# Patient Record
Sex: FEMALE | Race: WHITE | Employment: OTHER | URBAN - METROPOLITAN AREA
[De-identification: names, ages, dates, MRNs, and addresses within clinical notes are randomized per-mention and may not be internally consistent; named-entity substitution may affect disease eponyms.]

---

## 2017-02-20 RX ORDER — ALENDRONATE SODIUM 70 MG/1
TABLET ORAL
Qty: 12 TABLET | Refills: 0 | Status: SHIPPED | OUTPATIENT
Start: 2017-02-20 | End: 2017-06-26 | Stop reason: SDUPTHER

## 2017-06-01 ENCOUNTER — APPOINTMENT (OUTPATIENT)
Dept: GENERAL RADIOLOGY | Age: 67
End: 2017-06-01
Payer: MEDICARE

## 2017-06-01 ENCOUNTER — HOSPITAL ENCOUNTER (EMERGENCY)
Age: 67
Discharge: HOME | End: 2017-06-01
Attending: EMERGENCY MEDICINE
Payer: MEDICARE

## 2017-06-01 VITALS
HEART RATE: 80 BPM | RESPIRATION RATE: 16 BRPM | DIASTOLIC BLOOD PRESSURE: 87 MMHG | OXYGEN SATURATION: 99 % | SYSTOLIC BLOOD PRESSURE: 150 MMHG | WEIGHT: 192 LBS | HEIGHT: 65 IN | BODY MASS INDEX: 31.99 KG/M2 | TEMPERATURE: 98.2 F

## 2017-06-01 DIAGNOSIS — S70.02XA CONTUSION OF LEFT HIP, INITIAL ENCOUNTER: ICD-10-CM
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
## 2017-06-01 DIAGNOSIS — S40.012A CONTUSION OF LEFT SHOULDER, INITIAL ENCOUNTER: Primary | ICD-10-CM

## 2017-06-01 PROCEDURE — 73552 X-RAY EXAM OF FEMUR 2/>: CPT

## 2017-06-01 PROCEDURE — 99284 EMERGENCY DEPT VISIT MOD MDM: CPT

## 2017-06-01 PROCEDURE — 73030 X-RAY EXAM OF SHOULDER: CPT

## 2017-06-01 ASSESSMENT — ENCOUNTER SYMPTOMS
BACK PAIN: 0
WHEEZING: 0
VOMITING: 0
COLOR CHANGE: 0
SHORTNESS OF BREATH: 0
BLOOD IN STOOL: 0
EYE DISCHARGE: 0
FACIAL SWELLING: 0
COUGH: 0
SINUS PRESSURE: 0
DIARRHEA: 0
CONSTIPATION: 0
CHEST TIGHTNESS: 0
TROUBLE SWALLOWING: 0
NAUSEA: 0
EYE PAIN: 0
ABDOMINAL PAIN: 0
SORE THROAT: 0
RHINORRHEA: 0
EYE REDNESS: 0

## 2017-06-01 ASSESSMENT — PAIN SCALES - GENERAL: PAINLEVEL_OUTOF10: 5

## 2017-06-26 RX ORDER — ALENDRONATE SODIUM 70 MG/1
TABLET ORAL
Qty: 12 TABLET | Refills: 0 | Status: SHIPPED | OUTPATIENT
Start: 2017-06-26 | End: 2017-12-18 | Stop reason: SDUPTHER

## 2017-12-18 RX ORDER — ALENDRONATE SODIUM 70 MG/1
TABLET ORAL
Qty: 12 TABLET | Refills: 0 | Status: SHIPPED | OUTPATIENT
Start: 2017-12-18 | End: 2018-03-26 | Stop reason: SDUPTHER

## 2018-04-10 ENCOUNTER — HOSPITAL ENCOUNTER (OUTPATIENT)
Dept: WOMENS IMAGING | Age: 68
Discharge: HOME | End: 2018-04-12
Payer: MEDICARE

## 2018-04-10 DIAGNOSIS — Z78.0 POST-MENOPAUSAL: ICD-10-CM

## 2018-04-10 DIAGNOSIS — Z12.39 BREAST CANCER SCREENING: ICD-10-CM

## 2018-04-10 DIAGNOSIS — M81.0 AGE-RELATED OSTEOPOROSIS WITHOUT CURRENT PATHOLOGICAL FRACTURE: ICD-10-CM

## 2018-04-10 PROCEDURE — 77067 SCR MAMMO BI INCL CAD: CPT

## 2018-04-10 PROCEDURE — 77080 DXA BONE DENSITY AXIAL: CPT

## 2018-04-13 PROBLEM — E78.00 PURE HYPERCHOLESTEROLEMIA: Status: ACTIVE | Noted: 2018-04-13

## 2018-04-25 ENCOUNTER — CARE COORDINATION (OUTPATIENT)
Dept: CARE COORDINATION | Age: 68
End: 2018-04-25

## 2018-05-10 ENCOUNTER — CARE COORDINATION (OUTPATIENT)
Dept: CARE COORDINATION | Age: 68
End: 2018-05-10

## 2018-05-14 ENCOUNTER — CARE COORDINATION (OUTPATIENT)
Dept: CARE COORDINATION | Age: 68
End: 2018-05-14

## 2018-05-15 ENCOUNTER — CARE COORDINATION (OUTPATIENT)
Dept: CARE COORDINATION | Age: 68
End: 2018-05-15

## 2018-05-23 ENCOUNTER — CARE COORDINATION (OUTPATIENT)
Dept: CARE COORDINATION | Age: 68
End: 2018-05-23

## 2018-05-24 ENCOUNTER — CARE COORDINATION (OUTPATIENT)
Dept: CARE COORDINATION | Age: 68
End: 2018-05-24

## 2018-06-06 ENCOUNTER — CARE COORDINATION (OUTPATIENT)
Dept: CARE COORDINATION | Age: 68
End: 2018-06-06

## 2018-06-27 ENCOUNTER — CARE COORDINATION (OUTPATIENT)
Dept: CARE COORDINATION | Age: 68
End: 2018-06-27

## 2018-07-06 ENCOUNTER — CARE COORDINATION (OUTPATIENT)
Dept: CARE COORDINATION | Age: 68
End: 2018-07-06

## 2018-07-10 ENCOUNTER — CARE COORDINATION (OUTPATIENT)
Dept: CARE COORDINATION | Age: 68
End: 2018-07-10

## 2018-07-25 ENCOUNTER — CARE COORDINATION (OUTPATIENT)
Dept: CARE COORDINATION | Age: 68
End: 2018-07-25

## 2018-07-25 NOTE — CARE COORDINATION
Nutrition Care Coordinator Follow-Up visit:    Food Recall: eating 3 meals/day    Activity Level:  Sedentary:  Lightly Active: X  Moderately Active:  Very Active:    Adult BMI:  Underweight (below 18.5)  Normal Weight (18.5-24.9)  Overweight (25-29. 9)  Obese (30-39. 9) X  Morbidly Obese (>40)    Weight Change: down 5 pounds over the past 1-2 months      Plan:  Plan was established with patient:  Increase dietary fiber by consuming whole grains, fruits and vegetables:  Limit dietary cholesterol to >200mg/day: Increase water intake:  Avoid added sugar:  Avoid sweetened beverages:  Choose lean meats:  Limit sodium intake to <2gm/day: X    Monitoring: Will monitor weight: X  Will monitor adherence to meal plan: Will monitor adherence to exercise plan: Will monitor HGA1c:    Handouts Provided :  Low Carb snacking:  Carb counting /individual meal plan:  Portion Control:  Food Labels:  Physical Activity:  Low Fat/Cholesterol:  Hypo/Hyperglycemia:  Calorie Controlled Meal Plan:  Low Sodium: X  Low phosphorus foods: X    Goals: Increase water consumption to 8oz. 6-8 times daily:  Manage blood sugars by consuming 3 meals spaced every 4-5 hours with 2-3 snacks daily:  Increase fiber and decrease fat intake by consuming 1-2 fruit servings and 2-3 vegetable servings per day. Increase physical activity by: Encouraged to increase walking as tolerated  Consume less than 2,000mg of sodium/day-Discussed/reviewed  Avoid consumption of sweetened beverages and added sugar by reading food labels:  Monitor blood sugars by using meter to check blood glucose before morning meal and 2 hours after a meal daily:  Decrease risk of coronary heart disease by consuming fish that contains omega-3 fatty acids at least twice a week, avoiding partially hydrogenated oil/trans fats and limiting saturated fat intake by reading food labels:    Patient goals set:  1. Patient continues to work on reducing her sodium intake, goal is <2,000mg/day. She relays not adding salt and avoiding processed foods. We also reviewed low phosphorus guidelines. 2. Encouraged to continue to work on limiting sugary drinks, down to 1 can of Sprite/week. Encouraged her to increase water intake. Reviewed plate method- encouraged 1/2 of her late to be fruit and non-starchy vegetables at all meals, she relays she continues to improve on this. Patient encouraged by 5 pound weight loss, encouraged to continue to work on. Will follow up in 2-3 weeks. Patient had questions regarding test results referred her back to provider to discuss.         Francisco George

## 2018-08-15 ENCOUNTER — CARE COORDINATION (OUTPATIENT)
Dept: CARE COORDINATION | Age: 68
End: 2018-08-15

## 2018-08-17 ENCOUNTER — CARE COORDINATION (OUTPATIENT)
Dept: CARE COORDINATION | Age: 68
End: 2018-08-17

## 2018-08-17 NOTE — CARE COORDINATION
reduced her sodium intake, goal is <2,000mg/day. She relays not adding salt and avoiding processed foods. We also reviewed low phosphorus guidelines. 2. Patient has cut out sugary drinks, down to <1 can of Sprite/week.  Encouraged her to increase water intake. Reviewed plate method- encouraged 1/2 of her plate to be fruit and non-starchy vegetables at all meals, she relays she continues to improve on this. Patient had no further questions at this time, awaiting thyroid surgery next month. Will f/u after surgery.           Donald Donohue

## 2018-08-17 NOTE — CARE COORDINATION
Ambulatory Care Coordination Note  8/17/2018  CM Risk Score: 1  Whit Mortality Risk Score: 2.78    ACC: Racheal Valderrama    Summary Note: Tita reports she is doing \"good\" today. She is scheduled for a LL parathyroidectomy on 9.4. 18. She states she has not received \"the packet\" in the mail yet from the office with the information she will need to prepare for the surgery. She was told this \"surgery should get her calcium level back to normal\". She was grateful for the assistance provided to her from [address] [place] [address]. Plan:  Follow Tita in the back ground until her surgery is complete. CC will make herself available to assist if necessary. Care Coordination Interventions    Program Enrollment:  Rising Risk  Referral from Primary Care Provider:  No  Suggested Interventions and Community Resources  Registered Dietician:  Completed         Goals Addressed     None          Prior to Admission medications    Medication Sig Start Date End Date Taking? Authorizing Provider   omeprazole (PRILOSEC) 20 MG delayed release capsule Take 20 mg by mouth 2 times daily    Historical Provider, MD   acetaminophen (TYLENOL) 325 MG tablet Take 650 mg by mouth 2 times daily    Historical Provider, MD   DiphenhydrAMINE HCl (BENADRYL ALLERGY PO) Take 1 capsule by mouth as needed    Historical Provider, MD   atorvastatin (LIPITOR) 10 MG tablet Take 1 tablet by mouth daily 4/13/18   TOI Richard CNP   Handicap Placard [facility] It is my medical opinin that Juli Johnson requires a disability parking placard for the following reasons:  She has limited walking ability due to an arthritic condition.   Duration of need: permanent 3/26/18   TOI Richard CNP   Probiotic Product (PROBIOTIC DAILY PO) Take 1 capsule by mouth daily     Historical Provider, MD       Future Appointments  Date Time Provider Monisha Martinez   8/28/2018 3:00 PM [facility] RM 2 [facility]   10/10/2018 11:15 AM [facility] Gomez [facility]   10/15/2018 1:00 PM Kristen Hernandez, 1 [facility]

## 2018-08-28 ENCOUNTER — HOSPITAL ENCOUNTER (OUTPATIENT)
Dept: PREADMISSION TESTING | Age: 68
Discharge: HOME | End: 2018-09-01
Payer: MEDICARE

## 2018-08-28 VITALS
BODY MASS INDEX: 35.16 KG/M2 | SYSTOLIC BLOOD PRESSURE: 149 MMHG | DIASTOLIC BLOOD PRESSURE: 82 MMHG | TEMPERATURE: 98.1 F | HEART RATE: 56 BPM | OXYGEN SATURATION: 97 % | WEIGHT: 211 LBS | HEIGHT: 65 IN | RESPIRATION RATE: 16 BRPM

## 2018-08-28 LAB
ANION GAP SERPL CALCULATED.3IONS-SCNC: 10 MMOL/L (ref 9–17)
BUN BLDV-MCNC: 15 MG/DL (ref 8–23)
CHLORIDE BLD-SCNC: 106 MMOL/L (ref 98–107)
CO2: 24 MMOL/L (ref 20–31)
CREAT SERPL-MCNC: 1.05 MG/DL (ref 0.5–0.9)
EKG ATRIAL RATE: 51 BPM
EKG P AXIS: 6 DEGREES
EKG P-R INTERVAL: 188 MS
EKG Q-T INTERVAL: 442 MS
EKG QRS DURATION: 110 MS
EKG QTC CALCULATION (BAZETT): 407 MS
EKG R AXIS: -32 DEGREES
EKG T AXIS: 10 DEGREES
EKG VENTRICULAR RATE: 51 BPM
GFR AFRICAN AMERICAN: >60 ML/MIN
GFR NON-AFRICAN AMERICAN: 52 ML/MIN
GFR SERPL CREATININE-BSD FRML MDRD: ABNORMAL ML/MIN/{1.73_M2}
GFR SERPL CREATININE-BSD FRML MDRD: ABNORMAL ML/MIN/{1.73_M2}
HCT VFR BLD CALC: 41.4 % (ref 36.3–47.1)
HEMOGLOBIN: 13 G/DL (ref 11.9–15.1)
POTASSIUM SERPL-SCNC: 4.6 MMOL/L (ref 3.7–5.3)
SODIUM BLD-SCNC: 140 MMOL/L (ref 135–144)

## 2018-08-28 PROCEDURE — 84520 ASSAY OF UREA NITROGEN: CPT

## 2018-08-28 PROCEDURE — 36415 COLL VENOUS BLD VENIPUNCTURE: CPT

## 2018-08-28 PROCEDURE — 93005 ELECTROCARDIOGRAM TRACING: CPT

## 2018-08-28 PROCEDURE — 80051 ELECTROLYTE PANEL: CPT

## 2018-08-28 PROCEDURE — 82565 ASSAY OF CREATININE: CPT

## 2018-08-28 PROCEDURE — 85018 HEMOGLOBIN: CPT

## 2018-08-28 PROCEDURE — 85014 HEMATOCRIT: CPT

## 2018-08-28 RX ORDER — ACETAMINOPHEN 500 MG
1000 TABLET ORAL 2 TIMES DAILY PRN
COMMUNITY

## 2018-08-28 RX ORDER — SODIUM CHLORIDE, SODIUM LACTATE, POTASSIUM CHLORIDE, CALCIUM CHLORIDE 600; 310; 30; 20 MG/100ML; MG/100ML; MG/100ML; MG/100ML
1000 INJECTION, SOLUTION INTRAVENOUS CONTINUOUS
Status: CANCELLED | OUTPATIENT
Start: 2018-08-28

## 2018-08-28 ASSESSMENT — PAIN DESCRIPTION - FREQUENCY: FREQUENCY: CONTINUOUS

## 2018-08-28 ASSESSMENT — PAIN DESCRIPTION - ONSET: ONSET: ON-GOING

## 2018-08-28 ASSESSMENT — PAIN DESCRIPTION - PROGRESSION: CLINICAL_PROGRESSION: GRADUALLY WORSENING

## 2018-08-28 ASSESSMENT — PAIN DESCRIPTION - DESCRIPTORS: DESCRIPTORS: ACHING

## 2018-08-28 ASSESSMENT — PAIN SCALES - GENERAL: PAINLEVEL_OUTOF10: 5

## 2018-08-28 ASSESSMENT — PAIN DESCRIPTION - PAIN TYPE: TYPE: CHRONIC PAIN

## 2018-08-28 NOTE — ANESTHESIA PRE-OP
Anesthesia Focused Assessment    STOP-BANG Sleep Apnea Questionnaire    Obstructive Sleep Apnea:                                                                 No     Machine used:                                                                                  No            SNORE loudly (heard through closed doors)? No      TIRED, fatigued, sleepy during daytime? No      OBSERVED stopping breathing during sleep? No      High blood PRESSURE being treated? No      BMI over 35? Yes  AGE over 48? Yes  NECK circumference over 16\"? No  GENDER (male)? No                High risk 5-8  Intermediate risk 3-4  Low risk 0-2    Total 3  High risk 5-8  Intermediate risk 3-4  Low risk 0-2      Type 1 DM:                                                                                        No    TYPE 2:                                                                                             No    If yes, insulin dependent? No    Coronary Artery Disease :                                                                No        Active smoker                                                                                   No    Drinks Alcohol                                                                                   No    Dentition: Dentures                                                                            No              Partial                                                                                                 No     Any loose or missing teeth                                                                 Yes, missing     Defib / AICD / Pacemaker:                                                               No    Renal Failure: No    If Yes, On dialysis?       Hx of anesthesia complications with Patient                            Crying post op     Hx of anesthesia complications with family No    Medical or cardiac clearance ordered:                                         No    Anesthesiologist called:                                                                No    DANIEL P Christiana Dandy PA-C  Electronically signed 8/28/2018 at 10:31 AM

## 2018-08-28 NOTE — H&P
History and Physical    Pt Name: Giovanni Lopez  MRN: [id number]  YOB: 1950  Date of evaluation: 8/28/2018  Primary Care Physician: Terence Gannon MD  Patient evaluated at the request of  Dr. Alexis Hays    Reason for evaluation:   parathyroid adenoma   SUBJECTIVE:   History of Chief Complaint:      Luis Felipe García is a 76 y.o. female  Who was dx with   parathyroid adenoma  On a incidental finding of routine blood work in 2018 , denies kidney stones . Past Medical History      has a past medical history of CKD (chronic kidney disease) stage 3, GFR 30-59 ml/min; CKD (chronic kidney disease) stage 3, GFR 30-59 ml/min; Fibromyalgia; GERD (gastroesophageal reflux disease); Hiatal hernia; Hypercholesterolemia; OA (osteoarthritis); and Osteoarthritis. Past Surgical History   has a past surgical history that includes Tonsillectomy; Hysterectomy; Cholecystectomy; Appendectomy; Carpal tunnel release; cyst removal; Colonoscopy; Upper gastrointestinal endoscopy; and Upper gastrointestinal endoscopy. Medications   Scheduled Meds:  Current Outpatient Rx   Medication Sig Dispense Refill    acetaminophen (TYLENOL) 500 MG tablet Take 1,000 mg by mouth 2 times daily      omeprazole (PRILOSEC) 20 MG delayed release capsule Take 20 mg by mouth 2 times daily      DiphenhydrAMINE HCl (BENADRYL ALLERGY PO) Take 1 capsule by mouth as needed      atorvastatin (LIPITOR) 10 MG tablet Take 1 tablet by mouth daily (Patient taking differently: Take 10 mg by mouth nightly ) 90 tablet 1    Probiotic Product (PROBIOTIC DAILY PO) Take 1 capsule by mouth daily       Handicap Placard [facility]ISC It is my medical opinin that Mundo Puentes requires a disability parking placard for the following reasons:  She has limited walking ability due to an arthritic condition. Duration of need: permanent 1 each 0     Continuous Infusions:  PRN Meds:.   Allergies  is allergic to seasonal.    Family History    family history

## 2018-09-03 ENCOUNTER — ANESTHESIA EVENT (OUTPATIENT)
Dept: OPERATING ROOM | Age: 68
End: 2018-09-03
Payer: MEDICARE

## 2018-09-04 ENCOUNTER — ANESTHESIA (OUTPATIENT)
Dept: OPERATING ROOM | Age: 68
End: 2018-09-04
Payer: MEDICARE

## 2018-09-04 ENCOUNTER — HOSPITAL ENCOUNTER (OUTPATIENT)
Age: 68
Setting detail: OUTPATIENT SURGERY
Discharge: HOME | End: 2018-09-04
Attending: OTOLARYNGOLOGY | Admitting: OTOLARYNGOLOGY
Payer: MEDICARE

## 2018-09-04 VITALS
WEIGHT: 207 LBS | RESPIRATION RATE: 16 BRPM | SYSTOLIC BLOOD PRESSURE: 126 MMHG | HEART RATE: 78 BPM | DIASTOLIC BLOOD PRESSURE: 67 MMHG | BODY MASS INDEX: 34.49 KG/M2 | OXYGEN SATURATION: 96 % | TEMPERATURE: 96.8 F | HEIGHT: 65 IN

## 2018-09-04 VITALS — TEMPERATURE: 94.5 F | OXYGEN SATURATION: 98 % | DIASTOLIC BLOOD PRESSURE: 85 MMHG | SYSTOLIC BLOOD PRESSURE: 104 MMHG

## 2018-09-04 DIAGNOSIS — D35.1 PARATHYROID ADENOMA: Primary | ICD-10-CM

## 2018-09-04 LAB — PTH INTACT: 29.76 PG/ML (ref 15–65)

## 2018-09-04 PROCEDURE — 6370000000 HC RX 637 (ALT 250 FOR IP): Performed by: ANESTHESIOLOGY

## 2018-09-04 PROCEDURE — 6360000002 HC RX W HCPCS: Performed by: ANESTHESIOLOGY

## 2018-09-04 PROCEDURE — 2720000010 HC SURG SUPPLY STERILE: Performed by: OTOLARYNGOLOGY

## 2018-09-04 PROCEDURE — 3700000001 HC ADD 15 MINUTES (ANESTHESIA): Performed by: OTOLARYNGOLOGY

## 2018-09-04 PROCEDURE — 7100000041 HC SPAR PHASE II RECOVERY - ADDTL 15 MIN: Performed by: OTOLARYNGOLOGY

## 2018-09-04 PROCEDURE — 2500000003 HC RX 250 WO HCPCS: Performed by: OTOLARYNGOLOGY

## 2018-09-04 PROCEDURE — 88331 PATH CONSLTJ SURG 1 BLK 1SPC: CPT

## 2018-09-04 PROCEDURE — 2580000003 HC RX 258: Performed by: ANESTHESIOLOGY

## 2018-09-04 PROCEDURE — 2580000003 HC RX 258: Performed by: OTOLARYNGOLOGY

## 2018-09-04 PROCEDURE — 88305 TISSUE EXAM BY PATHOLOGIST: CPT

## 2018-09-04 PROCEDURE — 7100000001 HC PACU RECOVERY - ADDTL 15 MIN: Performed by: OTOLARYNGOLOGY

## 2018-09-04 PROCEDURE — 2500000003 HC RX 250 WO HCPCS: Performed by: NURSE ANESTHETIST, CERTIFIED REGISTERED

## 2018-09-04 PROCEDURE — 2709999900 HC NON-CHARGEABLE SUPPLY: Performed by: OTOLARYNGOLOGY

## 2018-09-04 PROCEDURE — 2780000010 HC IMPLANT OTHER: Performed by: OTOLARYNGOLOGY

## 2018-09-04 PROCEDURE — 3600000004 HC SURGERY LEVEL 4 BASE: Performed by: OTOLARYNGOLOGY

## 2018-09-04 PROCEDURE — 83970 ASSAY OF PARATHORMONE: CPT

## 2018-09-04 PROCEDURE — 3600000014 HC SURGERY LEVEL 4 ADDTL 15MIN: Performed by: OTOLARYNGOLOGY

## 2018-09-04 PROCEDURE — 6360000002 HC RX W HCPCS: Performed by: NURSE ANESTHETIST, CERTIFIED REGISTERED

## 2018-09-04 PROCEDURE — 7100000000 HC PACU RECOVERY - FIRST 15 MIN: Performed by: OTOLARYNGOLOGY

## 2018-09-04 PROCEDURE — 3700000000 HC ANESTHESIA ATTENDED CARE: Performed by: OTOLARYNGOLOGY

## 2018-09-04 PROCEDURE — 7100000040 HC SPAR PHASE II RECOVERY - FIRST 15 MIN: Performed by: OTOLARYNGOLOGY

## 2018-09-04 DEVICE — AGENT HEMOSTATIC SURGIFLOW MATRIX KIT W/THROMBIN: Type: IMPLANTABLE DEVICE | Status: FUNCTIONAL

## 2018-09-04 RX ORDER — DEXAMETHASONE SODIUM PHOSPHATE 4 MG/ML
INJECTION, SOLUTION INTRA-ARTICULAR; INTRALESIONAL; INTRAMUSCULAR; INTRAVENOUS; SOFT TISSUE PRN
Status: DISCONTINUED | OUTPATIENT
Start: 2018-09-04 | End: 2018-09-04 | Stop reason: SDUPTHER

## 2018-09-04 RX ORDER — SUCCINYLCHOLINE CHLORIDE 20 MG/ML
INJECTION INTRAMUSCULAR; INTRAVENOUS PRN
Status: DISCONTINUED | OUTPATIENT
Start: 2018-09-04 | End: 2018-09-04 | Stop reason: SDUPTHER

## 2018-09-04 RX ORDER — PROPOFOL 10 MG/ML
INJECTION, EMULSION INTRAVENOUS PRN
Status: DISCONTINUED | OUTPATIENT
Start: 2018-09-04 | End: 2018-09-04 | Stop reason: SDUPTHER

## 2018-09-04 RX ORDER — SCOLOPAMINE TRANSDERMAL SYSTEM 1 MG/1
1 PATCH, EXTENDED RELEASE TRANSDERMAL ONCE
Status: DISCONTINUED | OUTPATIENT
Start: 2018-09-04 | End: 2018-09-04 | Stop reason: HOSPADM

## 2018-09-04 RX ORDER — METHYLPREDNISOLONE 4 MG/1
TABLET ORAL
Qty: 1 KIT | Refills: 0 | Status: SHIPPED | OUTPATIENT
Start: 2018-09-05 | End: 2018-09-19 | Stop reason: ALTCHOICE

## 2018-09-04 RX ORDER — MAGNESIUM HYDROXIDE 1200 MG/15ML
LIQUID ORAL CONTINUOUS PRN
Status: DISCONTINUED | OUTPATIENT
Start: 2018-09-04 | End: 2018-09-04 | Stop reason: HOSPADM

## 2018-09-04 RX ORDER — LIDOCAINE HYDROCHLORIDE 10 MG/ML
INJECTION, SOLUTION EPIDURAL; INFILTRATION; INTRACAUDAL; PERINEURAL PRN
Status: DISCONTINUED | OUTPATIENT
Start: 2018-09-04 | End: 2018-09-04 | Stop reason: SDUPTHER

## 2018-09-04 RX ORDER — MIDAZOLAM HYDROCHLORIDE 1 MG/ML
INJECTION INTRAMUSCULAR; INTRAVENOUS PRN
Status: DISCONTINUED | OUTPATIENT
Start: 2018-09-04 | End: 2018-09-04

## 2018-09-04 RX ORDER — FENTANYL CITRATE 50 UG/ML
25 INJECTION, SOLUTION INTRAMUSCULAR; INTRAVENOUS EVERY 5 MIN PRN
Status: DISCONTINUED | OUTPATIENT
Start: 2018-09-04 | End: 2018-09-04 | Stop reason: HOSPADM

## 2018-09-04 RX ORDER — LIDOCAINE HYDROCHLORIDE AND EPINEPHRINE 10; 10 MG/ML; UG/ML
INJECTION, SOLUTION INFILTRATION; PERINEURAL PRN
Status: DISCONTINUED | OUTPATIENT
Start: 2018-09-04 | End: 2018-09-04 | Stop reason: HOSPADM

## 2018-09-04 RX ORDER — CEFAZOLIN SODIUM 1 G/50ML
INJECTION, SOLUTION INTRAVENOUS PRN
Status: DISCONTINUED | OUTPATIENT
Start: 2018-09-04 | End: 2018-09-04 | Stop reason: SDUPTHER

## 2018-09-04 RX ORDER — PROMETHAZINE HYDROCHLORIDE 25 MG/ML
6.25 INJECTION, SOLUTION INTRAMUSCULAR; INTRAVENOUS
Status: COMPLETED | OUTPATIENT
Start: 2018-09-04 | End: 2018-09-04

## 2018-09-04 RX ORDER — EPHEDRINE SULFATE 50 MG/ML
INJECTION, SOLUTION INTRAVENOUS PRN
Status: DISCONTINUED | OUTPATIENT
Start: 2018-09-04 | End: 2018-09-04 | Stop reason: SDUPTHER

## 2018-09-04 RX ORDER — GLYCOPYRROLATE 1 MG/5 ML
SYRINGE (ML) INTRAVENOUS PRN
Status: DISCONTINUED | OUTPATIENT
Start: 2018-09-04 | End: 2018-09-04 | Stop reason: SDUPTHER

## 2018-09-04 RX ORDER — FENTANYL CITRATE 50 UG/ML
INJECTION, SOLUTION INTRAMUSCULAR; INTRAVENOUS PRN
Status: DISCONTINUED | OUTPATIENT
Start: 2018-09-04 | End: 2018-09-04 | Stop reason: SDUPTHER

## 2018-09-04 RX ORDER — HYDROCODONE BITARTRATE AND ACETAMINOPHEN 5; 325 MG/1; MG/1
1 TABLET ORAL EVERY 4 HOURS PRN
Qty: 20 TABLET | Refills: 0 | Status: SHIPPED | OUTPATIENT
Start: 2018-09-04 | End: 2018-09-11

## 2018-09-04 RX ORDER — ONDANSETRON 2 MG/ML
INJECTION INTRAMUSCULAR; INTRAVENOUS PRN
Status: DISCONTINUED | OUTPATIENT
Start: 2018-09-04 | End: 2018-09-04 | Stop reason: SDUPTHER

## 2018-09-04 RX ORDER — MORPHINE SULFATE 4 MG/ML
INJECTION, SOLUTION INTRAMUSCULAR; INTRAVENOUS PRN
Status: DISCONTINUED | OUTPATIENT
Start: 2018-09-04 | End: 2018-09-04 | Stop reason: SDUPTHER

## 2018-09-04 RX ORDER — FENTANYL CITRATE 50 UG/ML
50 INJECTION, SOLUTION INTRAMUSCULAR; INTRAVENOUS EVERY 5 MIN PRN
Status: DISCONTINUED | OUTPATIENT
Start: 2018-09-04 | End: 2018-09-04 | Stop reason: HOSPADM

## 2018-09-04 RX ORDER — SODIUM CHLORIDE, SODIUM LACTATE, POTASSIUM CHLORIDE, CALCIUM CHLORIDE 600; 310; 30; 20 MG/100ML; MG/100ML; MG/100ML; MG/100ML
1000 INJECTION, SOLUTION INTRAVENOUS CONTINUOUS
Status: DISCONTINUED | OUTPATIENT
Start: 2018-09-04 | End: 2018-09-04 | Stop reason: HOSPADM

## 2018-09-04 RX ADMIN — PROPOFOL 200 MG: 10 INJECTION, EMULSION INTRAVENOUS at 09:32

## 2018-09-04 RX ADMIN — Medication 0.2 MG: at 10:22

## 2018-09-04 RX ADMIN — MORPHINE SULFATE 1 MG: 4 INJECTION INTRAVENOUS at 10:53

## 2018-09-04 RX ADMIN — PHENYLEPHRINE HYDROCHLORIDE 100 MCG: 10 INJECTION INTRAVENOUS at 09:52

## 2018-09-04 RX ADMIN — MORPHINE SULFATE 4 MG: 4 INJECTION INTRAVENOUS at 09:44

## 2018-09-04 RX ADMIN — Medication 0.2 MG: at 09:50

## 2018-09-04 RX ADMIN — ONDANSETRON 4 MG: 2 INJECTION, SOLUTION INTRAMUSCULAR; INTRAVENOUS at 10:17

## 2018-09-04 RX ADMIN — EPHEDRINE SULFATE 10 MG: 50 INJECTION, SOLUTION INTRAMUSCULAR; INTRAVENOUS; SUBCUTANEOUS at 09:52

## 2018-09-04 RX ADMIN — LIDOCAINE HYDROCHLORIDE 50 MG: 10 INJECTION, SOLUTION EPIDURAL; INFILTRATION; INTRACAUDAL; PERINEURAL at 09:32

## 2018-09-04 RX ADMIN — PHENYLEPHRINE HYDROCHLORIDE 200 MCG: 10 INJECTION INTRAVENOUS at 10:29

## 2018-09-04 RX ADMIN — PROMETHAZINE HYDROCHLORIDE 6.25 MG: 25 INJECTION, SOLUTION INTRAMUSCULAR; INTRAVENOUS at 11:37

## 2018-09-04 RX ADMIN — Medication 140 MG: at 09:32

## 2018-09-04 RX ADMIN — EPHEDRINE SULFATE 5 MG: 50 INJECTION, SOLUTION INTRAMUSCULAR; INTRAVENOUS; SUBCUTANEOUS at 10:22

## 2018-09-04 RX ADMIN — MORPHINE SULFATE 1 MG: 4 INJECTION INTRAVENOUS at 10:54

## 2018-09-04 RX ADMIN — DEXAMETHASONE SODIUM PHOSPHATE 10 MG: 4 INJECTION, SOLUTION INTRAMUSCULAR; INTRAVENOUS at 09:37

## 2018-09-04 RX ADMIN — PHENYLEPHRINE HYDROCHLORIDE 100 MCG: 10 INJECTION INTRAVENOUS at 10:06

## 2018-09-04 RX ADMIN — SODIUM CHLORIDE, POTASSIUM CHLORIDE, SODIUM LACTATE AND CALCIUM CHLORIDE 1000 ML: 600; 310; 30; 20 INJECTION, SOLUTION INTRAVENOUS at 07:39

## 2018-09-04 RX ADMIN — PHENYLEPHRINE HYDROCHLORIDE 100 MCG: 10 INJECTION INTRAVENOUS at 10:08

## 2018-09-04 RX ADMIN — CEFAZOLIN SODIUM 2 G: 1 INJECTION, SOLUTION INTRAVENOUS at 09:35

## 2018-09-04 RX ADMIN — MORPHINE SULFATE 1 MG: 4 INJECTION INTRAVENOUS at 10:48

## 2018-09-04 RX ADMIN — FENTANYL CITRATE 100 MCG: 50 INJECTION INTRAMUSCULAR; INTRAVENOUS at 09:32

## 2018-09-04 RX ADMIN — EPHEDRINE SULFATE 10 MG: 50 INJECTION, SOLUTION INTRAMUSCULAR; INTRAVENOUS; SUBCUTANEOUS at 09:51

## 2018-09-04 RX ADMIN — SODIUM CHLORIDE, POTASSIUM CHLORIDE, SODIUM LACTATE AND CALCIUM CHLORIDE: 600; 310; 30; 20 INJECTION, SOLUTION INTRAVENOUS at 10:15

## 2018-09-04 ASSESSMENT — PULMONARY FUNCTION TESTS
PIF_VALUE: 24
PIF_VALUE: 22
PIF_VALUE: 22
PIF_VALUE: 19
PIF_VALUE: 0
PIF_VALUE: 19
PIF_VALUE: 24
PIF_VALUE: 23
PIF_VALUE: 20
PIF_VALUE: 20
PIF_VALUE: 4
PIF_VALUE: 22
PIF_VALUE: 23
PIF_VALUE: 3
PIF_VALUE: 25
PIF_VALUE: 22
PIF_VALUE: 21
PIF_VALUE: 3
PIF_VALUE: 21
PIF_VALUE: 15
PIF_VALUE: 1
PIF_VALUE: 21
PIF_VALUE: 21
PIF_VALUE: 20
PIF_VALUE: 21
PIF_VALUE: 22
PIF_VALUE: 24
PIF_VALUE: 0
PIF_VALUE: 27
PIF_VALUE: 20
PIF_VALUE: 21
PIF_VALUE: 1
PIF_VALUE: 0
PIF_VALUE: 22
PIF_VALUE: 23
PIF_VALUE: 2
PIF_VALUE: 2
PIF_VALUE: 1
PIF_VALUE: 23
PIF_VALUE: 22
PIF_VALUE: 21
PIF_VALUE: 22
PIF_VALUE: 0
PIF_VALUE: 18
PIF_VALUE: 0
PIF_VALUE: 21
PIF_VALUE: 1
PIF_VALUE: 19
PIF_VALUE: 23
PIF_VALUE: 23
PIF_VALUE: 24
PIF_VALUE: 23
PIF_VALUE: 20
PIF_VALUE: 18
PIF_VALUE: 22
PIF_VALUE: 25
PIF_VALUE: 11
PIF_VALUE: 21
PIF_VALUE: 19
PIF_VALUE: 23
PIF_VALUE: 7
PIF_VALUE: 24
PIF_VALUE: 20
PIF_VALUE: 1
PIF_VALUE: 21
PIF_VALUE: 20
PIF_VALUE: 26
PIF_VALUE: 25
PIF_VALUE: 1
PIF_VALUE: 24
PIF_VALUE: 22
PIF_VALUE: 22
PIF_VALUE: 7
PIF_VALUE: 22
PIF_VALUE: 19
PIF_VALUE: 24
PIF_VALUE: 23
PIF_VALUE: 22
PIF_VALUE: 21
PIF_VALUE: 22
PIF_VALUE: 28
PIF_VALUE: 22
PIF_VALUE: 21
PIF_VALUE: 23
PIF_VALUE: 19
PIF_VALUE: 2
PIF_VALUE: 20
PIF_VALUE: 19
PIF_VALUE: 21
PIF_VALUE: 23
PIF_VALUE: 19
PIF_VALUE: 24
PIF_VALUE: 23

## 2018-09-04 ASSESSMENT — PAIN SCALES - GENERAL
PAINLEVEL_OUTOF10: 0

## 2018-09-04 ASSESSMENT — PAIN - FUNCTIONAL ASSESSMENT: PAIN_FUNCTIONAL_ASSESSMENT: 0-10

## 2018-09-04 ASSESSMENT — PAIN DESCRIPTION - PAIN TYPE: TYPE: SURGICAL PAIN

## 2018-09-04 NOTE — H&P (VIEW-ONLY)
(gastroesophageal reflux disease)    Health care maintenance    OA (osteoarthritis)    Fibromyalgia    Pure hypercholesterolemia    Osteoarthritis    Hypercholesterolemia    Hiatal hernia    CKD (chronic kidney disease) stage 3, GFR 30-59 ml/min               IMPRESSIONS:   1.    parathyroid adenoma   2.  does not have any pertinent problems on file.     Aashish [facility]  Electronically signed 8/28/2018 at 10:31 AM       Scheduled for:  Left lower parathyroidectomy , NIM monitor

## 2018-09-04 NOTE — ANESTHESIA PRE PROCEDURE
Encounters:   09/04/18 (!) 155/72   08/28/18 (!) 149/82   07/05/18 122/64       NPO Status: Time of last liquid consumption: 2200                        Time of last solid consumption: 1700                        Date of last liquid consumption: 09/03/18                        Date of last solid food consumption: 09/03/18    BMI:   Wt Readings from Last 3 Encounters:   09/04/18 207 lb (93.9 kg)   08/28/18 211 lb (95.7 kg)   07/05/18 214 lb (97.1 kg)     Body mass index is 34.45 kg/m². CBC:   Lab Results   Component Value Date    WBC 5.1 06/27/2018    RBC 4.56 06/27/2018    HGB 13.0 08/28/2018    HCT 41.4 08/28/2018    MCV 90 05/16/2018    RDW 14.0 05/16/2018     06/27/2018       CMP:   Lab Results   Component Value Date     08/28/2018    K 4.6 08/28/2018     08/28/2018    CO2 24 08/28/2018    BUN 15 08/28/2018    CREATININE 1.05 08/28/2018    GFRAA >60 08/28/2018    LABGLOM 52 08/28/2018    GLUCOSE 106 04/06/2018    PROT 7.1 05/04/2015    CALCIUM 11.8 06/27/2018    BILITOT 0.6 04/06/2018    ALKPHOS 62 04/06/2018    AST 13 04/06/2018    ALT 12 04/06/2018       POC Tests: No results for input(s): POCGLU, POCNA, POCK, POCCL, POCBUN, POCHEMO, POCHCT in the last 72 hours. Coags: No results found for: PROTIME, INR, APTT    HCG (If Applicable): No results found for: PREGTESTUR, PREGSERUM, HCG, HCGQUANT     ABGs: No results found for: PHART, PO2ART, UWC7DQX, DND9GIT, BEART, I8CCWOJE     Type & Screen (If Applicable):  No results found for: LABABO, [address]    Anesthesia Evaluation  Patient summary reviewed no history of anesthetic complications:   Airway: Mallampati: II  TM distance: >3 FB   Neck ROM: full  Mouth opening: > = 3 FB Dental: normal exam         Pulmonary:             Patient did not smoke on day of surgery.                  Cardiovascular:Negative CV ROS    (+) hyperlipidemia      ECG reviewed               Beta Blocker:  Not on Beta Blocker         Neuro/Psych:   (+) neuromuscular disease:,             GI/Hepatic/Renal:   (+) hiatal hernia, GERD: no interval change,           Endo/Other: Negative Endo/Other ROS             Pt had PAT visit. Abdominal:           Vascular: negative vascular ROS. Anesthesia Plan      general     ASA 2       Induction: intravenous. MIPS: Postoperative opioids intended and Prophylactic antiemetics administered. Anesthetic plan and risks discussed with patient and spouse. Plan discussed with CRNA.                   Nevaeh Neves MD   9/4/2018

## 2018-09-05 ENCOUNTER — CARE COORDINATION (OUTPATIENT)
Dept: CARE COORDINATION | Age: 68
End: 2018-09-05

## 2018-09-05 LAB — SURGICAL PATHOLOGY REPORT: NORMAL

## 2018-09-05 NOTE — OP NOTE
[address]                   [address]                                 OPERATIVE REPORT    PATIENT NAME: Destiny Castrejon                    :        1950  MED REC NO:   [id number]                             ROOM:  ACCOUNT NO:   [de-identified]                           ADMIT DATE: 2018  PROVIDER:     Bryan Fierro    DATE OF PROCEDURE:  2018    PREOPERATIVE DIAGNOSES:  1. Parathyroid adenoma, left inferior. 2.  Primary hyperparathyroidism. POSTOPERATIVE DIAGNOSES:  1. Parathyroid adenoma, left inferior. 2.  Primary hyperparathyroidism. PROCEDURES:  1. Minimally invasive left inferior parathyroidectomy with frozen section. 2.  Continuous monitoring of the recurrent laryngeal nerve using NIM. ANESTHESIA:  General.    SURGEON:  Bryan Fierro MD    BLOOD LOSS:  Less than 10 mL. COMPLICATIONS:  None. INDICATIONS:  This 80-year-old lady has been diagnosed to have primary  hyperparathyroidism with a high calcium level and PTH levels. She has  stage III renal disease, which has been attributed to her high calcium. Workup with sestamibi scan showed adenoma in the left inferior region and  is noted to be about 3 cm. The patient now comes in for the above  procedure. FINDINGS:  A 3-cm mass, presumably left inferior parathyroid, was removed  and sent for frozen section and this confirmed that it was the parathyroid  gland. The recurrent laryngeal nerve was continuously monitored during the  procedure and preserved. OPERATIVE PROCEDURE:  General anesthesia was administered through  orotracheal intubation using the endotracheal tube for monitoring the vocal  cord function. Grounding electrodes were placed, and all this was  connected to the nerve integrity monitor. About 3 mL of 1% Xylocaine with  epinephrine were infiltrated at the site of the proposed incision into the  skin.   The neck was then prepped 16:03:23     [email or web address]  Job#: [id number]     Doc#: [id number]    CC:

## 2018-09-12 ENCOUNTER — CARE COORDINATION (OUTPATIENT)
Dept: CARE COORDINATION | Age: 68
End: 2018-09-12

## 2018-09-18 ENCOUNTER — CARE COORDINATION (OUTPATIENT)
Dept: CARE COORDINATION | Age: 68
End: 2018-09-18

## 2018-09-19 PROBLEM — E66.9 OBESITY, CLASS II, BMI 35-39.9: Status: ACTIVE | Noted: 2018-09-19

## 2018-09-19 PROBLEM — E66.812 OBESITY, CLASS II, BMI 35-39.9: Status: ACTIVE | Noted: 2018-09-19

## 2018-10-10 ENCOUNTER — HOSPITAL ENCOUNTER (OUTPATIENT)
Age: 68
Setting detail: SPECIMEN
Discharge: HOME | End: 2018-10-10
Payer: MEDICARE

## 2018-10-10 DIAGNOSIS — R82.998 LEUKOCYTES IN URINE: ICD-10-CM

## 2018-10-10 LAB
-: ABNORMAL
AMORPHOUS: ABNORMAL
BACTERIA: ABNORMAL
BILIRUBIN URINE: NEGATIVE
CASTS UA: ABNORMAL /LPF
COLOR: YELLOW
COMMENT UA: ABNORMAL
CRYSTALS, UA: ABNORMAL /HPF
EPITHELIAL CELLS UA: ABNORMAL /HPF
GLUCOSE URINE: NEGATIVE
KETONES, URINE: NEGATIVE
LEUKOCYTE ESTERASE, URINE: ABNORMAL
MUCUS: ABNORMAL
NITRITE, URINE: NEGATIVE
OTHER OBSERVATIONS UA: ABNORMAL
PH UA: 6 (ref 5–8)
PROTEIN UA: NEGATIVE
RBC UA: ABNORMAL /HPF
RENAL EPITHELIAL, UA: ABNORMAL /HPF
SPECIFIC GRAVITY UA: 1.02 (ref 1–1.03)
TRICHOMONAS: ABNORMAL
TURBIDITY: ABNORMAL
URINE HGB: NEGATIVE
UROBILINOGEN, URINE: NORMAL
WBC UA: ABNORMAL /HPF
YEAST: ABNORMAL

## 2018-10-10 PROCEDURE — 81001 URINALYSIS AUTO W/SCOPE: CPT

## 2018-10-10 PROCEDURE — 87086 URINE CULTURE/COLONY COUNT: CPT

## 2018-10-12 LAB
CULTURE: NORMAL
Lab: NORMAL
SPECIMEN DESCRIPTION: NORMAL
STATUS: NORMAL

## 2019-10-02 ENCOUNTER — HOSPITAL ENCOUNTER (OUTPATIENT)
Dept: WOMENS IMAGING | Age: 69
Discharge: HOME | End: 2019-10-04
Payer: MEDICARE

## 2019-10-02 DIAGNOSIS — Z12.39 BREAST CANCER SCREENING: ICD-10-CM

## 2019-10-02 PROCEDURE — 77063 BREAST TOMOSYNTHESIS BI: CPT

## 2019-10-28 ENCOUNTER — OFFICE VISIT (OUTPATIENT)
Dept: OBGYN CLINIC | Age: 69
End: 2019-10-28
Payer: MEDICARE

## 2019-10-28 VITALS
HEIGHT: 65 IN | HEART RATE: 85 BPM | WEIGHT: 228 LBS | SYSTOLIC BLOOD PRESSURE: 138 MMHG | DIASTOLIC BLOOD PRESSURE: 89 MMHG | RESPIRATION RATE: 18 BRPM | BODY MASS INDEX: 37.99 KG/M2

## 2019-10-28 DIAGNOSIS — Z01.419 WELL WOMAN EXAM WITH ROUTINE GYNECOLOGICAL EXAM: Primary | ICD-10-CM

## 2019-10-28 DIAGNOSIS — N81.10 FEMALE CYSTOCELE: ICD-10-CM

## 2019-10-28 DIAGNOSIS — R33.9 INCOMPLETE EMPTYING OF BLADDER: ICD-10-CM

## 2019-10-28 PROCEDURE — 99397 PER PM REEVAL EST PAT 65+ YR: CPT | Performed by: CLINICAL NURSE SPECIALIST

## 2019-11-19 DIAGNOSIS — M25.562 ACUTE PAIN OF LEFT KNEE: Primary | ICD-10-CM

## 2019-11-20 ENCOUNTER — OFFICE VISIT (OUTPATIENT)
Dept: ORTHOPEDIC SURGERY | Age: 69
End: 2019-11-20
Payer: MEDICARE

## 2019-11-20 VITALS — BODY MASS INDEX: 33.32 KG/M2 | WEIGHT: 200 LBS | HEIGHT: 65 IN

## 2019-11-20 DIAGNOSIS — M25.562 ACUTE PAIN OF LEFT KNEE: Primary | ICD-10-CM

## 2019-11-20 DIAGNOSIS — M25.561 ACUTE PAIN OF RIGHT KNEE: ICD-10-CM

## 2019-11-20 PROCEDURE — 99203 OFFICE O/P NEW LOW 30 MIN: CPT | Performed by: ORTHOPAEDIC SURGERY

## 2019-11-21 PROBLEM — K85.90 PANCREATITIS: Status: ACTIVE | Noted: 2018-09-12

## 2019-11-26 ENCOUNTER — TELEPHONE (OUTPATIENT)
Dept: ORTHOPEDIC SURGERY | Age: 69
End: 2019-11-26

## 2020-01-07 ENCOUNTER — OFFICE VISIT (OUTPATIENT)
Dept: OBGYN CLINIC | Age: 70
End: 2020-01-07
Payer: MEDICARE

## 2020-01-07 ENCOUNTER — HOSPITAL ENCOUNTER (OUTPATIENT)
Age: 70
Setting detail: SPECIMEN
Discharge: HOME | End: 2020-01-07
Payer: MEDICARE

## 2020-01-07 VITALS
WEIGHT: 223 LBS | BODY MASS INDEX: 37.15 KG/M2 | HEIGHT: 65 IN | SYSTOLIC BLOOD PRESSURE: 132 MMHG | DIASTOLIC BLOOD PRESSURE: 80 MMHG | HEART RATE: 77 BPM

## 2020-01-07 PROBLEM — N39.490 OVERFLOW INCONTINENCE: Status: ACTIVE | Noted: 2020-01-07

## 2020-01-07 PROBLEM — N81.10 BADEN-WALKER GRADE 2 CYSTOCELE: Chronic | Status: ACTIVE | Noted: 2020-01-07

## 2020-01-07 PROBLEM — N81.10 BADEN-WALKER GRADE 2 CYSTOCELE: Status: ACTIVE | Noted: 2020-01-07

## 2020-01-07 LAB
-: ABNORMAL
AMORPHOUS: ABNORMAL
BACTERIA: ABNORMAL
BILIRUBIN URINE: NEGATIVE
CASTS UA: ABNORMAL /LPF
COLOR: YELLOW
COMMENT UA: ABNORMAL
CRYSTALS, UA: ABNORMAL /HPF
DIRECT EXAM: ABNORMAL
EPITHELIAL CELLS UA: ABNORMAL /HPF
GLUCOSE URINE: NEGATIVE
KETONES, URINE: NEGATIVE
LEUKOCYTE ESTERASE, URINE: ABNORMAL
Lab: ABNORMAL
MUCUS: ABNORMAL
NITRITE, URINE: NEGATIVE
OTHER OBSERVATIONS UA: ABNORMAL
PH UA: 6 (ref 5–8)
PROTEIN UA: ABNORMAL
RBC UA: ABNORMAL /HPF
RENAL EPITHELIAL, UA: ABNORMAL /HPF
SPECIFIC GRAVITY UA: 1.02 (ref 1–1.03)
SPECIMEN DESCRIPTION: ABNORMAL
TRICHOMONAS: ABNORMAL
TURBIDITY: ABNORMAL
URINE HGB: NEGATIVE
UROBILINOGEN, URINE: NORMAL
WBC UA: ABNORMAL /HPF
YEAST: ABNORMAL

## 2020-01-07 PROCEDURE — 87510 GARDNER VAG DNA DIR PROBE: CPT

## 2020-01-07 PROCEDURE — 87660 TRICHOMONAS VAGIN DIR PROBE: CPT

## 2020-01-07 PROCEDURE — 81001 URINALYSIS AUTO W/SCOPE: CPT

## 2020-01-07 PROCEDURE — 87491 CHLMYD TRACH DNA AMP PROBE: CPT

## 2020-01-07 PROCEDURE — 87591 N.GONORRHOEAE DNA AMP PROB: CPT

## 2020-01-07 PROCEDURE — 87086 URINE CULTURE/COLONY COUNT: CPT

## 2020-01-07 PROCEDURE — 87480 CANDIDA DNA DIR PROBE: CPT

## 2020-01-07 PROCEDURE — 99214 OFFICE O/P EST MOD 30 MIN: CPT | Performed by: OBSTETRICS & GYNECOLOGY

## 2020-01-07 NOTE — PROGRESS NOTES
Tita Lomas  1/7/2020      Tita Fleming is a 71 y.o. female No obstetric history on file. The patient was seen today. She was here to follow-up regarding her labs and diagnostics ordered at her last visit for the diagnosis of:    ICD-10-CM    1. Dysuria R30.0 Urinalysis Reflex to Culture   2. Acute vaginitis N76.0 VAGINITIS DNA PROBE     C.trachomatis N.gonorrhoeae DNA   3. Garfield-Walker grade 2 cystocele N81.10 conjugated estrogens (PREMARIN) 0.625 MG/GM vaginal cream   4. Overflow incontinence N39.490 Urinalysis Reflex to Culture     conjugated estrogens (PREMARIN) 0.625 MG/GM vaginal cream   5. Atrophy of vagina N95.2 conjugated estrogens (PREMARIN) 0.625 MG/GM vaginal cream       Her bowels are regular and she is voiding without difficulty. She complains of a creamy discharge without odor. Not sexually active. She has symptoms of overflow incontinence without symptoms of MICHAEL. She has been DX with a cystocele grade 2.  She was offered surgical correction and pessary placement      Past Medical History:   Diagnosis Date    Anesthesia complication     PATIENT WAKES UP CRYING AFTER ANESTHESIA    Cataract     BILATERAL EYES    CKD (chronic kidney disease) stage 3, GFR 30-59 ml/min ([facility])     Constipation     Fibromyalgia     Frequent stools     GERD (gastroesophageal reflux disease)     Hiatal hernia     High calcium levels     History of blood transfusion 1990    AUTOLOGUS X1 AFTER HYSTERECTOMY    Hypercholesterolemia     OA (osteoarthritis) 10/28/2013    Osteoarthritis     Parathyroid adenoma     Slow urinary stream     Snores     Wears glasses          Past Surgical History:   Procedure Laterality Date    APPENDECTOMY  1964    CARPAL TUNNEL RELEASE Bilateral     CHOLECYSTECTOMY      COLONOSCOPY      1.4.2011, 8.23.2011    CYST REMOVAL Left     GANGLION REMOVED 2 TIMES    ENDOSCOPY, COLON, DIAGNOSTIC  7/13/200512/20/2010    EGD    HYSTERECTOMY  1990    UTERUS ONLY    PARATHYROIDECTOMY  2018    VT EXPLORE PARATHYROID GLANDS Left 2018    PARATHYROIDECTOMY LOWER, NIM MONITOR performed by Milind Mendoza MD at [address] [facility] ENDOSCOPY      2005, 2010    UPPER GASTROINTESTINAL ENDOSCOPY           Family History   Problem Relation Age of Onset    Diabetes Mother     Heart Disease Mother     High Blood Pressure Mother     Heart Disease Father     Diabetes Father         RESOLVED WITH WEIGHT LOSS    High Blood Pressure Father     Kidney Disease Father     Cancer Maternal Grandmother         colon, uterine, breast    Diabetes Maternal Grandmother          Social History     Tobacco Use    Smoking status: Former Smoker     Packs/day: 2.00     Years: 20.00     Pack years: 40.00     Types: Cigarettes     Last attempt to quit: 1990     Years since quittin.4    Smokeless tobacco: Never Used   Substance Use Topics    Alcohol use: No    Drug use: No         MEDICATIONS:  Current Outpatient Medications   Medication Sig Dispense Refill    conjugated estrogens (PREMARIN) 0.625 MG/GM vaginal cream Place 0.5 g vaginally 2 times daily Begin 3 weeks before surgery 1 Tube 3    atorvastatin (LIPITOR) 10 MG tablet TAKE 1 TABLET BY MOUTH ONE TIME A DAY  90 tablet 0    Multiple Vitamins-Minerals (THERAPEUTIC MULTIVITAMIN-MINERALS) tablet Take 1 tablet by mouth daily      omeprazole (PRILOSEC) 20 MG delayed release capsule TAKE 1 CAPSULE BY MOUTH TWICE DAILY 180 capsule 1    acetaminophen (TYLENOL) 500 MG tablet Take 1,000 mg by mouth 2 times daily as needed       DiphenhydrAMINE HCl (BENADRYL ALLERGY PO) Take 25 mg by mouth every 6 hours as needed Takes 1 -2  At a time      Handicap Placard MISC It is my medical opinin that [employer] requires a disability parking placard for the following reasons:  She has limited walking ability due to an arthritic condition.   Duration of need: permanent 1 each 0   

## 2020-01-08 ENCOUNTER — OFFICE VISIT (OUTPATIENT)
Dept: ORTHOPEDIC SURGERY | Age: 70
End: 2020-01-08
Payer: MEDICARE

## 2020-01-08 ENCOUNTER — TELEPHONE (OUTPATIENT)
Dept: OBGYN CLINIC | Age: 70
End: 2020-01-08

## 2020-01-08 VITALS — BODY MASS INDEX: 37.17 KG/M2 | WEIGHT: 223.11 LBS | HEIGHT: 65 IN

## 2020-01-08 LAB
C TRACH DNA GENITAL QL NAA+PROBE: NEGATIVE
CULTURE: NORMAL
Lab: NORMAL
N. GONORRHOEAE DNA: NEGATIVE
SPECIMEN DESCRIPTION: NORMAL
SPECIMEN DESCRIPTION: NORMAL

## 2020-01-08 PROCEDURE — 20610 DRAIN/INJ JOINT/BURSA W/O US: CPT | Performed by: PHYSICIAN ASSISTANT

## 2020-01-08 PROCEDURE — 99024 POSTOP FOLLOW-UP VISIT: CPT | Performed by: PHYSICIAN ASSISTANT

## 2020-01-08 RX ORDER — LIDOCAINE HYDROCHLORIDE 10 MG/ML
4 INJECTION, SOLUTION EPIDURAL; INFILTRATION; INTRACAUDAL; PERINEURAL ONCE
Status: COMPLETED | OUTPATIENT
Start: 2020-01-08 | End: 2020-01-08

## 2020-01-08 RX ORDER — METRONIDAZOLE 500 MG/1
500 TABLET ORAL 2 TIMES DAILY
Qty: 14 TABLET | Refills: 0 | Status: SHIPPED | OUTPATIENT
Start: 2020-01-08 | End: 2020-01-15

## 2020-01-08 RX ADMIN — LIDOCAINE HYDROCHLORIDE 4 ML: 10 INJECTION, SOLUTION EPIDURAL; INFILTRATION; INTRACAUDAL; PERINEURAL at 14:59

## 2020-01-08 RX ADMIN — LIDOCAINE HYDROCHLORIDE 4 ML: 10 INJECTION, SOLUTION EPIDURAL; INFILTRATION; INTRACAUDAL; PERINEURAL at 14:58

## 2020-01-08 ASSESSMENT — ENCOUNTER SYMPTOMS
NAUSEA: 0
EYES NEGATIVE: 1
VOMITING: 0
COLOR CHANGE: 0
COUGH: 0
RHINORRHEA: 0

## 2020-01-08 NOTE — TELEPHONE ENCOUNTER
Attempted to reach patient in regards to results and plan of care. Patient did not answer, voicemail left, awaiting call back.

## 2020-01-08 NOTE — TELEPHONE ENCOUNTER
----- Message from TOI Orantes - CNP sent at 1/7/2020  6:32 PM EST -----  +BV- flagyl 500mg PO bID X 7 days  Hold UA for C&S results

## 2020-01-08 NOTE — TELEPHONE ENCOUNTER
Patient returned Marta Barthel call and she is aware of her results. She also states she went to  the premarin cream and it was 250$ after her insurance. She states that she cannot afford that and is going to try and use a coupon from online. RX sent per [facility] CNP.

## 2020-01-09 ENCOUNTER — TELEPHONE (OUTPATIENT)
Dept: OBGYN CLINIC | Age: 70
End: 2020-01-09

## 2020-01-09 RX ORDER — ESTRADIOL 0.1 MG/G
0.5 CREAM VAGINAL 2 TIMES DAILY
Qty: 1 TUBE | Refills: 3 | Status: SHIPPED | OUTPATIENT
Start: 2020-01-09 | End: 2021-07-21

## 2020-01-09 NOTE — TELEPHONE ENCOUNTER
Attempted to reach patient in regards to alternative for premarin cream. Office does not have samples. Patient did not answer, voicemail left, awaiting call back.

## 2020-01-09 NOTE — TELEPHONE ENCOUNTER
Patient can do estrace cream- which is preferred with her insurance or she can use the 1 premarin cream sample and discount card to purchase the rest.

## 2020-01-09 NOTE — TELEPHONE ENCOUNTER
Patient called the office on 1/8/20, requesting alternate Rx for premarin ( due to high cost), however would like samples if we have any.   Please call her @ [phone]

## 2020-01-22 ENCOUNTER — PREP FOR PROCEDURE (OUTPATIENT)
Dept: OBGYN CLINIC | Age: 70
End: 2020-01-22

## 2020-01-22 ENCOUNTER — OFFICE VISIT (OUTPATIENT)
Dept: OBGYN CLINIC | Age: 70
End: 2020-01-22
Payer: MEDICARE

## 2020-01-22 VITALS
SYSTOLIC BLOOD PRESSURE: 110 MMHG | HEIGHT: 65 IN | DIASTOLIC BLOOD PRESSURE: 80 MMHG | HEART RATE: 69 BPM | BODY MASS INDEX: 36.99 KG/M2 | WEIGHT: 222 LBS

## 2020-01-22 DIAGNOSIS — Z01.818 PREOP EXAMINATION: Primary | ICD-10-CM

## 2020-01-22 DIAGNOSIS — Z01.818 PREOP TESTING: ICD-10-CM

## 2020-01-22 PROCEDURE — 99213 OFFICE O/P EST LOW 20 MIN: CPT | Performed by: OBSTETRICS & GYNECOLOGY

## 2020-01-22 RX ORDER — SODIUM CHLORIDE 0.9 % (FLUSH) 0.9 %
10 SYRINGE (ML) INJECTION PRN
Status: CANCELLED | OUTPATIENT
Start: 2020-01-22

## 2020-01-22 RX ORDER — SODIUM CHLORIDE, SODIUM LACTATE, POTASSIUM CHLORIDE, CALCIUM CHLORIDE 600; 310; 30; 20 MG/100ML; MG/100ML; MG/100ML; MG/100ML
INJECTION, SOLUTION INTRAVENOUS CONTINUOUS
Status: CANCELLED | OUTPATIENT
Start: 2020-01-22

## 2020-01-22 RX ORDER — SODIUM CHLORIDE 0.9 % (FLUSH) 0.9 %
10 SYRINGE (ML) INJECTION EVERY 12 HOURS SCHEDULED
Status: CANCELLED | OUTPATIENT
Start: 2020-01-22

## 2020-01-22 NOTE — PROGRESS NOTES
[address] [facility] Gynecology  [facility]; [address]  [address]  [phone] mn [phone] Fax        Irlanda Fisher  1950  Primary Care Physician: Hemant Syed MD        [address]: [facility]      2020  MEDICAID CONSENT COMPLETED: No      HPI: Irlanda Fisher is a 71 y.o. female  (Term vaginal)-Not sexually active. She has symptoms of overflow incontinence without symptoms of MICHAEL. She has been DX with a cystocele grade 2.  She was offered surgical correction and pessary placement    Relevant Past History:   Patient Active Problem List    Diagnosis Date Noted    [place]-Walker grade 2-3 cystocele 2020     Priority: High    Overflow incontinence 2020     Priority: High    CKD (chronic kidney disease) stage 3, GFR 30-59 ml/min (HCC)      Priority: Medium    OA (osteoarthritis) 10/28/2013     Priority: Medium    GERD (gastroesophageal reflux disease) 2012     Priority: Medium    Acute pain of left knee 2019    Obesity, Class II, BMI 35-39.9 2018    Pancreatitis 2018    Osteoarthritis     Hypercholesterolemia     Hiatal hernia     Pure hypercholesterolemia 2018    Fibromyalgia 10/28/2013    Health care maintenance 2013         OB History    Para Term  AB Living   1 1 1 0 0 1   SAB TAB Ectopic Molar Multiple Live Births   0 0 0 0 0 1      # Outcome Date GA Lbr Chang/2nd Weight Sex Delivery Anes PTL Lv   1 Term 5    F Vag-Spont   PANDA       Past Medical History:   Diagnosis Date    Anesthesia complication     PATIENT WAKES UP CRYING AFTER ANESTHESIA    Cataract     BILATERAL EYES    CKD (chronic kidney disease) stage 3, GFR 30-59 ml/min (HCC)     Constipation     Fibromyalgia     Frequent stools     GERD (gastroesophageal reflux disease)     Hiatal hernia     High calcium levels     History of blood transfusion     AUTOLOGUS X1 AFTER HYSTERECTOMY    Hypercholesterolemia     OA (osteoarthritis) 10/28/2013    Osteoarthritis     Parathyroid adenoma     Slow urinary stream     Snores     Wears glasses        Past Surgical History:   Procedure Laterality Date    APPENDECTOMY  1964    CARPAL TUNNEL RELEASE Bilateral     CHOLECYSTECTOMY      COLONOSCOPY      1.., 8..    CYST REMOVAL Left     GANGLION REMOVED 2 TIMES    ENDOSCOPY, COLON, DIAGNOSTIC      EGD    HYSTERECTOMY      UTERUS ONLY (GURU)    PARATHYROIDECTOMY  2018    CT EXPLORE PARATHYROID GLANDS Left 2018    PARATHYROIDECTOMY LOWER, NIM MONITOR performed by Teofilo Marroquin MD at [address] [place] [address],[address] ENDOSCOPY      2005, 2010    UPPER GASTROINTESTINAL ENDOSCOPY         Social History     Socioeconomic History    Marital status:      Spouse name: Not on file    Number of children: Not on file    Years of education: Not on file    Highest education level: Not on file   Occupational History    Not on file   Social Needs    Financial resource strain: Not on file    Food insecurity:     Worry: Not on file     Inability: Not on file    Transportation needs:     Medical: Not on file     Non-medical: Not on file   Tobacco Use    Smoking status: Former Smoker     Packs/day: 2.00     Years: 20.00     Pack years: 40.00     Types: Cigarettes     Last attempt to quit: 1990     Years since quittin.4    Smokeless tobacco: Never Used   Substance and Sexual Activity    Alcohol use: No    Drug use: No    Sexual activity: Not on file   Lifestyle    Physical activity:     Days per week: Not on file     Minutes per session: Not on file    Stress: Not on file   Relationships    Social connections:     Talks on phone: Not on file     Gets together: Not on file     Attends Latter-day service: Not on file     Active member of club or organization: Not on file     Attends meetings of clubs or organizations: Not on file     Relationship status: Not on file    Intimate partner violence:     Fear of current or ex partner: Not on file     Emotionally abused: Not on file     Physically abused: Not on file     Forced sexual activity: Not on file   Other Topics Concern    Not on file   Social History Narrative    Not on file       Psychosocial History: Stable    MEDICATIONS:  Current Outpatient Medications   Medication Sig Dispense Refill    estradiol (ESTRACE VAGINAL) 0.1 MG/GM vaginal cream Place 0.5 g vaginally 2 times daily 1 Tube 3    conjugated estrogens (PREMARIN) 0.625 MG/GM vaginal cream Place 0.5 g vaginally 2 times daily Begin 3 weeks before surgery 1 Tube 3    atorvastatin (LIPITOR) 10 MG tablet TAKE 1 TABLET BY MOUTH ONE TIME A DAY  90 tablet 0    Multiple Vitamins-Minerals (THERAPEUTIC MULTIVITAMIN-MINERALS) tablet Take 1 tablet by mouth daily      omeprazole (PRILOSEC) 20 MG delayed release capsule TAKE 1 CAPSULE BY MOUTH TWICE DAILY 180 capsule 1    acetaminophen (TYLENOL) 500 MG tablet Take 1,000 mg by mouth 2 times daily as needed       DiphenhydrAMINE HCl (BENADRYL ALLERGY PO) Take 25 mg by mouth every 6 hours as needed Takes 1 -2  At a time      Handicap Placard MISC It is my medical opinin that [employer] requires a disability parking placard for the following reasons:  She has limited walking ability due to an arthritic condition. Duration of need: permanent 1 each 0    Probiotic Product (PROBIOTIC DAILY PO) Take 1 capsule by mouth daily        No current facility-administered medications for this visit. ALLERGIES:  Allergies as of 01/22/2020 - Review Complete 01/22/2020   Allergen Reaction Noted    Seasonal Other (See Comments) 08/28/2018           REVIEW OF SYSTEMS:      General appearance:Appears healthy. Alert; in no acute distress. Pleasant.   HEENT: + Glasses   CARDIOVASCULAR: Denies: chest pain, dyspnea on exertion, palpitations  RESPIRATORY: Denies: cough, shortness of breath, wheezing  GI: Denies: abdominal pain, flank pain, nausea, vomiting, diarrhea  : Denies: dysuria, frequency/urgency, hematuria, pelvic pain  MUSCULOSKELETAL: Denies: back pain, joint swelling, muscle pain  SKIN: Denies: rash, hives. HEMATOLOGIC: Denies Bleeding Disorder, Coagulopathy; Autologous Transfusion 1990  NEUROLOGIC: Denies Migraines, Headaches, CVA, TIA  ENDOCRINE: Denies any Endocrinologic disorders                PHYSICAL EXAM:  Blood pressure 110/80, pulse 69, height 5' 5\" (1.651 m), weight 222 lb (100.7 kg), not currently breastfeeding. General Appearance:  awake, alert, oriented, in no acute distress, well developed, well nourished, in no acute distress   Skin:  Skin color, texture, turgor normal. No rashes or lesions  Mouth/Throat:  Mucosa moist.  No lesions. Pharynx without erythema, edema or exudate. Lungs:  Normal expansion. Clear to auscultation. No rales, rhonchi, or wheezing. Heart:  Heart sounds are normal.  Regular rate and rhythm without murmur, gallop or rub. Breast:  Inspection negative. No nipple discharge or bleeding. No palpable mass  Abdomen:  Soft, non-tender, normal bowel sounds. No bruits, organomegaly or masses. Extremities: Extremities warm to touch, pink, with no edema. Musculoskeletal:  negative  Peripheral Pulses:  Normal  Neurologic:  Gait normal. Reflexes normal and symmetric. Sensation grossly intact. Female Urogen:  Declined  Female Rectal: Declined    OMM Structural Component:  The patient did not complain of a Chief complaint requiring OMM. Chief Complaint:none    Structural Exam: No Interest              Diagnostics:  No results found. Lab Results:  Results for orders placed or performed during the hospital encounter of 01/07/20   C.trachomatis N.gonorrhoeae DNA   Result Value Ref Range    Specimen Description . VAGINAL SPECIMEN     C. trachomatis DNA NEGATIVE NEGATIVE    N. gonorrhoeae DNA NEGATIVE NEGATIVE   VAGINITIS DNA 01/07/2020     Priority: High    CKD (chronic kidney disease) stage 3, GFR 30-59 ml/min ([facility])      Priority: Medium    OA (osteoarthritis) 10/28/2013     Priority: Medium    GERD (gastroesophageal reflux disease) 07/06/2012     Priority: Medium    Acute pain of left knee 11/20/2019    Obesity, Class II, BMI 35-39.9 09/19/2018    Pancreatitis 09/12/2018    Osteoarthritis     Hypercholesterolemia     Hiatal hernia     Pure hypercholesterolemia 04/13/2018    Fibromyalgia 10/28/2013    Health care maintenance 04/25/2013             Plan:  1. Anterior Colporrhaphy  2. Premarin cream bid 3 weeks pre-op  No orders of the defined types were placed in this encounter. Counseling: The patient was counseled on all options both medical and surgical, conservative as well as definitive. She has elected to proceed with the procedure as stated above. The patient was counseled on the procedure. Risks and complications were reviewed in detail. The patients orders, labs, consents have been completed. The history and physical as well as all supporting surgical documentation will be forwarded to the pre-operative holding area. The patient is aware that this procedure may not alleviate her symptoms. That there may be a necessity for a second surgery and that there may be an incomplete removal of abnormal tissue. I reviewed the risk of breakdown and further surgery. I reviewed pessary options for which she declined.                   ________________________________________D. O. Date:_______________  Sky Flores DO        Patient was seen with total face to face time of 15 minutes. More than 50% of this visit was on counseling and education regarding her    Diagnosis Orders   1. [place]-Walker grade 2 cystocele     2. Overflow incontinence     3. Atrophy of vagina      and her options. She was also counseled on her preventative health maintenance recommendations and follow-up.

## 2020-02-05 ENCOUNTER — HOSPITAL ENCOUNTER (OUTPATIENT)
Dept: PREADMISSION TESTING | Age: 70
Discharge: HOME | End: 2020-02-09
Payer: MEDICARE

## 2020-02-05 ENCOUNTER — HOSPITAL ENCOUNTER (OUTPATIENT)
Dept: GENERAL RADIOLOGY | Age: 70
Discharge: HOME | End: 2020-02-07
Payer: MEDICARE

## 2020-02-05 VITALS
OXYGEN SATURATION: 95 % | TEMPERATURE: 97.7 F | DIASTOLIC BLOOD PRESSURE: 71 MMHG | HEIGHT: 65 IN | SYSTOLIC BLOOD PRESSURE: 151 MMHG | HEART RATE: 108 BPM | WEIGHT: 224.4 LBS | RESPIRATION RATE: 20 BRPM | BODY MASS INDEX: 37.39 KG/M2

## 2020-02-05 LAB
ABO/RH: NORMAL
ABSOLUTE EOS #: 0.2 K/UL (ref 0–0.4)
ABSOLUTE IMMATURE GRANULOCYTE: ABNORMAL K/UL (ref 0–0.3)
ABSOLUTE LYMPH #: 1.6 K/UL (ref 1–4.8)
ABSOLUTE MONO #: 0.5 K/UL (ref 0.1–1.3)
ANION GAP SERPL CALCULATED.3IONS-SCNC: 10 MMOL/L (ref 9–17)
ANTIBODY SCREEN: NEGATIVE
ARM BAND NUMBER: NORMAL
BASOPHILS # BLD: 1 % (ref 0–2)
BASOPHILS ABSOLUTE: 0.1 K/UL (ref 0–0.2)
BILIRUBIN URINE: NEGATIVE
BLOOD BANK COMMENT: NORMAL
BUN BLDV-MCNC: 19 MG/DL (ref 8–23)
BUN/CREAT BLD: ABNORMAL (ref 9–20)
CALCIUM SERPL-MCNC: 9.5 MG/DL (ref 8.6–10.4)
CHLORIDE BLD-SCNC: 108 MMOL/L (ref 98–107)
CO2: 26 MMOL/L (ref 20–31)
COLOR: YELLOW
COMMENT UA: NORMAL
CREAT SERPL-MCNC: 1.1 MG/DL (ref 0.5–0.9)
DIFFERENTIAL TYPE: ABNORMAL
EOSINOPHILS RELATIVE PERCENT: 4 % (ref 0–4)
EXPIRATION DATE: NORMAL
GFR AFRICAN AMERICAN: 60 ML/MIN
GFR NON-AFRICAN AMERICAN: 49 ML/MIN
GFR SERPL CREATININE-BSD FRML MDRD: ABNORMAL ML/MIN/{1.73_M2}
GFR SERPL CREATININE-BSD FRML MDRD: ABNORMAL ML/MIN/{1.73_M2}
GLUCOSE BLD-MCNC: 112 MG/DL (ref 70–99)
GLUCOSE URINE: NEGATIVE
HCT VFR BLD CALC: 39.9 % (ref 36–46)
HEMOGLOBIN: 13.2 G/DL (ref 12–16)
IMMATURE GRANULOCYTES: ABNORMAL %
INR BLD: 0.9
KETONES, URINE: NEGATIVE
LEUKOCYTE ESTERASE, URINE: NEGATIVE
LYMPHOCYTES # BLD: 25 % (ref 24–44)
MCH RBC QN AUTO: 29.5 PG (ref 26–34)
MCHC RBC AUTO-ENTMCNC: 33 G/DL (ref 31–37)
MCV RBC AUTO: 89.5 FL (ref 80–100)
MONOCYTES # BLD: 8 % (ref 1–7)
NITRITE, URINE: NEGATIVE
NRBC AUTOMATED: ABNORMAL PER 100 WBC
PARTIAL THROMBOPLASTIN TIME: 41.6 SEC (ref 24–36)
PDW BLD-RTO: 14.3 % (ref 11.5–14.9)
PH UA: 5.5 (ref 5–8)
PLATELET # BLD: 175 K/UL (ref 150–450)
PLATELET ESTIMATE: ABNORMAL
PMV BLD AUTO: 8.8 FL (ref 6–12)
POTASSIUM SERPL-SCNC: 5.2 MMOL/L (ref 3.7–5.3)
PROTEIN UA: NEGATIVE
PROTHROMBIN TIME: 12 SEC (ref 11.8–14.6)
RBC # BLD: 4.46 M/UL (ref 4–5.2)
RBC # BLD: ABNORMAL 10*6/UL
SEG NEUTROPHILS: 62 % (ref 36–66)
SEGMENTED NEUTROPHILS ABSOLUTE COUNT: 3.9 K/UL (ref 1.3–9.1)
SODIUM BLD-SCNC: 144 MMOL/L (ref 135–144)
SPECIFIC GRAVITY UA: 1.02 (ref 1–1.03)
TURBIDITY: CLEAR
URINE HGB: NEGATIVE
UROBILINOGEN, URINE: NORMAL
WBC # BLD: 6.3 K/UL (ref 3.5–11)
WBC # BLD: ABNORMAL 10*3/UL

## 2020-02-05 PROCEDURE — 80048 BASIC METABOLIC PNL TOTAL CA: CPT

## 2020-02-05 PROCEDURE — 81003 URINALYSIS AUTO W/O SCOPE: CPT

## 2020-02-05 PROCEDURE — 85730 THROMBOPLASTIN TIME PARTIAL: CPT

## 2020-02-05 PROCEDURE — 85025 COMPLETE CBC W/AUTO DIFF WBC: CPT

## 2020-02-05 PROCEDURE — 86850 RBC ANTIBODY SCREEN: CPT

## 2020-02-05 PROCEDURE — 93005 ELECTROCARDIOGRAM TRACING: CPT

## 2020-02-05 PROCEDURE — 36415 COLL VENOUS BLD VENIPUNCTURE: CPT

## 2020-02-05 PROCEDURE — 86901 BLOOD TYPING SEROLOGIC RH(D): CPT

## 2020-02-05 PROCEDURE — 71046 X-RAY EXAM CHEST 2 VIEWS: CPT

## 2020-02-05 PROCEDURE — 86900 BLOOD TYPING SEROLOGIC ABO: CPT

## 2020-02-05 PROCEDURE — 85610 PROTHROMBIN TIME: CPT

## 2020-02-05 PROCEDURE — 87086 URINE CULTURE/COLONY COUNT: CPT

## 2020-02-06 ENCOUNTER — TELEPHONE (OUTPATIENT)
Dept: OBGYN CLINIC | Age: 70
End: 2020-02-06

## 2020-02-06 LAB
CULTURE: NORMAL
EKG ATRIAL RATE: 56 BPM
EKG P AXIS: 2 DEGREES
EKG P-R INTERVAL: 194 MS
EKG Q-T INTERVAL: 458 MS
EKG QRS DURATION: 112 MS
EKG QTC CALCULATION (BAZETT): 441 MS
EKG R AXIS: -27 DEGREES
EKG T AXIS: 22 DEGREES
EKG VENTRICULAR RATE: 56 BPM
Lab: NORMAL
SPECIMEN DESCRIPTION: NORMAL

## 2020-02-06 PROCEDURE — 93010 ELECTROCARDIOGRAM REPORT: CPT | Performed by: INTERNAL MEDICINE

## 2020-02-06 NOTE — TELEPHONE ENCOUNTER
Writer spoke with patient as requested by Dr. Lyndsey Alexander in regards to elevated PTT and creatinine level. Per physician, patient advised to repeat PTT level on Monday, prior to surgical operation on Wednesday, along with factor 8 testing. Patient further advised to decrease caffeine intake, with increasing water intake prior to operation next week. Patient also questioned if she is taking aspirin, ibuprofen, or naproxen. Patient denied, stating she only takes an occasional tylenol. Patient advised to avoid aspirin, ibuprofen and naproxen products prior to surgical operation, as requested by physician. Patient voiced an understanding and is aware to complete repeat blood work on Monday. Orders placed accordingly.

## 2020-02-10 ENCOUNTER — HOSPITAL ENCOUNTER (OUTPATIENT)
Age: 70
Discharge: HOME | End: 2020-02-10
Payer: MEDICARE

## 2020-02-10 LAB — PARTIAL THROMBOPLASTIN TIME: 45.3 SEC (ref 24–36)

## 2020-02-10 PROCEDURE — 85730 THROMBOPLASTIN TIME PARTIAL: CPT

## 2020-02-10 PROCEDURE — 36415 COLL VENOUS BLD VENIPUNCTURE: CPT

## 2020-02-10 PROCEDURE — 85240 CLOT FACTOR VIII AHG 1 STAGE: CPT

## 2020-02-11 ENCOUNTER — TELEPHONE (OUTPATIENT)
Dept: OBGYN CLINIC | Age: 70
End: 2020-02-11

## 2020-02-11 NOTE — TELEPHONE ENCOUNTER
Writer spoke with Dr. Jean Bryson related to patient's scheduled cystocele tomorrow and elevated PTT lab value. Per patient, she has never had trouble or complications with surgical operations and bleeding. Surgical scheduler spoke with patient's PCP Dr. Asim De La Rosa, who determined that the patient is acceptable to proceed with surgery given PTT lab value, but formal written clearance cannot be provided as patient is unable to consult with PCP today. It is documented in the patient's surgical history in the medical record that she underwent a parathyroidectomy procedure in 2018. Per the operative noted from this procedure on 9/4/18, patient had no complications with the operation. At this time, factor 8 assay lab value is pending. Per [employer], factor 8 assay can take up to three days to finalize. At this time, this result is expected to finalize prior to her operation. Physician to review prior to surgery. Patient offered to cancel surgery and to consult with hematology prior to operation, which can be rescheduled to a later date if indicated. At this time, patient elects to proceed with operation while awaiting factor 8 assay to result. Patient aware that blood transfusion may be warranted during or following procedure. At this time, patient's case is scheduled mid afternoon.

## 2020-02-12 ENCOUNTER — HOSPITAL ENCOUNTER (OUTPATIENT)
Age: 70
Discharge: HOME | End: 2020-02-12
Payer: MEDICARE

## 2020-02-12 ENCOUNTER — TELEPHONE (OUTPATIENT)
Dept: OBGYN CLINIC | Age: 70
End: 2020-02-12

## 2020-02-12 LAB — FACTOR VIII ACTIVITY: 274 % (ref 50–150)

## 2020-02-12 PROCEDURE — 85245 CLOT FACTOR VIII VW RISTOCTN: CPT

## 2020-02-12 PROCEDURE — 85246 CLOT FACTOR VIII VW ANTIGEN: CPT

## 2020-02-12 PROCEDURE — 36415 COLL VENOUS BLD VENIPUNCTURE: CPT

## 2020-02-12 NOTE — TELEPHONE ENCOUNTER
Spoke with Dr. Elida Snyder related to patient's abnormal Factor VIII activity. Per physician, patient is advised to follow up with hematology stat with referral, complete Von Willebrand panel with Factor 8 Ristocetin Factor. Orders placed per physician. Patient advised of recommendations and orders placed accordingly. Patient stated she would complete lab work today. Patient encouraged to return call to office if no response is heard from hematology's office.

## 2020-02-14 LAB — RISTOCETIN CO-FACTOR: 213 % (ref 51–215)

## 2020-02-19 LAB
SEND OUT REPORT: NORMAL
TEST NAME: NORMAL

## 2020-02-25 ENCOUNTER — INITIAL CONSULT (OUTPATIENT)
Dept: ONCOLOGY | Age: 70
End: 2020-02-25
Payer: MEDICARE

## 2020-02-25 ENCOUNTER — TELEPHONE (OUTPATIENT)
Dept: ONCOLOGY | Age: 70
End: 2020-02-25

## 2020-02-25 VITALS
BODY MASS INDEX: 36.76 KG/M2 | HEART RATE: 64 BPM | SYSTOLIC BLOOD PRESSURE: 152 MMHG | RESPIRATION RATE: 18 BRPM | WEIGHT: 215.3 LBS | DIASTOLIC BLOOD PRESSURE: 89 MMHG | HEIGHT: 64 IN | TEMPERATURE: 98.3 F

## 2020-02-25 PROCEDURE — 99201 HC NEW PT, E/M LEVEL 1: CPT | Performed by: INTERNAL MEDICINE

## 2020-02-25 PROCEDURE — 99204 OFFICE O/P NEW MOD 45 MIN: CPT | Performed by: INTERNAL MEDICINE

## 2020-02-25 NOTE — TELEPHONE ENCOUNTER
Pt having labs at [facility] on 2/26/2020-lab slips given to pt-pt to return to [facility] to see Dr Wayne Downs on 3/17/2020

## 2020-02-25 NOTE — LETTER
Colonoscopy; Upper gastrointestinal endoscopy; Upper gastrointestinal endoscopy; Appendectomy (1964); Cholecystectomy; Endoscopy, colon, diagnostic (7/13/200512/20/2010); Hysterectomy (1990); parathyroidectomy (09/04/2018); and pr explore parathyroid glands (Left, 9/4/2018). CURRENT MEDICATIONS:  has a current medication list which includes the following prescription(s): estradiol, atorvastatin, omeprazole, acetaminophen, diphenhydramine hcl, and handicap placard. ALLERGIES:  is allergic to seasonal.    FAMILY HISTORY: Negative for any hematological or oncological conditions. SOCIAL HISTORY:  reports that she quit smoking about 29 years ago. Her smoking use included cigarettes. She has a 40.00 pack-year smoking history. She has never used smokeless tobacco. She reports that she does not drink alcohol or use drugs. REVIEW OF SYSTEMS:   General: No fever or night sweats. Weight is stable. ENT: No double or blurred vision, no tinnitus or hearing problem, no dysphagia or sore throat   Respiratory: No chest pain, no shortness of breath, no cough or hemoptysis. Cardiovascular: Denies chest pain, PND or orthopnea. No L E swelling or palpitations. Gastrointestinal: No nausea or vomiting, abdominal pain, diarrhea or constipation. Genitourinary: Denies dysuria, hematuria, frequency, urgency or incontinence. Neurological: Denies headaches, decreased LOC, no sensory or motor focal deficits. Musculoskeletal: No arthralgia no back pain or joint swelling. Skin: There are no rashes or bleeding. Psychiatric: No anxiety, no depression. ndocrine: No diabetes or thyroid disease. Hematologic: No bleeding, no adenopathy. PHYSICAL EXAM: Shows a well appearing 71y.o.-year-old female who is not in pain or distress. Vital Signs: Blood pressure (!) 152/89, pulse 64, temperature 98.3 °F (36.8 °C), temperature source Oral, resp.  rate 18, height 5' 4\" (1.626 m), weight 215 lb 4.8 oz (97.7 kg), not currently breastfeeding. HEENT: Normocephalic and atraumatic. Pupils are equal, round, reactive to light and accommodation. Extraocular muscles are intact. Neck: Showed no JVD, no carotid bruit . Lungs: Clear to auscultation bilaterally. Heart: Regular without any murmur. Abdomen: Soft, nontender. No hepatosplenomegaly. Extremities: Lower extremities show no edema, clubbing, or cyanosis. Breasts: Examination not done today. Neuro exam: intact cranial nerves bilaterally no motor or sensory deficit, gait is normal. Lymphatic: no adenopathy appreciated in the supraclavicular, axillary, cervical or inguinal area    REVIEW OF LABORATORY DATA:   Lab Results   Component Value Date    WBC 6.3 02/05/2020    HGB 13.2 02/05/2020    HCT 39.9 02/05/2020    MCV 89.5 02/05/2020     02/05/2020       Chemistry        Component Value Date/Time     02/05/2020 1000    K 5.2 02/05/2020 1000     (H) 02/05/2020 1000    CO2 26 02/05/2020 1000    BUN 19 02/05/2020 1000    CREATININE 1.10 (H) 02/05/2020 1000        Component Value Date/Time    CALCIUM 9.5 02/05/2020 1000    ALKPHOS 62 04/06/2018    AST 13 04/06/2018    ALT 12 04/06/2018    BILITOT 0.6 04/06/2018        Lab Results   Component Value Date    INR 0.9 02/05/2020    PROTIME 12.0 02/05/2020     Lab Results   Component Value Date    APTT 45.3 (H) 02/10/2020     Results for Elgin Maharaj (MRN [id number]) as of 2/25/2020 15:01   Ref. Range 2/10/2020 09:27 2/12/2020 14:33   Factor VIII Activity Latest Ref Range: 50 - 150 % 274 (H)    Ristocetin Co-Factor Latest Ref Range: 51 - 215 %  213       REVIEW OF RADIOLOGICAL RESULTS:     IMPRESSION:   1. Prolonged PTT without bleeding or bruising   2. Plan for work up  3. Primary parathyroidism, S/P parathyroidectomy, 09/2018 normalized function,       PLAN:   1. We discussed her plan for cystocele and pre-procedure work up showed elevated PTT.    2. I had detailed discussion with the patient regarding clotting systems and explained her PLT counts are normal but her some of her clotting factors are abnormal.   3. We reviewed her current medications. 4. I discussed plan for work up for further evaluation for more definitive answers and to determine any risk with surgery or any potential future bleeding risk. 5. I assured her there is no concern for malignant process. 6. I am ordering mixing study as well as Factor VIII and IX studies. 7. Return for results                    If you have questions, please do not hesitate to call me. I look forward to following Tita along with you.     Sincerely,    Jeniffer Villalobos MD  Hematology/ Oncology  Cell [phone]

## 2020-02-25 NOTE — PROGRESS NOTES
concern for malignant process. 6. I am ordering mixing study as well as Factor VIII and IX studies.     7. Return for results

## 2020-02-26 ENCOUNTER — HOSPITAL ENCOUNTER (OUTPATIENT)
Age: 70
Discharge: HOME | End: 2020-02-26
Payer: MEDICARE

## 2020-02-26 PROCEDURE — 85280 CLOT FACTOR XII HAGEMAN: CPT

## 2020-02-26 PROCEDURE — 85730 THROMBOPLASTIN TIME PARTIAL: CPT

## 2020-02-26 PROCEDURE — 85240 CLOT FACTOR VIII AHG 1 STAGE: CPT

## 2020-02-26 PROCEDURE — 36415 COLL VENOUS BLD VENIPUNCTURE: CPT

## 2020-02-26 PROCEDURE — 85610 PROTHROMBIN TIME: CPT

## 2020-02-26 PROCEDURE — 85250 CLOT FACTOR IX PTC/CHRSTMAS: CPT

## 2020-02-26 PROCEDURE — 85300 ANTITHROMBIN III ACTIVITY: CPT

## 2020-02-28 LAB
FACTOR IX ACTIVITY: 143 % (ref 50–150)
FACTOR VIII ACTIVITY: 253 % (ref 50–150)
FACTOR XII ACTIVITY: 105 % (ref 35–150)
INR BLD: 0.9
PARTIAL THROMBOPLASTIN TIME: 28.3 SEC (ref 20.5–30.5)
PROTHROMBIN TIME: 9.9 SEC (ref 9–12)

## 2020-03-17 ENCOUNTER — OFFICE VISIT (OUTPATIENT)
Dept: ONCOLOGY | Age: 70
End: 2020-03-17
Payer: MEDICARE

## 2020-03-17 ENCOUNTER — TELEPHONE (OUTPATIENT)
Dept: ONCOLOGY | Age: 70
End: 2020-03-17

## 2020-03-17 VITALS
DIASTOLIC BLOOD PRESSURE: 84 MMHG | HEART RATE: 51 BPM | WEIGHT: 216.2 LBS | TEMPERATURE: 97.6 F | BODY MASS INDEX: 37.11 KG/M2 | SYSTOLIC BLOOD PRESSURE: 166 MMHG

## 2020-03-17 PROCEDURE — 99214 OFFICE O/P EST MOD 30 MIN: CPT | Performed by: INTERNAL MEDICINE

## 2020-03-17 NOTE — PROGRESS NOTES
DIAGNOSIS:   1. Prolonged  PTT  2. Elevated Factor VIII activity  3. Primary parathyroidism    CURRENT THERAPY:  S/P parathyroidectomy, 09/2018  Plan for work up for prolonged PTT     BRIEF CASE HISTORY:   Alice Yarbrough is a very pleasant 71 y.o. female who is referred to us for elevated PTT and Factor VIII activity. She has prolapsed bladder and pre-cystocele lab work showed prolonged clotting on 2 separate occasions as well as high Factor VIII activity, CBC showed normal PLT. She has previous surgical history with no clotting issues. She currently denies any bleeding or bruising currently. She takes Prilosec and Lipitor regularly and Benadryl and Tylenol as needed. She had primary parathyroidism, removed, benign, function has normalized. She is unaware of any hematological disorders in her family. She does not consume alcohol and has no history of illegal drug usage. She denies radiation or chemical exposure. Plan for mixing study as well as Factor VIII and IX studies    INTERIM HISTORY: The patient presents for follow up to review recent work up results. She was pre-screened with no fever and denies any cough, shortness of breath, or recent international travel. She reports frequent epistaxis as ped but resolved in adulthood. PAST MEDICAL HISTORY: has a past medical history of Anesthesia, Anesthesia complication, Cataract, CKD (chronic kidney disease) stage 3, GFR 30-59 ml/min ([facility]), Constipation, Fibromyalgia, Frequent stools, GERD (gastroesophageal reflux disease), Hiatal hernia, High calcium levels, History of blood transfusion, Hypercholesterolemia, OA (osteoarthritis), Osteoarthritis, Parathyroid adenoma, PONV (postoperative nausea and vomiting), Slow urinary stream, Snores, and Wears glasses. PAST SURGICAL HISTORY: has a past surgical history that includes Tonsillectomy; Carpal tunnel release (Bilateral); cyst removal (Left); Colonoscopy; Upper gastrointestinal endoscopy;  Upper gastrointestinal endoscopy; Appendectomy (1964); Cholecystectomy; Endoscopy, colon, diagnostic (7/13/200512/20/2010); Hysterectomy (1990); parathyroidectomy (09/04/2018); and pr explore parathyroid glands (Left, 9/4/2018). CURRENT MEDICATIONS:  has a current medication list which includes the following prescription(s): atorvastatin, estradiol, omeprazole, acetaminophen, diphenhydramine hcl, and handicap placard. ALLERGIES:  is allergic to seasonal.    FAMILY HISTORY: Negative for any hematological or oncological conditions. SOCIAL HISTORY:  reports that she quit smoking about 29 years ago. Her smoking use included cigarettes. She has a 40.00 pack-year smoking history. She has never used smokeless tobacco. She reports that she does not drink alcohol or use drugs. REVIEW OF SYSTEMS:   General: No fever or night sweats. Weight is stable. ENT: No double or blurred vision, no tinnitus or hearing problem, no dysphagia or sore throat   Respiratory: No chest pain, no shortness of breath, no cough or hemoptysis. Cardiovascular: Denies chest pain, PND or orthopnea. No L E swelling or palpitations. Gastrointestinal: No nausea or vomiting, abdominal pain, diarrhea or constipation. Genitourinary: Denies dysuria, hematuria, frequency, urgency or incontinence. Neurological: Denies headaches, decreased LOC, no sensory or motor focal deficits. Musculoskeletal: No arthralgia no back pain or joint swelling. Skin: There are no rashes or bleeding. Psychiatric: No anxiety, no depression. ndocrine: No diabetes or thyroid disease. Hematologic: No bleeding, no adenopathy. PHYSICAL EXAM: Shows a well appearing 71y.o.-year-old female who is not in pain or distress. Vital Signs: Blood pressure (!) 166/84, pulse 51, temperature 97.6 °F (36.4 °C), temperature source Oral, weight 216 lb 3.2 oz (98.1 kg), not currently breastfeeding. HEENT: Normocephalic and atraumatic.  Pupils are equal, round, reactive to light coagulation factors and her PTT normalized. 2. I am not sure what is the nature of her PTT elevation especially that her levels normalized. She could have transient factor deficiency or a transient and inflammatory condition or infection that can cause PTT prolongation or some transient liver dysfunction from medication. Unfortunately because her levels are normal now, it is impossible to continue the work-up and we can just simply speculate about the cause of her prolongation of PTT. 3. She is a symptomatic and we will notify her surgeon of the work-up and that she is cleared for surgery. 4. I educated the patient about the COVID-19. Information about the epidemic was shared with the patient. General information about safety precautions and protective care including hand hygiene and social distancing and avoidance of crowds was discussed with the patient. Routine checking for symptoms and temperature was advocated. The patient is asked to call us with any questions or concerns  They are specifically asked to contact us prior to their appointment if they have a fever or respiratory symptom. 5. Return in 6 months.

## 2020-08-11 ENCOUNTER — PREP FOR PROCEDURE (OUTPATIENT)
Dept: OBGYN CLINIC | Age: 70
End: 2020-08-11

## 2020-08-11 ENCOUNTER — OFFICE VISIT (OUTPATIENT)
Dept: OBGYN CLINIC | Age: 70
End: 2020-08-11
Payer: MEDICARE

## 2020-08-11 VITALS
SYSTOLIC BLOOD PRESSURE: 122 MMHG | WEIGHT: 225 LBS | TEMPERATURE: 98 F | DIASTOLIC BLOOD PRESSURE: 80 MMHG | BODY MASS INDEX: 37.49 KG/M2 | HEART RATE: 64 BPM | HEIGHT: 65 IN

## 2020-08-11 DIAGNOSIS — Z01.818 PREOP TESTING: Primary | ICD-10-CM

## 2020-08-11 PROCEDURE — 99213 OFFICE O/P EST LOW 20 MIN: CPT | Performed by: OBSTETRICS & GYNECOLOGY

## 2020-08-11 RX ORDER — SODIUM CHLORIDE, SODIUM LACTATE, POTASSIUM CHLORIDE, CALCIUM CHLORIDE 600; 310; 30; 20 MG/100ML; MG/100ML; MG/100ML; MG/100ML
INJECTION, SOLUTION INTRAVENOUS CONTINUOUS
Status: CANCELLED | OUTPATIENT
Start: 2020-08-11

## 2020-08-11 RX ORDER — SODIUM CHLORIDE 0.9 % (FLUSH) 0.9 %
10 SYRINGE (ML) INJECTION PRN
Status: CANCELLED | OUTPATIENT
Start: 2020-08-11

## 2020-08-11 RX ORDER — SODIUM CHLORIDE 0.9 % (FLUSH) 0.9 %
10 SYRINGE (ML) INJECTION EVERY 12 HOURS SCHEDULED
Status: CANCELLED | OUTPATIENT
Start: 2020-08-11

## 2020-08-11 NOTE — PROGRESS NOTES
AUTOLOGUS X1 AFTER HYSTERECTOMY    Hypercholesterolemia     OA (osteoarthritis) 10/28/2013    Osteoarthritis     Parathyroid adenoma     PONV (postoperative nausea and vomiting)     Slow urinary stream     Snores     Wears glasses        Past Surgical History:   Procedure Laterality Date    APPENDECTOMY  1964    CARPAL TUNNEL RELEASE Bilateral     CHOLECYSTECTOMY      COLONOSCOPY      1.., 8..    CYST REMOVAL Left     GANGLION REMOVED 2 TIMES    ENDOSCOPY, COLON, DIAGNOSTIC      EGD    HYSTERECTOMY      UTERUS ONLY (GURU)    PARATHYROIDECTOMY  2018    DC EXPLORE PARATHYROID GLANDS Left 2018    PARATHYROIDECTOMY LOWER, NIM MONITOR performed by Willard Garcia MD at [address] [place] [address],[address] ENDOSCOPY      2005, 2010    UPPER GASTROINTESTINAL ENDOSCOPY         Social History     Socioeconomic History    Marital status:      Spouse name: Not on file    Number of children: Not on file    Years of education: Not on file    Highest education level: Not on file   Occupational History    Not on file   Social Needs    Financial resource strain: Not on file    Food insecurity     Worry: Not on file     Inability: Not on file    Transportation needs     Medical: Not on file     Non-medical: Not on file   Tobacco Use    Smoking status: Former Smoker     Packs/day: 2.00     Years: 20.00     Pack years: 40.00     Types: Cigarettes     Last attempt to quit: 1990     Years since quittin.0    Smokeless tobacco: Never Used   Substance and Sexual Activity    Alcohol use: No    Drug use: No    Sexual activity: Not on file   Lifestyle    Physical activity     Days per week: Not on file     Minutes per session: Not on file    Stress: Not on file   Relationships    Social connections     Talks on phone: Not on file     Gets together: Not on file     Attends Evangel[employer] service: Not on file Active member of club or organization: Not on file     Attends meetings of clubs or organizations: Not on file     Relationship status: Not on file    Intimate partner violence     Fear of current or ex partner: Not on file     Emotionally abused: Not on file     Physically abused: Not on file     Forced sexual activity: Not on file   Other Topics Concern    Not on file   Social History Narrative    Not on file       Psychosocial History: stable    MEDICATIONS:  Current Outpatient Medications   Medication Sig Dispense Refill    omeprazole (PRILOSEC) 20 MG delayed release capsule Take 1 capsule by mouth twice daily 180 capsule 0    atorvastatin (LIPITOR) 10 MG tablet TAKE 1 TABLET BY MOUTH ONE TIME A DAY 90 tablet 0    estradiol (ESTRACE VAGINAL) 0.1 MG/GM vaginal cream Place 0.5 g vaginally 2 times daily 1 Tube 3    acetaminophen (TYLENOL) 500 MG tablet Take 1,000 mg by mouth 2 times daily as needed       DiphenhydrAMINE HCl (BENADRYL ALLERGY PO) Take 25 mg by mouth every 6 hours as needed Takes 1 -2  At a time      Handicap Placard MISC It is my medical opinin that Mauricio Molina requires a disability parking placard for the following reasons:  She has limited walking ability due to an arthritic condition. Duration of need: permanent 1 each 0     No current facility-administered medications for this visit. ALLERGIES:  Allergies as of 08/11/2020 - Review Complete 08/11/2020   Allergen Reaction Noted    Seasonal Other (See Comments) 08/28/2018           REVIEW OF SYSTEMS:      General appearance:Appears healthy. Alert; in no acute distress. Pleasant.   HEENT: + Glasses   CARDIOVASCULAR: Denies: chest pain, dyspnea on exertion, palpitations  RESPIRATORY: Denies: cough, shortness of breath, wheezing  GI: Denies: abdominal pain, flank pain, nausea, vomiting, diarrhea  : Denies: dysuria, frequency/urgency, hematuria, pelvic pain  MUSCULOSKELETAL: Denies: back pain, joint swelling, muscle pain  SKIN: Denies: rash, hives. HEMATOLOGIC: Denies Bleeding Disorder, Coagulopathy ; +Transfusion HX Autologous  NEUROLOGIC: Denies Migraines, Headaches, CVA, TIA  ENDOCRINE: Denies any Endocrinologic disorders                PHYSICAL EXAM:  Blood pressure 122/80, pulse 64, temperature 98 °F (36.7 °C), height 5' 5\" (1.651 m), weight 225 lb (102.1 kg), not currently breastfeeding. General Appearance:  awake, alert, oriented, in no acute distress, well developed, well nourished, in no acute distress   Skin:  Skin color, texture, turgor normal. No rashes or lesions  Mouth/Throat:  Mucosa moist.  No lesions. Pharynx without erythema, edema or exudate. Lungs:  Normal expansion. Clear to auscultation. No rales, rhonchi, or wheezing. Heart:  Heart sounds are normal.  Regular rate and rhythm without murmur, gallop or rub. Breast:  Inspection negative. No nipple discharge or bleeding. No palpable mass  Abdomen:  Soft, non-tender, normal bowel sounds. No bruits, organomegaly or masses. Extremities: Extremities warm to touch, pink, with no edema. Musculoskeletal:  negative  Peripheral Pulses:  Normal  Neurologic:  Gait normal. Reflexes normal and symmetric. Sensation grossly intact. Female Urogen: Declined  Female Rectal: Declined    OMM Structural Component:  The patient did not complain of a Chief complaint requiring OMM. Chief Complaint:none    Structural Exam: No Interest              Diagnostics:  No results found.     Lab Results:  Results for orders placed or performed during the hospital encounter of 02/26/20   Factor 12 Assay   Result Value Ref Range    Factor XII Activity 105 35 - 150 %   Factor 9 Assay   Result Value Ref Range    Factor IX Activity 143 50 - 150 %   Factor 8 Assay   Result Value Ref Range    Factor VIII Activity 253 (H) 50 - 150 %   Protime-INR   Result Value Ref Range    Protime 9.9 9.0 - 12.0 sec    INR 0.9    APTT   Result Value Ref Range    PTT 28.3 20.5 - 30.5 sec           The patient was counseled at length about the risks of michelet Covid-19 in the keegan-operative and post-operative states including the recovery window of their procedure. The patient was made aware that michelet Covid-19 after a surgical procedure may worsen their prognosis for recovering from the virus and lend to a higher morbidity and or mortality risk. The patient was given the options of postponing their procedure. All of the risks, benefits, and alternatives were discussed. The patient  does wish to proceed with the procedure. Assessment:     Diagnosis Orders   1. [place]-Walker grade 2 cystocele     2. Overflow incontinence     3. Atrophy of vagina     4. CKD (chronic kidney disease) stage 3, GFR 30-59 ml/min ([facility])     5. History of blood transfusion         Patient Active Problem List    Diagnosis Date Noted    [place]-Walker grade 2-3 cystocele 01/07/2020     Priority: High    Overflow incontinence 01/07/2020     Priority: High    CKD (chronic kidney disease) stage 3, GFR 30-59 ml/min ([facility])      Priority: Medium    OA (osteoarthritis) 10/28/2013     Priority: Medium    GERD (gastroesophageal reflux disease) 07/06/2012     Priority: Medium    Acute pain of left knee 11/20/2019    Obesity, Class II, BMI 35-39.9 09/19/2018    Pancreatitis 09/12/2018    Osteoarthritis     Hypercholesterolemia     Hiatal hernia     Pure hypercholesterolemia 04/13/2018    Fibromyalgia 10/28/2013    Health care maintenance 04/25/2013           Plan:  1. Anterior Colporrhaphy  2. Premarin cream bid 3 weeks pre-op  No orders of the defined types were placed in this encounter. Counseling: The patient was counseled on all options both medical and surgical, conservative as well as definitive. She has elected to proceed with the procedure as stated above. The patient was counseled on the procedure. Risks and complications were reviewed in detail. The patients orders, labs, consents have been completed. The history and physical as well as all supporting surgical documentation will be forwarded to the pre-operative holding area. The patient is aware that this procedure may not alleviate her symptoms. That there may be a necessity for a second surgery and that there may be an incomplete removal of abnormal tissue. Patient was counseled on breakdown and need for self cath. She is not sexually active.                    ________________________________________D. O. Date:_______________  Diana Sommers DO        Patient was seen with total face to face time of 15 minutes. More than 50% of this visit was on counseling and education regarding her    Diagnosis Orders   1. [place]-Walker grade 2 cystocele     2. Overflow incontinence     3. Atrophy of vagina     4. CKD (chronic kidney disease) stage 3, GFR 30-59 ml/min ([facility])     5. History of blood transfusion      and her options. She was also counseled on her preventative health maintenance recommendations and follow-up.

## 2020-08-11 NOTE — H&P (VIEW-ONLY)
[id number] [facility]  [facility]; [address]  [place], [address] [place] [address]  [phone] mn [phone] Fax        Lexi Lovett  1950  Primary Care Physician: Shashank Long MD        [address] [place] [address] Street: [facility]      2020  MEDICAID CONSENT COMPLETED: No      HPI: Lexi Lovett is a 79 y.o. female     (Term vaginal)-Not sexually active. She has symptoms of overflow incontinence without symptoms of MICHAEL. She has been DX with a cystocele grade 2.  She was offered surgical correction and pessary placement     Relevant Past History:   Patient Active Problem List    Diagnosis Date Noted    [place]-Walker grade 2-3 cystocele 2020     Priority: High    Overflow incontinence 2020     Priority: High    CKD (chronic kidney disease) stage 3, GFR 30-59 ml/min ([facility])      Priority: Medium    OA (osteoarthritis) 10/28/2013     Priority: Medium    GERD (gastroesophageal reflux disease) 2012     Priority: Medium    Acute pain of left knee 2019    Obesity, Class II, BMI 35-39.9 2018    Pancreatitis 2018    Osteoarthritis     Hypercholesterolemia     Hiatal hernia     Pure hypercholesterolemia 2018    Fibromyalgia 10/28/2013    Health care maintenance 2013         OB History    Para Term  AB Living   1 1 1 0 0 1   SAB TAB Ectopic Molar Multiple Live Births   0 0 0 0 0 1      # Outcome Date GA Lbr Chang/2nd Weight Sex Delivery Anes PTL Lv   1 Term 5    F Vag-Spont   PANDA       Past Medical History:   Diagnosis Date    Anesthesia     woke up crying x1    Anesthesia complication     PATIENT WAKES UP CRYING AFTER ANESTHESIA    Cataract     BILATERAL EYES    CKD (chronic kidney disease) stage 3, GFR 30-59 ml/min ([facility])     Constipation     Fibromyalgia     Frequent stools     GERD (gastroesophageal reflux disease)     Hiatal hernia     High calcium levels     History of blood transfusion  AUTOLOGUS X1 AFTER HYSTERECTOMY    Hypercholesterolemia     OA (osteoarthritis) 10/28/2013    Osteoarthritis     Parathyroid adenoma     PONV (postoperative nausea and vomiting)     Slow urinary stream     Snores     Wears glasses        Past Surgical History:   Procedure Laterality Date    APPENDECTOMY  1964    CARPAL TUNNEL RELEASE Bilateral     CHOLECYSTECTOMY      COLONOSCOPY      1.., 8..    CYST REMOVAL Left     GANGLION REMOVED 2 TIMES    ENDOSCOPY, COLON, DIAGNOSTIC      EGD    HYSTERECTOMY      UTERUS ONLY (GURU)    PARATHYROIDECTOMY  2018    [place] EXPLORE PARATHYROID GLANDS Left 2018    PARATHYROIDECTOMY LOWER, NIM MONITOR performed by Willard Garcia MD at [address] [place] [address],[address] ENDOSCOPY      2005, 2010    UPPER GASTROINTESTINAL ENDOSCOPY         Social History     Socioeconomic History    Marital status:      Spouse name: Not on file    Number of children: Not on file    Years of education: Not on file    Highest education level: Not on file   Occupational History    Not on file   Social Needs    Financial resource strain: Not on file    Food insecurity     Worry: Not on file     Inability: Not on file    Transportation needs     Medical: Not on file     Non-medical: Not on file   Tobacco Use    Smoking status: Former Smoker     Packs/day: 2.00     Years: 20.00     Pack years: 40.00     Types: Cigarettes     Last attempt to quit: 1990     Years since quittin.0    Smokeless tobacco: Never Used   Substance and Sexual Activity    Alcohol use: No    Drug use: No    Sexual activity: Not on file   Lifestyle    Physical activity     Days per week: Not on file     Minutes per session: Not on file    Stress: Not on file   Relationships    Social connections     Talks on phone: Not on file     Gets together: Not on file     Attends U[employer] service: Not on file pain  SKIN: Denies: rash, hives. HEMATOLOGIC: Denies Bleeding Disorder, Coagulopathy ; +Transfusion HX Autologous  NEUROLOGIC: Denies Migraines, Headaches, CVA, TIA  ENDOCRINE: Denies any Endocrinologic disorders                PHYSICAL EXAM:  Blood pressure 122/80, pulse 64, temperature 98 °F (36.7 °C), height 5' 5\" (1.651 m), weight 225 lb (102.1 kg), not currently breastfeeding. General Appearance:  awake, alert, oriented, in no acute distress, well developed, well nourished, in no acute distress   Skin:  Skin color, texture, turgor normal. No rashes or lesions  Mouth/Throat:  Mucosa moist.  No lesions. Pharynx without erythema, edema or exudate. Lungs:  Normal expansion. Clear to auscultation. No rales, rhonchi, or wheezing. Heart:  Heart sounds are normal.  Regular rate and rhythm without murmur, gallop or rub. Breast:  Inspection negative. No nipple discharge or bleeding. No palpable mass  Abdomen:  Soft, non-tender, normal bowel sounds. No bruits, organomegaly or masses. Extremities: Extremities warm to touch, pink, with no edema. Musculoskeletal:  negative  Peripheral Pulses:  Normal  Neurologic:  Gait normal. Reflexes normal and symmetric. Sensation grossly intact. Female Urogen: Declined  Female Rectal: Declined    OMM Structural Component:  The patient did not complain of a Chief complaint requiring OMM. Chief Complaint:none    Structural Exam: No Interest              Diagnostics:  No results found.     Lab Results:  Results for orders placed or performed during the hospital encounter of 02/26/20   Factor 12 Assay   Result Value Ref Range    Factor XII Activity 105 35 - 150 %   Factor 9 Assay   Result Value Ref Range    Factor IX Activity 143 50 - 150 %   Factor 8 Assay   Result Value Ref Range    Factor VIII Activity 253 (H) 50 - 150 %   Protime-INR   Result Value Ref Range    Protime 9.9 9.0 - 12.0 sec    INR 0.9    APTT   Result Value Ref Range    PTT 28.3 20.5 - 30.5 sec           The patient was counseled at length about the risks of michelet Covid-19 in the keegan-operative and post-operative states including the recovery window of their procedure. The patient was made aware that michelet Covid-19 after a surgical procedure may worsen their prognosis for recovering from the virus and lend to a higher morbidity and or mortality risk. The patient was given the options of postponing their procedure. All of the risks, benefits, and alternatives were discussed. The patient  does wish to proceed with the procedure. Assessment:     Diagnosis Orders   1. [place]-Walker grade 2 cystocele     2. Overflow incontinence     3. Atrophy of vagina     4. CKD (chronic kidney disease) stage 3, GFR 30-59 ml/min ([facility])     5. History of blood transfusion         Patient Active Problem List    Diagnosis Date Noted    [place]-Walker grade 2-3 cystocele 01/07/2020     Priority: High    Overflow incontinence 01/07/2020     Priority: High    CKD (chronic kidney disease) stage 3, GFR 30-59 ml/min ([facility])      Priority: Medium    OA (osteoarthritis) 10/28/2013     Priority: Medium    GERD (gastroesophageal reflux disease) 07/06/2012     Priority: Medium    Acute pain of left knee 11/20/2019    Obesity, Class II, BMI 35-39.9 09/19/2018    Pancreatitis 09/12/2018    Osteoarthritis     Hypercholesterolemia     Hiatal hernia     Pure hypercholesterolemia 04/13/2018    Fibromyalgia 10/28/2013    Health care maintenance 04/25/2013           Plan:  1. Anterior Colporrhaphy  2. Premarin cream bid 3 weeks pre-op  No orders of the defined types were placed in this encounter. Counseling: The patient was counseled on all options both medical and surgical, conservative as well as definitive. She has elected to proceed with the procedure as stated above. The patient was counseled on the procedure. Risks and complications were reviewed in detail. The patients orders, labs, consents have been completed. The history and physical as well as all supporting surgical documentation will be forwarded to the pre-operative holding area. The patient is aware that this procedure may not alleviate her symptoms. That there may be a necessity for a second surgery and that there may be an incomplete removal of abnormal tissue. Patient was counseled on breakdown and need for self cath. She is not sexually active.                    ________________________________________D. O.  Date:_______________  Brittnee Abbott,

## 2020-08-17 ENCOUNTER — HOSPITAL ENCOUNTER (OUTPATIENT)
Dept: PREADMISSION TESTING | Age: 70
Discharge: HOME | End: 2020-08-21
Payer: MEDICARE

## 2020-08-17 ENCOUNTER — TELEPHONE (OUTPATIENT)
Dept: OBGYN CLINIC | Age: 70
End: 2020-08-17

## 2020-08-17 VITALS
OXYGEN SATURATION: 99 % | BODY MASS INDEX: 37.32 KG/M2 | WEIGHT: 224 LBS | RESPIRATION RATE: 12 BRPM | SYSTOLIC BLOOD PRESSURE: 149 MMHG | HEIGHT: 65 IN | HEART RATE: 54 BPM | TEMPERATURE: 98.5 F | DIASTOLIC BLOOD PRESSURE: 71 MMHG

## 2020-08-17 LAB
-: ABNORMAL
ABO/RH: NORMAL
ABSOLUTE EOS #: 0.2 K/UL (ref 0–0.4)
ABSOLUTE IMMATURE GRANULOCYTE: NORMAL K/UL (ref 0–0.3)
ABSOLUTE LYMPH #: 1.9 K/UL (ref 1–4.8)
ABSOLUTE MONO #: 0.5 K/UL (ref 0.1–1.3)
AMORPHOUS: ABNORMAL
ANION GAP SERPL CALCULATED.3IONS-SCNC: 9 MMOL/L (ref 9–17)
ANTIBODY SCREEN: NEGATIVE
ARM BAND NUMBER: NORMAL
BACTERIA: ABNORMAL
BASOPHILS # BLD: 1 % (ref 0–2)
BASOPHILS ABSOLUTE: 0.1 K/UL (ref 0–0.2)
BILIRUBIN URINE: NEGATIVE
BUN BLDV-MCNC: 16 MG/DL (ref 8–23)
BUN/CREAT BLD: ABNORMAL (ref 9–20)
CALCIUM SERPL-MCNC: 9.5 MG/DL (ref 8.6–10.4)
CASTS UA: ABNORMAL /LPF
CHLORIDE BLD-SCNC: 108 MMOL/L (ref 98–107)
CO2: 23 MMOL/L (ref 20–31)
COLOR: YELLOW
COMMENT UA: ABNORMAL
CREAT SERPL-MCNC: 1.08 MG/DL (ref 0.5–0.9)
CREATININE URINE: 210 MG/DL (ref 28–217)
CRYSTALS, UA: ABNORMAL /HPF
DIFFERENTIAL TYPE: NORMAL
EOSINOPHILS RELATIVE PERCENT: 3 % (ref 0–4)
EPITHELIAL CELLS UA: ABNORMAL /HPF
EXPIRATION DATE: NORMAL
GFR AFRICAN AMERICAN: >60 ML/MIN
GFR NON-AFRICAN AMERICAN: 50 ML/MIN
GFR SERPL CREATININE-BSD FRML MDRD: ABNORMAL ML/MIN/{1.73_M2}
GFR SERPL CREATININE-BSD FRML MDRD: ABNORMAL ML/MIN/{1.73_M2}
GLUCOSE BLD-MCNC: 103 MG/DL (ref 70–99)
GLUCOSE URINE: NEGATIVE
HCT VFR BLD CALC: 40.5 % (ref 36–46)
HEMOGLOBIN: 13.4 G/DL (ref 12–16)
IMMATURE GRANULOCYTES: NORMAL %
KETONES, URINE: NEGATIVE
LEUKOCYTE ESTERASE, URINE: ABNORMAL
LYMPHOCYTES # BLD: 29 % (ref 24–44)
MCH RBC QN AUTO: 30 PG (ref 26–34)
MCHC RBC AUTO-ENTMCNC: 33 G/DL (ref 31–37)
MCV RBC AUTO: 90.9 FL (ref 80–100)
MICROALBUMIN/CREAT 24H UR: 37 MG/L
MICROALBUMIN/CREAT UR-RTO: 18 MCG/MG CREAT
MONOCYTES # BLD: 7 % (ref 1–7)
MUCUS: ABNORMAL
NITRITE, URINE: NEGATIVE
NRBC AUTOMATED: NORMAL PER 100 WBC
OTHER OBSERVATIONS UA: ABNORMAL
PDW BLD-RTO: 13.6 % (ref 11.5–14.9)
PH UA: 6 (ref 5–8)
PLATELET # BLD: 176 K/UL (ref 150–450)
PLATELET ESTIMATE: NORMAL
PMV BLD AUTO: 8.7 FL (ref 6–12)
POTASSIUM SERPL-SCNC: 4.9 MMOL/L (ref 3.7–5.3)
PROTEIN UA: NEGATIVE
RBC # BLD: 4.46 M/UL (ref 4–5.2)
RBC # BLD: NORMAL 10*6/UL
RBC UA: ABNORMAL /HPF
RENAL EPITHELIAL, UA: ABNORMAL /HPF
SEG NEUTROPHILS: 60 % (ref 36–66)
SEGMENTED NEUTROPHILS ABSOLUTE COUNT: 3.9 K/UL (ref 1.3–9.1)
SODIUM BLD-SCNC: 140 MMOL/L (ref 135–144)
SPECIFIC GRAVITY UA: 1.02 (ref 1–1.03)
TRICHOMONAS: ABNORMAL
TURBIDITY: CLEAR
URINE HGB: NEGATIVE
UROBILINOGEN, URINE: NORMAL
WBC # BLD: 6.6 K/UL (ref 3.5–11)
WBC # BLD: NORMAL 10*3/UL
WBC UA: ABNORMAL /HPF
YEAST: ABNORMAL

## 2020-08-17 PROCEDURE — 87086 URINE CULTURE/COLONY COUNT: CPT

## 2020-08-17 PROCEDURE — 86901 BLOOD TYPING SEROLOGIC RH(D): CPT

## 2020-08-17 PROCEDURE — 86850 RBC ANTIBODY SCREEN: CPT

## 2020-08-17 PROCEDURE — 85025 COMPLETE CBC W/AUTO DIFF WBC: CPT

## 2020-08-17 PROCEDURE — 36415 COLL VENOUS BLD VENIPUNCTURE: CPT

## 2020-08-17 PROCEDURE — 80048 BASIC METABOLIC PNL TOTAL CA: CPT

## 2020-08-17 PROCEDURE — 81001 URINALYSIS AUTO W/SCOPE: CPT

## 2020-08-17 PROCEDURE — 82570 ASSAY OF URINE CREATININE: CPT

## 2020-08-17 PROCEDURE — 86900 BLOOD TYPING SEROLOGIC ABO: CPT

## 2020-08-17 PROCEDURE — 82043 UR ALBUMIN QUANTITATIVE: CPT

## 2020-08-17 NOTE — TELEPHONE ENCOUNTER
Per Kiran Neely CNP, patient advised to follow up with PCP for elevated creatinine level and urology for low GFR. Patient voiced that she follows up with nephrology in November and her PCP in October. Patient's lab completed as pre op testing. Will inform Dr. Kurt Gamez to determine if clearance is needed.

## 2020-08-17 NOTE — TELEPHONE ENCOUNTER
----- Message from TOI Carbajal NP sent at 8/17/2020 11:56 AM EDT -----  Refer to urology for abnormal GFR

## 2020-08-18 ENCOUNTER — ANESTHESIA EVENT (OUTPATIENT)
Dept: OPERATING ROOM | Age: 70
End: 2020-08-18
Payer: MEDICARE

## 2020-08-18 LAB
CULTURE: NORMAL
Lab: NORMAL
SPECIMEN DESCRIPTION: NORMAL

## 2020-08-18 RX ORDER — SODIUM CHLORIDE, SODIUM LACTATE, POTASSIUM CHLORIDE, CALCIUM CHLORIDE 600; 310; 30; 20 MG/100ML; MG/100ML; MG/100ML; MG/100ML
INJECTION, SOLUTION INTRAVENOUS CONTINUOUS
Status: CANCELLED | OUTPATIENT
Start: 2020-08-18

## 2020-08-18 RX ORDER — SODIUM CHLORIDE 0.9 % (FLUSH) 0.9 %
10 SYRINGE (ML) INJECTION PRN
Status: CANCELLED | OUTPATIENT
Start: 2020-08-18

## 2020-08-18 RX ORDER — LIDOCAINE HYDROCHLORIDE 10 MG/ML
1 INJECTION, SOLUTION EPIDURAL; INFILTRATION; INTRACAUDAL; PERINEURAL
Status: CANCELLED | OUTPATIENT
Start: 2020-08-18 | End: 2020-08-18

## 2020-08-18 RX ORDER — SODIUM CHLORIDE 0.9 % (FLUSH) 0.9 %
10 SYRINGE (ML) INJECTION EVERY 12 HOURS SCHEDULED
Status: CANCELLED | OUTPATIENT
Start: 2020-08-18

## 2020-08-20 ENCOUNTER — HOSPITAL ENCOUNTER (OUTPATIENT)
Dept: PREADMISSION TESTING | Age: 70
Setting detail: SPECIMEN
Discharge: HOME | End: 2020-08-24
Payer: MEDICARE

## 2020-08-20 PROCEDURE — U0003 INFECTIOUS AGENT DETECTION BY NUCLEIC ACID (DNA OR RNA); SEVERE ACUTE RESPIRATORY SYNDROME CORONAVIRUS 2 (SARS-COV-2) (CORONAVIRUS DISEASE [COVID-19]), AMPLIFIED PROBE TECHNIQUE, MAKING USE OF HIGH THROUGHPUT TECHNOLOGIES AS DESCRIBED BY CMS-2020-01-R: HCPCS

## 2020-08-22 LAB — SARS-COV-2, NAA: NOT DETECTED

## 2020-08-24 ENCOUNTER — ANESTHESIA (OUTPATIENT)
Dept: OPERATING ROOM | Age: 70
End: 2020-08-24
Payer: MEDICARE

## 2020-08-24 ENCOUNTER — HOSPITAL ENCOUNTER (OUTPATIENT)
Age: 70
Setting detail: OBSERVATION
Discharge: HOME | End: 2020-08-26
Attending: OBSTETRICS & GYNECOLOGY | Admitting: OBSTETRICS & GYNECOLOGY
Payer: MEDICARE

## 2020-08-24 VITALS — OXYGEN SATURATION: 100 % | TEMPERATURE: 96.8 F | DIASTOLIC BLOOD PRESSURE: 69 MMHG | SYSTOLIC BLOOD PRESSURE: 123 MMHG

## 2020-08-24 PROBLEM — Z98.890 POST-OPERATIVE STATE: Status: ACTIVE | Noted: 2020-08-24

## 2020-08-24 PROBLEM — N81.10 BADEN-WALKER GRADE 2 CYSTOCELE: Status: ACTIVE | Noted: 2020-08-24

## 2020-08-24 LAB
-: ABNORMAL
AMORPHOUS: ABNORMAL
BACTERIA: ABNORMAL
BILIRUBIN URINE: ABNORMAL
CASTS UA: ABNORMAL /LPF
COLOR: YELLOW
COMMENT UA: ABNORMAL
CRYSTALS, UA: ABNORMAL /HPF
EPITHELIAL CELLS UA: ABNORMAL /HPF
GLUCOSE URINE: NEGATIVE
KETONES, URINE: ABNORMAL
LEUKOCYTE ESTERASE, URINE: NEGATIVE
MUCUS: ABNORMAL
NITRITE, URINE: NEGATIVE
OTHER OBSERVATIONS UA: ABNORMAL
PH UA: 6 (ref 5–8)
PROTEIN UA: ABNORMAL
RBC UA: ABNORMAL /HPF
RENAL EPITHELIAL, UA: ABNORMAL /HPF
SPECIFIC GRAVITY UA: 1.02 (ref 1–1.03)
TRICHOMONAS: ABNORMAL
TURBIDITY: CLEAR
URINE HGB: NEGATIVE
UROBILINOGEN, URINE: NORMAL
WBC UA: ABNORMAL /HPF
YEAST: ABNORMAL

## 2020-08-24 PROCEDURE — 96365 THER/PROPH/DIAG IV INF INIT: CPT

## 2020-08-24 PROCEDURE — 6370000000 HC RX 637 (ALT 250 FOR IP): Performed by: OBSTETRICS & GYNECOLOGY

## 2020-08-24 PROCEDURE — 6360000002 HC RX W HCPCS: Performed by: NURSE ANESTHETIST, CERTIFIED REGISTERED

## 2020-08-24 PROCEDURE — 3700000001 HC ADD 15 MINUTES (ANESTHESIA): Performed by: OBSTETRICS & GYNECOLOGY

## 2020-08-24 PROCEDURE — 3700000000 HC ANESTHESIA ATTENDED CARE: Performed by: OBSTETRICS & GYNECOLOGY

## 2020-08-24 PROCEDURE — 7100000000 HC PACU RECOVERY - FIRST 15 MIN: Performed by: OBSTETRICS & GYNECOLOGY

## 2020-08-24 PROCEDURE — 2580000003 HC RX 258: Performed by: OBSTETRICS & GYNECOLOGY

## 2020-08-24 PROCEDURE — 6370000000 HC RX 637 (ALT 250 FOR IP)

## 2020-08-24 PROCEDURE — G0378 HOSPITAL OBSERVATION PER HR: HCPCS

## 2020-08-24 PROCEDURE — 3600000002 HC SURGERY LEVEL 2 BASE: Performed by: OBSTETRICS & GYNECOLOGY

## 2020-08-24 PROCEDURE — 6360000002 HC RX W HCPCS: Performed by: OBSTETRICS & GYNECOLOGY

## 2020-08-24 PROCEDURE — 3600000012 HC SURGERY LEVEL 2 ADDTL 15MIN: Performed by: OBSTETRICS & GYNECOLOGY

## 2020-08-24 PROCEDURE — 2580000003 HC RX 258: Performed by: ANESTHESIOLOGY

## 2020-08-24 PROCEDURE — 57240 ANTERIOR COLPORRHAPHY: CPT | Performed by: OBSTETRICS & GYNECOLOGY

## 2020-08-24 PROCEDURE — 96372 THER/PROPH/DIAG INJ SC/IM: CPT

## 2020-08-24 PROCEDURE — 6360000002 HC RX W HCPCS: Performed by: STUDENT IN AN ORGANIZED HEALTH CARE EDUCATION/TRAINING PROGRAM

## 2020-08-24 PROCEDURE — 2580000003 HC RX 258: Performed by: STUDENT IN AN ORGANIZED HEALTH CARE EDUCATION/TRAINING PROGRAM

## 2020-08-24 PROCEDURE — 7100000001 HC PACU RECOVERY - ADDTL 15 MIN: Performed by: OBSTETRICS & GYNECOLOGY

## 2020-08-24 PROCEDURE — 1200000000 HC SEMI PRIVATE

## 2020-08-24 PROCEDURE — 88302 TISSUE EXAM BY PATHOLOGIST: CPT

## 2020-08-24 PROCEDURE — 2500000003 HC RX 250 WO HCPCS: Performed by: NURSE ANESTHETIST, CERTIFIED REGISTERED

## 2020-08-24 PROCEDURE — 6370000000 HC RX 637 (ALT 250 FOR IP): Performed by: STUDENT IN AN ORGANIZED HEALTH CARE EDUCATION/TRAINING PROGRAM

## 2020-08-24 PROCEDURE — 81001 URINALYSIS AUTO W/SCOPE: CPT

## 2020-08-24 PROCEDURE — 2709999900 HC NON-CHARGEABLE SUPPLY: Performed by: OBSTETRICS & GYNECOLOGY

## 2020-08-24 RX ORDER — HYDROCODONE BITARTRATE AND ACETAMINOPHEN 5; 325 MG/1; MG/1
1 TABLET ORAL EVERY 6 HOURS PRN
Qty: 12 TABLET | Refills: 0 | Status: SHIPPED | OUTPATIENT
Start: 2020-08-24 | End: 2020-08-27

## 2020-08-24 RX ORDER — ONDANSETRON 4 MG/1
4 TABLET, FILM COATED ORAL EVERY 8 HOURS PRN
Status: DISCONTINUED | OUTPATIENT
Start: 2020-08-24 | End: 2020-08-26 | Stop reason: HOSPADM

## 2020-08-24 RX ORDER — DEXAMETHASONE SODIUM PHOSPHATE 4 MG/ML
INJECTION, SOLUTION INTRA-ARTICULAR; INTRALESIONAL; INTRAMUSCULAR; INTRAVENOUS; SOFT TISSUE PRN
Status: DISCONTINUED | OUTPATIENT
Start: 2020-08-24 | End: 2020-08-24 | Stop reason: SDUPTHER

## 2020-08-24 RX ORDER — DOCUSATE SODIUM 100 MG/1
100 CAPSULE, LIQUID FILLED ORAL 2 TIMES DAILY PRN
Qty: 30 CAPSULE | Refills: 0 | Status: SHIPPED | OUTPATIENT
Start: 2020-08-24 | End: 2020-09-23

## 2020-08-24 RX ORDER — SODIUM CHLORIDE, SODIUM LACTATE, POTASSIUM CHLORIDE, CALCIUM CHLORIDE 600; 310; 30; 20 MG/100ML; MG/100ML; MG/100ML; MG/100ML
INJECTION, SOLUTION INTRAVENOUS CONTINUOUS
Status: DISCONTINUED | OUTPATIENT
Start: 2020-08-24 | End: 2020-08-24

## 2020-08-24 RX ORDER — FENTANYL CITRATE 50 UG/ML
INJECTION, SOLUTION INTRAMUSCULAR; INTRAVENOUS PRN
Status: DISCONTINUED | OUTPATIENT
Start: 2020-08-24 | End: 2020-08-24 | Stop reason: SDUPTHER

## 2020-08-24 RX ORDER — MIDAZOLAM HYDROCHLORIDE 1 MG/ML
INJECTION INTRAMUSCULAR; INTRAVENOUS PRN
Status: DISCONTINUED | OUTPATIENT
Start: 2020-08-24 | End: 2020-08-24 | Stop reason: SDUPTHER

## 2020-08-24 RX ORDER — SODIUM CHLORIDE 9 MG/ML
INJECTION, SOLUTION INTRAVENOUS CONTINUOUS
Status: DISCONTINUED | OUTPATIENT
Start: 2020-08-24 | End: 2020-08-25

## 2020-08-24 RX ORDER — ACETAMINOPHEN 500 MG
1000 TABLET ORAL EVERY 6 HOURS PRN
Status: DISCONTINUED | OUTPATIENT
Start: 2020-08-24 | End: 2020-08-26 | Stop reason: HOSPADM

## 2020-08-24 RX ORDER — DIPHENHYDRAMINE HYDROCHLORIDE 50 MG/ML
25 INJECTION INTRAMUSCULAR; INTRAVENOUS EVERY 6 HOURS PRN
Status: DISCONTINUED | OUTPATIENT
Start: 2020-08-24 | End: 2020-08-26 | Stop reason: HOSPADM

## 2020-08-24 RX ORDER — DOCUSATE SODIUM 100 MG/1
100 CAPSULE, LIQUID FILLED ORAL 2 TIMES DAILY
Status: DISCONTINUED | OUTPATIENT
Start: 2020-08-24 | End: 2020-08-24

## 2020-08-24 RX ORDER — ONDANSETRON 2 MG/ML
4 INJECTION INTRAMUSCULAR; INTRAVENOUS EVERY 6 HOURS PRN
Status: DISCONTINUED | OUTPATIENT
Start: 2020-08-24 | End: 2020-08-26 | Stop reason: HOSPADM

## 2020-08-24 RX ORDER — HYDROCODONE BITARTRATE AND ACETAMINOPHEN 5; 325 MG/1; MG/1
2 TABLET ORAL EVERY 4 HOURS PRN
Status: DISCONTINUED | OUTPATIENT
Start: 2020-08-24 | End: 2020-08-26 | Stop reason: HOSPADM

## 2020-08-24 RX ORDER — CIPROFLOXACIN 500 MG/1
500 TABLET, FILM COATED ORAL 2 TIMES DAILY
Qty: 10 TABLET | Refills: 0 | Status: SHIPPED | OUTPATIENT
Start: 2020-08-24 | End: 2020-08-29

## 2020-08-24 RX ORDER — SODIUM CHLORIDE 0.9 % (FLUSH) 0.9 %
10 SYRINGE (ML) INJECTION EVERY 12 HOURS SCHEDULED
Status: DISCONTINUED | OUTPATIENT
Start: 2020-08-24 | End: 2020-08-24 | Stop reason: HOSPADM

## 2020-08-24 RX ORDER — PROPOFOL 10 MG/ML
INJECTION, EMULSION INTRAVENOUS PRN
Status: DISCONTINUED | OUTPATIENT
Start: 2020-08-24 | End: 2020-08-24 | Stop reason: SDUPTHER

## 2020-08-24 RX ORDER — HEPARIN SODIUM 5000 [USP'U]/ML
5000 INJECTION, SOLUTION INTRAVENOUS; SUBCUTANEOUS EVERY 12 HOURS
Status: DISCONTINUED | OUTPATIENT
Start: 2020-08-24 | End: 2020-08-26 | Stop reason: HOSPADM

## 2020-08-24 RX ORDER — METOCLOPRAMIDE HYDROCHLORIDE 5 MG/ML
INJECTION INTRAMUSCULAR; INTRAVENOUS PRN
Status: DISCONTINUED | OUTPATIENT
Start: 2020-08-24 | End: 2020-08-24 | Stop reason: SDUPTHER

## 2020-08-24 RX ORDER — DOCUSATE SODIUM 100 MG/1
100 CAPSULE, LIQUID FILLED ORAL DAILY PRN
Status: DISCONTINUED | OUTPATIENT
Start: 2020-08-24 | End: 2020-08-26 | Stop reason: HOSPADM

## 2020-08-24 RX ORDER — ONDANSETRON 2 MG/ML
INJECTION INTRAMUSCULAR; INTRAVENOUS PRN
Status: DISCONTINUED | OUTPATIENT
Start: 2020-08-24 | End: 2020-08-24 | Stop reason: SDUPTHER

## 2020-08-24 RX ORDER — LIDOCAINE HYDROCHLORIDE 10 MG/ML
INJECTION, SOLUTION EPIDURAL; INFILTRATION; INTRACAUDAL; PERINEURAL PRN
Status: DISCONTINUED | OUTPATIENT
Start: 2020-08-24 | End: 2020-08-24 | Stop reason: SDUPTHER

## 2020-08-24 RX ORDER — GLYCOPYRROLATE 1 MG/5 ML
SYRINGE (ML) INTRAVENOUS PRN
Status: DISCONTINUED | OUTPATIENT
Start: 2020-08-24 | End: 2020-08-24 | Stop reason: SDUPTHER

## 2020-08-24 RX ORDER — SODIUM CHLORIDE 0.9 % (FLUSH) 0.9 %
10 SYRINGE (ML) INJECTION PRN
Status: DISCONTINUED | OUTPATIENT
Start: 2020-08-24 | End: 2020-08-24 | Stop reason: HOSPADM

## 2020-08-24 RX ORDER — HYDROCODONE BITARTRATE AND ACETAMINOPHEN 5; 325 MG/1; MG/1
1 TABLET ORAL EVERY 4 HOURS PRN
Status: DISCONTINUED | OUTPATIENT
Start: 2020-08-24 | End: 2020-08-26 | Stop reason: HOSPADM

## 2020-08-24 RX ORDER — CALCIUM CARBONATE 200(500)MG
500 TABLET,CHEWABLE ORAL 3 TIMES DAILY PRN
Status: DISCONTINUED | OUTPATIENT
Start: 2020-08-24 | End: 2020-08-26 | Stop reason: HOSPADM

## 2020-08-24 RX ORDER — SCOLOPAMINE TRANSDERMAL SYSTEM 1 MG/1
PATCH, EXTENDED RELEASE TRANSDERMAL
Status: COMPLETED
Start: 2020-08-24 | End: 2020-08-24

## 2020-08-24 RX ORDER — LIDOCAINE HYDROCHLORIDE 10 MG/ML
1 INJECTION, SOLUTION EPIDURAL; INFILTRATION; INTRACAUDAL; PERINEURAL
Status: DISCONTINUED | OUTPATIENT
Start: 2020-08-24 | End: 2020-08-24 | Stop reason: HOSPADM

## 2020-08-24 RX ADMIN — LIDOCAINE HYDROCHLORIDE 50 MG: 10 INJECTION, SOLUTION EPIDURAL; INFILTRATION; INTRACAUDAL; PERINEURAL at 08:37

## 2020-08-24 RX ADMIN — ONDANSETRON 4 MG: 2 INJECTION INTRAMUSCULAR; INTRAVENOUS at 08:50

## 2020-08-24 RX ADMIN — SODIUM CHLORIDE, POTASSIUM CHLORIDE, SODIUM LACTATE AND CALCIUM CHLORIDE: 600; 310; 30; 20 INJECTION, SOLUTION INTRAVENOUS at 08:12

## 2020-08-24 RX ADMIN — SODIUM CHLORIDE: 9 INJECTION, SOLUTION INTRAVENOUS at 11:05

## 2020-08-24 RX ADMIN — Medication 10 ML: at 11:06

## 2020-08-24 RX ADMIN — METOCLOPRAMIDE 5 MG: 5 INJECTION, SOLUTION INTRAMUSCULAR; INTRAVENOUS at 08:30

## 2020-08-24 RX ADMIN — Medication 10 ML: at 11:07

## 2020-08-24 RX ADMIN — HEPARIN SODIUM 5000 UNITS: 5000 INJECTION INTRAVENOUS; SUBCUTANEOUS at 21:11

## 2020-08-24 RX ADMIN — ACETAMINOPHEN 1000 MG: 500 TABLET, FILM COATED ORAL at 22:13

## 2020-08-24 RX ADMIN — MIDAZOLAM 1 MG: 1 INJECTION INTRAMUSCULAR; INTRAVENOUS at 08:28

## 2020-08-24 RX ADMIN — DOCUSATE SODIUM 100 MG: 100 CAPSULE, LIQUID FILLED ORAL at 11:08

## 2020-08-24 RX ADMIN — FENTANYL CITRATE 25 MCG: 50 INJECTION, SOLUTION INTRAMUSCULAR; INTRAVENOUS at 08:57

## 2020-08-24 RX ADMIN — Medication 1 LOZENGE: at 13:46

## 2020-08-24 RX ADMIN — PROPOFOL 160 MG: 10 INJECTION, EMULSION INTRAVENOUS at 08:37

## 2020-08-24 RX ADMIN — SODIUM CHLORIDE: 9 INJECTION, SOLUTION INTRAVENOUS at 23:48

## 2020-08-24 RX ADMIN — FENTANYL CITRATE 25 MCG: 50 INJECTION, SOLUTION INTRAMUSCULAR; INTRAVENOUS at 08:50

## 2020-08-24 RX ADMIN — SODIUM CHLORIDE, POTASSIUM CHLORIDE, SODIUM LACTATE AND CALCIUM CHLORIDE: 600; 310; 30; 20 INJECTION, SOLUTION INTRAVENOUS at 08:11

## 2020-08-24 RX ADMIN — Medication 0.3 MG: at 08:59

## 2020-08-24 RX ADMIN — ACETAMINOPHEN 1000 MG: 500 TABLET, FILM COATED ORAL at 11:05

## 2020-08-24 RX ADMIN — CEFAZOLIN 2 G: 10 INJECTION, POWDER, FOR SOLUTION INTRAVENOUS at 08:45

## 2020-08-24 RX ADMIN — CEFAZOLIN 2 G: 1 INJECTION, POWDER, FOR SOLUTION INTRAMUSCULAR; INTRAVENOUS at 18:05

## 2020-08-24 RX ADMIN — DEXAMETHASONE SODIUM PHOSPHATE 8 MG: 4 INJECTION, SOLUTION INTRA-ARTICULAR; INTRALESIONAL; INTRAMUSCULAR; INTRAVENOUS; SOFT TISSUE at 08:30

## 2020-08-24 ASSESSMENT — PULMONARY FUNCTION TESTS
PIF_VALUE: 1
PIF_VALUE: 17
PIF_VALUE: 0
PIF_VALUE: 18
PIF_VALUE: 1
PIF_VALUE: 17
PIF_VALUE: 17
PIF_VALUE: 14
PIF_VALUE: 1
PIF_VALUE: 17
PIF_VALUE: 18
PIF_VALUE: 17
PIF_VALUE: 18
PIF_VALUE: 3
PIF_VALUE: 17
PIF_VALUE: 25
PIF_VALUE: 17
PIF_VALUE: 18
PIF_VALUE: 0
PIF_VALUE: 17
PIF_VALUE: 12
PIF_VALUE: 17
PIF_VALUE: 1
PIF_VALUE: 18
PIF_VALUE: 18
PIF_VALUE: 17
PIF_VALUE: 0
PIF_VALUE: 18
PIF_VALUE: 13
PIF_VALUE: 17
PIF_VALUE: 18
PIF_VALUE: 17
PIF_VALUE: 17
PIF_VALUE: 0
PIF_VALUE: 17
PIF_VALUE: 0
PIF_VALUE: 18
PIF_VALUE: 1
PIF_VALUE: 0
PIF_VALUE: 17
PIF_VALUE: 18
PIF_VALUE: 12
PIF_VALUE: 17
PIF_VALUE: 17
PIF_VALUE: 25

## 2020-08-24 ASSESSMENT — PAIN DESCRIPTION - DESCRIPTORS: DESCRIPTORS: THROBBING

## 2020-08-24 ASSESSMENT — PAIN SCALES - GENERAL
PAINLEVEL_OUTOF10: 0
PAINLEVEL_OUTOF10: 4
PAINLEVEL_OUTOF10: 0
PAINLEVEL_OUTOF10: 5
PAINLEVEL_OUTOF10: 0

## 2020-08-24 ASSESSMENT — PAIN - FUNCTIONAL ASSESSMENT: PAIN_FUNCTIONAL_ASSESSMENT: 0-10

## 2020-08-24 ASSESSMENT — PAIN DESCRIPTION - PAIN TYPE: TYPE: SURGICAL PAIN

## 2020-08-24 ASSESSMENT — PAIN DESCRIPTION - LOCATION: LOCATION: VAGINA

## 2020-08-24 NOTE — INTERVAL H&P NOTE
Update History & Physical- (Gynecology)     [facility]  Gynecologic Surgery  PHYSICIAN PRE-OPERATIVE NOTE:      Patient Name: Lexi Lovett  Patient : 1950  Room/Bed: [address] [place] [address] [address] Pool/NONE  Admission Date/Time: 2020  6:23 AM  Primary Care Physician: Shashank Long MD  MRN #: [id number]  [facility] #: [id number]        Date: 2020  Time: 7:00 AM        The patient's History and Physical was reviewed with the patient and there were no significant changes. I examined the patient and there were no significant changes from the previous History and Physical.    Plan: The risk, benefits, expected outcome, and alternative to the recommended procedure have been discussed with the patient. Patient understands and wants to proceed with the procedure. She is aware that there may be a need for a second procedure and there may be incomplete removal of any abnormal tissue. She was also counseled on the possibility of Bleeding, Infection, possible damage to bowel, bladder,  ureters, vasculature and surrounding organs-(described in layman's terminology to the patient). The labs and consent were reviewed prior to the patient going to the Operating Room. There were no vitals filed for this visit.     PE-per resident note-Unchanged    Patient Active Problem List    Diagnosis Date Noted    [place]-Walker grade 2-3 cystocele 2020     Priority: High    Overflow incontinence 2020     Priority: High    Acute pain of left knee 2019    Obesity, Class II, BMI 35-39.9 2018    Pancreatitis 2018    Osteoarthritis     Hypercholesterolemia     Hiatal hernia     CKD (chronic kidney disease) stage 3, GFR 30-59 ml/min ([facility])     Pure hypercholesterolemia 2018    OA (osteoarthritis) 10/28/2013    Fibromyalgia 10/28/2013    Health care maintenance 2013    GERD (gastroesophageal reflux disease) 2012         Lab Results:  Hospital Outpatient Visit on 2020 Component Date Value Ref Range Status    Expiration Date 08/17/2020 08/27/2020,2359   Final    Arm Band Number 08/17/2020 [id number]   Final    ABO/Rh 08/17/2020 A POSITIVE   Final    Antibody Screen 08/17/2020 NEGATIVE   Final    WBC 08/17/2020 6.6  3.5 - 11.0 k/uL Final    RBC 08/17/2020 4.46  4.0 - 5.2 m/uL Final    Hemoglobin 08/17/2020 13.4  12.0 - 16.0 g/dL Final    Hematocrit 08/17/2020 40.5  36 - 46 % Final    MCV 08/17/2020 90.9  80 - 100 fL Final    MCH 08/17/2020 30.0  26 - 34 pg Final    MCHC 08/17/2020 33.0  31 - 37 g/dL Final    RDW 08/17/2020 13.6  11.5 - 14.9 % Final    Platelets 10/12/7185 176  150 - 450 k/uL Final    MPV 08/17/2020 8.7  6.0 - 12.0 fL Final    NRBC Automated 08/17/2020 NOT REPORTED  per 100 WBC Final    Differential Type 08/17/2020 NOT REPORTED   Final    Seg Neutrophils 08/17/2020 60  36 - 66 % Final    Lymphocytes 08/17/2020 29  24 - 44 % Final    Monocytes 08/17/2020 7  1 - 7 % Final    Eosinophils % 08/17/2020 3  0 - 4 % Final    Basophils 08/17/2020 1  0 - 2 % Final    Immature Granulocytes 08/17/2020 NOT REPORTED  0 % Final    Segs Absolute 08/17/2020 3.90  1.3 - 9.1 k/uL Final    Absolute Lymph # 08/17/2020 1.90  1.0 - 4.8 k/uL Final    Absolute Mono # 08/17/2020 0.50  0.1 - 1.3 k/uL Final    Absolute Eos # 08/17/2020 0.20  0.0 - 0.4 k/uL Final    Basophils Absolute 08/17/2020 0.10  0.0 - 0.2 k/uL Final    Absolute Immature Granulocyte 08/17/2020 NOT REPORTED  0.00 - 0.30 k/uL Final    WBC Morphology 08/17/2020 NOT REPORTED   Final    RBC Morphology 08/17/2020 NOT REPORTED   Final    Platelet Estimate 38/08/8497 NOT REPORTED   Final    Glucose 08/17/2020 103* 70 - 99 mg/dL Final    BUN 08/17/2020 16  8 - 23 mg/dL Final    CREATININE 08/17/2020 1.08* 0.50 - 0.90 mg/dL Final    Bun/Cre Ratio 08/17/2020 NOT REPORTED  9 - 20 Final    Calcium 08/17/2020 9.5  8.6 - 10.4 mg/dL Final    Sodium 08/17/2020 140  135 - 144 mmol/L Final    Potassium 08/17/2020 4.9  3.7 - 5.3 mmol/L Final    SPECIMEN SLIGHTLY HEMOLYZED, RESULTS MAY BE ADVERSELY AFFECTED.  Chloride 08/17/2020 108* 98 - 107 mmol/L Final    CO2 08/17/2020 23  20 - 31 mmol/L Final    Anion Gap 08/17/2020 9  9 - 17 mmol/L Final    GFR Non- 08/17/2020 50* >60 mL/min Final    GFR  08/17/2020 >60  >60 mL/min Final    GFR Comment 08/17/2020        Final    Comment: Average GFR for 79or more years old:   76 mL/min/1.73sq m  Chronic Kidney Disease:   <60 mL/min/1.73sq m  Kidney failure:   <15 mL/min/1.73sq m              eGFR calculated using average adult body mass. Additional eGFR calculator available at:        [employer]            GFR Staging 08/17/2020 NOT REPORTED   Final    Specimen Description 08/17/2020 . CLEAN CATCH URINE   Final    Special Requests 08/17/2020 NOT REPORTED   Final    Culture 08/17/2020 NO SIGNIFICANT GROWTH   Final    Color, UA 08/17/2020 YELLOW  YELLOW Final    Turbidity UA 08/17/2020 CLEAR  CLEAR Final    Glucose, Ur 08/17/2020 NEGATIVE  NEGATIVE Final    Bilirubin Urine 08/17/2020 NEGATIVE  NEGATIVE Final    Ketones, Urine 08/17/2020 NEGATIVE  NEGATIVE Final    Specific [place], UA 08/17/2020 1.024  1.000 - 1.030 Final    Urine Hgb 08/17/2020 NEGATIVE  NEGATIVE Final    pH, UA 08/17/2020 6.0  5.0 - 8.0 Final    Protein, UA 08/17/2020 NEGATIVE  NEGATIVE Final    Urobilinogen, Urine 08/17/2020 Normal  Normal Final    Nitrite, Urine 08/17/2020 NEGATIVE  NEGATIVE Final    Leukocyte Esterase, Urine 08/17/2020 SMALL* NEGATIVE Final    Urinalysis Comments 08/17/2020 NOT REPORTED   Final    Microalb, Ur 08/17/2020 37* <21 mg/L Final    Creatinine, Ur 08/17/2020 210.0  28.0 - 217.0 mg/dL Final    Microalb/Crt.  Ratio 08/17/2020 18  <25 mcg/mg creat Final    - 08/17/2020        Final    WBC, UA 08/17/2020 10 TO 20  /HPF Final    RBC, UA 08/17/2020 0 TO 2  /HPF Final    Casts UA 08/17/2020 NOT REPORTED  /LPF Final    Crystals, UA 08/17/2020 NOT REPORTED  None /HPF Final    Epithelial Cells UA 08/17/2020 5 TO 10  /HPF Final    Renal Epithelial, UA 08/17/2020 NOT REPORTED  0 /HPF Final    Bacteria, UA 08/17/2020 FEW* None Final    Mucus, UA 08/17/2020 NOT REPORTED  None Final    Trichomonas, UA 08/17/2020 NOT REPORTED  None Final    Amorphous, UA 08/17/2020 NOT REPORTED  None Final    Other Observations UA 08/17/2020 NOT REPORTED  NOT REQ. Final    Yeast, UA 08/17/2020 NOT REPORTED  None Final   Hospital Outpatient Visit on 08/20/2020   Component Date Value Ref Range Status    SARS-CoV-2, ANI 08/20/2020 Not Detected  Not Detected Final    Comment: (NOTE)  This test was developed and its performance characteristics  determined by [facility]. This test has not been FDA  cleared or approved. This test has been authorized by FDA under an  Emergency Use Authorization (EUA). This test is only authorized for  the duration of time the declaration that circumstances exist  justifying the authorization of the emergency use of in vitro  diagnostic tests for detection of SARS-CoV-2 virus and/or diagnosis  of COVID-19 infection under section 564(b)(1) of the Act, 21 U. S.C.  974CVE-4(X)(8), unless the authorization is terminated or revoked  sooner. When diagnostic testing is negative, the possibility of a  false negative result should be considered in the context of a  patient's recent exposures and the presence of clinical signs and  symptoms consistent with COVID-19. An individual without symptoms of  COVID-19 and who is not shedding SARS-CoV-2 virus would expect to  have a negative (not detected) result in this assay. Performed                            At: [address] [id number]  Kristal Link MD EM:[id number]         Assessment:  1. [place]-Walker grade 2 cystocele     2. Overflow incontinence     3. Atrophy of vagina     4.

## 2020-08-24 NOTE — ANESTHESIA POSTPROCEDURE EVALUATION
Department of Anesthesiology  Postprocedure Note    Patient: Gurjit Morales  MRN: [id number]  YOB: 1950  Date of evaluation: 8/24/2020  Time:  11:14 AM     Procedure Summary     Date:  08/24/20 Room / Location:  [address] [place] [address] / [address] [place] [address]    Anesthesia Start:  0830 Anesthesia Stop:  0930    Procedure:  VAGINAL CYSTOCELE REPAIR (N/A Vagina ) Diagnosis:  (CYSTOCELE)    Surgeon:  Nixon Noel DO Responsible Provider:  Antoinette Cross MD    Anesthesia Type:  general ASA Status:  3          Anesthesia Type: general    Rich Phase I: Rich Score: 10    Rich Phase II:      Last vitals: Reviewed and per EMR flowsheets.        Anesthesia Post Evaluation    Comments: POST- ANESTHESIA EVALUATION       Pt Name: Gurjit Morales  MRN: [id number]  YOB: 1950  Date of evaluation: 8/24/2020  Time:  11:14 AM      BP (!) 135/57   Pulse 62   Temp 97.6 °F (36.4 °C) (Oral)   Resp 16   Ht 5' 5\" (1.651 m)   Wt 224 lb (101.6 kg)   SpO2 99%   BMI 37.28 kg/m²      Consciousness Level  Awake  Cardiopulmonary Status  Stable  Pain Adequately Treated YES  Nausea / Vomiting  NO  Adequate Hydration  YES  Anesthesia Related Complications NONE      Electronically signed by Antoinette Cross MD on 8/24/2020 at 11:14 AM

## 2020-08-24 NOTE — PROGRESS NOTES
Musculoskeletal: no gross abnormalities, range of motion appropriate for age   Psychiatric: mood appropriate, normal affect   Pelvic Exam: vaginal packing in place with no evidence of vaginal bleeding at this time. Admission on 08/24/2020   Component Date Value Ref Range Status    Color, UA 08/24/2020 YELLOW  YELLOW Final    Turbidity UA 08/24/2020 CLEAR  CLEAR Final    Glucose, Ur 08/24/2020 NEGATIVE  NEGATIVE Final    Bilirubin Urine 08/24/2020 Presumptive positive. Unable to confirm due to unavailability of reagent. * NEGATIVE Corrected    CORRECTED ON 08/24 AT 1151: PREVIOUSLY REPORTED AS SMALL AMOUNT    Ketones, Urine 08/24/2020 SMALL* NEGATIVE Final    Specific Gravity, UA 08/24/2020 1.020  1.000 - 1.030 Final    Urine Hgb 08/24/2020 NEGATIVE  NEGATIVE Final    pH, UA 08/24/2020 6.0  5.0 - 8.0 Final    Protein, UA 08/24/2020 TRACE* NEGATIVE Final    Urobilinogen, Urine 08/24/2020 Normal  Normal Final    Nitrite, Urine 08/24/2020 NEGATIVE  NEGATIVE Final    Leukocyte Esterase, Urine 08/24/2020 NEGATIVE  NEGATIVE Final    Urinalysis Comments 08/24/2020 NOT REPORTED   Final    - 08/24/2020        Final    WBC, UA 08/24/2020 2 TO 5  /HPF Final    RBC, UA 08/24/2020 0 TO 2  /HPF Final    Casts UA 08/24/2020 NOT REPORTED  /LPF Final    Crystals, UA 08/24/2020 NOT REPORTED  None /HPF Final    Epithelial Cells UA 08/24/2020 2 TO 5  /HPF Final    Renal Epithelial, UA 08/24/2020 NOT REPORTED  0 /HPF Final    Bacteria, UA 08/24/2020 FEW* None Final    Mucus, UA 08/24/2020 NOT REPORTED  None Final    Trichomonas, UA 08/24/2020 NOT REPORTED  None Final    Amorphous, UA 08/24/2020 NOT REPORTED  None Final    Other Observations UA 08/24/2020 NOT REPORTED  NOT REQ.  Final    Yeast, UA 08/24/2020 NOT REPORTED  None Final   Hospital Outpatient Visit on 08/17/2020   Component Date Value Ref Range Status    Expiration Date 08/17/2020 08/27/2020,4449   Final    Arm Band Number 08/17/2020 [id number] Final    ABO/Rh 08/17/2020 A POSITIVE   Final    Antibody Screen 08/17/2020 NEGATIVE   Final    WBC 08/17/2020 6.6  3.5 - 11.0 k/uL Final    RBC 08/17/2020 4.46  4.0 - 5.2 m/uL Final    Hemoglobin 08/17/2020 13.4  12.0 - 16.0 g/dL Final    Hematocrit 08/17/2020 40.5  36 - 46 % Final    MCV 08/17/2020 90.9  80 - 100 fL Final    MCH 08/17/2020 30.0  26 - 34 pg Final    MCHC 08/17/2020 33.0  31 - 37 g/dL Final    RDW 08/17/2020 13.6  11.5 - 14.9 % Final    Platelets 10/82/1121 176  150 - 450 k/uL Final    MPV 08/17/2020 8.7  6.0 - 12.0 fL Final    NRBC Automated 08/17/2020 NOT REPORTED  per 100 WBC Final    Differential Type 08/17/2020 NOT REPORTED   Final    Seg Neutrophils 08/17/2020 60  36 - 66 % Final    Lymphocytes 08/17/2020 29  24 - 44 % Final    Monocytes 08/17/2020 7  1 - 7 % Final    Eosinophils % 08/17/2020 3  0 - 4 % Final    Basophils 08/17/2020 1  0 - 2 % Final    Immature Granulocytes 08/17/2020 NOT REPORTED  0 % Final    Segs Absolute 08/17/2020 3.90  1.3 - 9.1 k/uL Final    Absolute Lymph # 08/17/2020 1.90  1.0 - 4.8 k/uL Final    Absolute Mono # 08/17/2020 0.50  0.1 - 1.3 k/uL Final    Absolute Eos # 08/17/2020 0.20  0.0 - 0.4 k/uL Final    Basophils Absolute 08/17/2020 0.10  0.0 - 0.2 k/uL Final    Absolute Immature Granulocyte 08/17/2020 NOT REPORTED  0.00 - 0.30 k/uL Final    WBC Morphology 08/17/2020 NOT REPORTED   Final    RBC Morphology 08/17/2020 NOT REPORTED   Final    Platelet Estimate 12/29/1141 NOT REPORTED   Final    Glucose 08/17/2020 103* 70 - 99 mg/dL Final    BUN 08/17/2020 16  8 - 23 mg/dL Final    CREATININE 08/17/2020 1.08* 0.50 - 0.90 mg/dL Final    Bun/Cre Ratio 08/17/2020 NOT REPORTED  9 - 20 Final    Calcium 08/17/2020 9.5  8.6 - 10.4 mg/dL Final    Sodium 08/17/2020 140  135 - 144 mmol/L Final    Potassium 08/17/2020 4.9  3.7 - 5.3 mmol/L Final    SPECIMEN SLIGHTLY HEMOLYZED, RESULTS MAY BE ADVERSELY AFFECTED.     Chloride 08/17/2020 108* 98 Final    Renal Epithelial, UA 2020 NOT REPORTED  0 /HPF Final    Bacteria, UA 2020 FEW* None Final    Mucus, UA 2020 NOT REPORTED  None Final    Trichomonas, UA 2020 NOT REPORTED  None Final    Amorphous, UA 2020 NOT REPORTED  None Final    Other Observations UA 2020 NOT REPORTED  NOT REQ. Final    Yeast, UA 2020 NOT REPORTED  None Final   Hospital Outpatient Visit on 2020   Component Date Value Ref Range Status    SARS-CoV-2, ANI 2020 Not Detected  Not Detected Final    Comment: (NOTE)  This test was developed and its performance characteristics  determined by [employer]. This test has not been FDA  cleared or approved. This test has been authorized by FDA under an  Emergency Use Authorization (EUA). This test is only authorized for  the duration of time the declaration that circumstances exist  justifying the authorization of the emergency use of in vitro  diagnostic tests for detection of SARS-CoV-2 virus and/or diagnosis  of COVID-19 infection under section 564(b)(1) of the Act, 21 U. S.C.  864LCX-1(D)(6), unless the authorization is terminated or revoked  sooner. When diagnostic testing is negative, the possibility of a  false negative result should be considered in the context of a  patient's recent exposures and the presence of clinical signs and  symptoms consistent with COVID-19. An individual without symptoms of  COVID-19 and who is not shedding SARS-CoV-2 virus would expect to  have a negative (not detected) result in this assay. Performed                            At:  [address] [phone]  Alexia Cintron MD PX:[id number]         Assessment/Plan:  Chata Tucker is a 79 y.o. female  POD #0 S/p Vaginal Cystocele Repair    - Doing well, vitals stable    - Continue patel catheter, UOP appropriate   - Continue IVF NS @ 75 ml/hr   - Encourage ambulation and use of incentive spirometer   - Pain control: Tylenol/Norco   - Labs: H&H in AM   - DVT Proph: SCDs, Heparin BID @ 1900   - Abx: Ancef x 24 hr, followed by Cipro bid x5d outpatient   - Diet: General, advance as tolerated   - Path: pending   - Continue post-op care. Plan for removal of patel catheter tomorrow AM. Patient must urinate within 4 hours (urine output to be measured via hat) and PVR measured and documented. Discussed plan of care with RN.   - Please page/perfectserve OBGYN resident on call with any questions.      CKD Stage 3   - VSS   - Pre-op BMP on 8/17/2020 showed baseline Cr 1.08   - Avoid nephrotoxic medications    - Urine output adequate since surgery, continue to monitor closely        Patient Active Problem List    Diagnosis Date Noted    [place]-Walker grade 2-3 cystocele 01/07/2020     Priority: High    Overflow incontinence 01/07/2020     Priority: High    Post-operative state 08/24/2020    Vaginal Cystocele Repair 8/24/20 08/24/2020    Pancreatitis 09/12/2018    Osteoarthritis     Hypercholesterolemia     Hiatal hernia     CKD (chronic kidney disease) stage 3, GFR 30-59 ml/min ([facility])     Pure hypercholesterolemia 04/13/2018    OA (osteoarthritis) 10/28/2013    Fibromyalgia 10/28/2013    GERD (gastroesophageal reflux disease) 07/06/2012          Krysta Rios DO, PGY-1  Ob/Gyn Resident  [facility]  08/24/20        Senior Residents Attestation Statement  I was present with the resident physician during the history and exam. I discussed the findings and plans with the resident physician and agree as documented in her note     Mary Schaeffer DO   OB/GYN Resident  PGY3  [facility]  8/24/2020, 12:51 PM

## 2020-08-24 NOTE — PROGRESS NOTES
Writer spoke with OB resident, states she will pull [place] tomorrow morning. Patient will need to void within 4 hours and have a post void bladder scan. Patient currently resting in bed comfortably granddaughter at bedside.      Electronically signed by Felicitas Krause RN on 8/24/2020 at 1:03 PM

## 2020-08-24 NOTE — PROGRESS NOTES
Patient provided with incentive spirometer and instructed on its use. Patient verbalized understanding.      Electronically signed by Rashel Corral RN on 8/24/2020 at 1:20 PM

## 2020-08-24 NOTE — H&P
Gynecologic Surgery Pre-Operative Attestation Note:      Facility: [address] [place] [address]  Date: 2020  Time: 8:22 AM      Patient Name: Perry Jarvis  Patient : 1950  Room/Bed: [facility] OR Pool/NONE  Admission Date/Time: 2020  6:23 AM  MRN #: [id number]  [facility] #: [id number]          Attending Physician Statement  I have discussed the care of Perry Jarvis, including pertinent history and exam findings,  with the resident. I have reviewed their note in the electronic medical record. I have seen and examined the patient in the pre-op holding area and the key elements of all parts of the encounter have been performed/reviewed by me . This was completed more than 15 minutes prior to the scheduled surgical start time per the new start time policy. I agree with the assessment, plan and orders as documented by the resident. The procedure risks and complications were discussed as well as the possibility of the need for a second surgery and incomplete removal of abnormal tissue.         Vitals:    20 0758 20 0806   BP:  (!) 138/57   Pulse: 60    Resp: 18    Temp: 97.8 °F (36.6 °C)    TempSrc: Infrared    SpO2: 96%    Weight: 224 lb (101.6 kg)    Height: 5' 5\" (1.651 m)          Hospital Outpatient Visit on 2020   Component Date Value Ref Range Status    Expiration Date 2020,2359   Final    Arm Band Number 2020 [id number]   Final    ABO/Rh 2020 A POSITIVE   Final    Antibody Screen 2020 NEGATIVE   Final    WBC 2020 6.6  3.5 - 11.0 k/uL Final    RBC 2020 4.46  4.0 - 5.2 m/uL Final    Hemoglobin 2020 13.4  12.0 - 16.0 g/dL Final    Hematocrit 2020 40.5  36 - 46 % Final    MCV 2020 90.9  80 - 100 fL Final    MCH 2020 30.0  26 - 34 pg Final    MCHC 2020 33.0  31 - 37 g/dL Final    RDW 2020 13.6  11.5 - 14.9 % Final    Platelets  176  150 - 450 k/uL Final    MPV 2020 8.7  6.0 [email or web address]            GFR Staging 08/17/2020 NOT REPORTED   Final    Specimen Description 08/17/2020 . CLEAN CATCH URINE   Final    Special Requests 08/17/2020 NOT REPORTED   Final    Culture 08/17/2020 NO SIGNIFICANT GROWTH   Final    Color, UA 08/17/2020 YELLOW  YELLOW Final    Turbidity UA 08/17/2020 CLEAR  CLEAR Final    Glucose, Ur 08/17/2020 NEGATIVE  NEGATIVE Final    Bilirubin Urine 08/17/2020 NEGATIVE  NEGATIVE Final    Ketones, Urine 08/17/2020 NEGATIVE  NEGATIVE Final    Specific [place], UA 08/17/2020 1.024  1.000 - 1.030 Final    Urine Hgb 08/17/2020 NEGATIVE  NEGATIVE Final    pH, UA 08/17/2020 6.0  5.0 - 8.0 Final    Protein, UA 08/17/2020 NEGATIVE  NEGATIVE Final    Urobilinogen, Urine 08/17/2020 Normal  Normal Final    Nitrite, Urine 08/17/2020 NEGATIVE  NEGATIVE Final    Leukocyte Esterase, Urine 08/17/2020 SMALL* NEGATIVE Final    Urinalysis Comments 08/17/2020 NOT REPORTED   Final    Microalb, Ur 08/17/2020 37* <21 mg/L Final    Creatinine, Ur 08/17/2020 210.0  28.0 - 217.0 mg/dL Final    Microalb/Crt. Ratio 08/17/2020 18  <25 mcg/mg creat Final    - 08/17/2020        Final    WBC, UA 08/17/2020 10 TO 20  /HPF Final    RBC, UA 08/17/2020 0 TO 2  /HPF Final    Casts UA 08/17/2020 NOT REPORTED  /LPF Final    Crystals, UA 08/17/2020 NOT REPORTED  None /HPF Final    Epithelial Cells UA 08/17/2020 5 TO 10  /HPF Final    Renal Epithelial, UA 08/17/2020 NOT REPORTED  0 /HPF Final    Bacteria, UA 08/17/2020 FEW* None Final    Mucus, UA 08/17/2020 NOT REPORTED  None Final    Trichomonas, UA 08/17/2020 NOT REPORTED  None Final    Amorphous, UA 08/17/2020 NOT REPORTED  None Final    Other Observations UA 08/17/2020 NOT REPORTED  NOT REQ.  Final    Yeast, UA 08/17/2020 NOT REPORTED  None Final   Hospital Outpatient Visit on 08/20/2020   Component Date Value Ref Range Status    SARS-CoV-2, ANI 08/20/2020 Not Detected  Not Detected Final Comment: (NOTE)  This test was developed and its performance characteristics  determined by [employer]. This test has not been FDA  cleared or approved. This test has been authorized by FDA under an  Emergency Use Authorization (EUA). This test is only authorized for  the duration of time the declaration that circumstances exist  justifying the authorization of the emergency use of in vitro  diagnostic tests for detection of SARS-CoV-2 virus and/or diagnosis  of COVID-19 infection under section 564(b)(1) of the Act, 21 U. S.C.  871VFD-1(J)(0), unless the authorization is terminated or revoked  sooner. When diagnostic testing is negative, the possibility of a  false negative result should be considered in the context of a  patient's recent exposures and the presence of clinical signs and  symptoms consistent with COVID-19. An individual without symptoms of  COVID-19 and who is not shedding SARS-CoV-2 virus would expect to  have a negative (not detected) result in this assay. Performed                            At: [address] [id number]  Kristal Link MD Y     ]        The patient was counseled at length about the risks of michelet Covid-19 in the keegan-operative and post-operative states including the recovery window of their procedure. The patient was made aware that michelet Covid-19 after a surgical procedure may worsen their prognosis for recovering from the virus and lend to a higher morbidity and or mortality risk. The patient was given the options of postponing their procedure. All of the risks, benefits, and alternatives were discussed. The patient  does wish to proceed with the procedure. Assessment:      Diagnosis Orders   1. [place]-Walker grade 2 cystocele      2. Overflow incontinence      3. Atrophy of vagina      4. CKD (chronic kidney disease) stage 3, GFR 30-59 ml/min ([facility])      5.  History of blood transfusion                Patient Active Problem List     Diagnosis Date Noted    [place]-Walker grade 2-3 cystocele 01/07/2020       Priority: High    Overflow incontinence 01/07/2020       Priority: High    CKD (chronic kidney disease) stage 3, GFR 30-59 ml/min ([facility])         Priority: Medium    OA (osteoarthritis) 10/28/2013       Priority: Medium    GERD (gastroesophageal reflux disease) 07/06/2012       Priority: Medium    Acute pain of left knee 11/20/2019    Obesity, Class II, BMI 35-39.9 09/19/2018    Pancreatitis 09/12/2018    Osteoarthritis      Hypercholesterolemia      Hiatal hernia      Pure hypercholesterolemia 04/13/2018    Fibromyalgia 10/28/2013    Health care maintenance 04/25/2013              Plan:  1. Anterior Colporrhaphy  2. Premarin cream bid 3 weeks pre-op  No orders of the defined types were placed in this encounter.          Counseling: The patient was counseled on all options both medical and surgical, conservative as well as definitive. She has elected to proceed with the procedure as stated above.     The patient was counseled on the procedure. Risks and complications were reviewed in detail. The patients orders, labs, consents have been completed. The history and physical as well as all supporting surgical documentation will be forwarded to the pre-operative holding area.     The patient is aware that this procedure may not alleviate her symptoms. That there may be a necessity for a second surgery and that there may be an incomplete removal of abnormal tissue.     Patient was counseled on breakdown and need for self cath. She is not sexually active.                        Home care, Restrictions & Follow up Care review completed    Planned Office Follow up was reviewed with the patient and her family and is for  2 weeks. The patient will call to make this appointment unless she already has done so.

## 2020-08-24 NOTE — PROGRESS NOTES
Patient Name: Sergio Mendes  Patient : 1950  Room/Bed: 0975/5856-33  Admission Date/Time: 2020  6:23 AM  MRN #: [id number]  [facility] #: [id number]      Date: 2020  Time: 6:16 AM          POD#: 1  Procedure Completed: Amanda Hester is a 79 y.o. female  This patient was seen today. She is resting comfortably in bed. Today she she feels well. Her pain is well controlled with current pain medications. She has Tylenol and Norco ordered, but has only required Tylenol since surgery. The patient is urinating adequately via patel catheter . She denies chest pain, shortness of breath, lightheadedness, palpitations, fevers/chills, and N/V. She is ambulating well. She denies any vaginal bleeding. The patient is tolerating a normal diet. She is passing flatus. PHYSICAL EXAM:  Vitals:    20 1338 20 1823 20 2346 20 0323   BP: (!) 138/55 (!) 102/56 (!) 113/57 (!) 101/48   Pulse: (!) 48 63 (!) 46 (!) 42   Resp: 18 18 16    Temp: 97.7 °F (36.5 °C) 97.7 °F (36.5 °C) 97.7 °F (36.5 °C)    TempSrc: Oral Oral Oral    SpO2: 95% 94% 96%    Weight:       Height:              Intake/Output:   Last Shift: I/O last 3 completed shifts: In: 750 [I.V.:750]  Out: 555 [Urine:550; Blood:5]  Current Shift: I/O this shift:  In: -   Out: 625 [Urine:625]   Urine Output: 625 mL clear urine out over last 12 hours ~52mL/h    General appearance: no apparent distress, alert and cooperative  HEENT: head atraumatic, normocephalic, trachea midline, moist mucous membranes   Neurologic: alert, oriented, normal speech, no focal findings or movement disorder noted  Lungs: no increased work of breathing, good air exchange, clear to auscultation bilaterally, no crackles or wheezing  Heart: bradycardia with regular rhythm. No gallops, murmurs appreciated.    Abdomen: soft, non distended, bowel sounds appreciated in all 4 quadrants, appropriately tender around incisions, no guarding, no rebound tenderness, no rigidity, negative CVA tenderness bilaterally   Extremities:  no calf tenderness bilaterally, non-edematous bilaterally, SCDs in place bilaterally and functioning    Musculoskeletal: no gross abnormalities, range of motion appropriate for age   Psychiatric: mood appropriate, normal affect   Pelvic Exam: vaginal packing removed with minimal serosanguinous drainage    Recent Results (from the past 2 hour(s))   Hemoglobin and Hematocrit, Blood    Collection Time: 08/25/20  5:50 AM   Result Value Ref Range    Hemoglobin 12.1 12.0 - 16.0 g/dL    Hematocrit 35.6 (L) 36 - 46 %         Admission on 08/24/2020   Component Date Value Ref Range Status    Color, UA 08/24/2020 YELLOW  YELLOW Final    Turbidity UA 08/24/2020 CLEAR  CLEAR Final    Glucose, Ur 08/24/2020 NEGATIVE  NEGATIVE Final    Bilirubin Urine 08/24/2020 Presumptive positive. Unable to confirm due to unavailability of reagent. * NEGATIVE Corrected    CORRECTED ON 08/24 AT 1151: PREVIOUSLY REPORTED AS SMALL AMOUNT    Ketones, Urine 08/24/2020 SMALL* NEGATIVE Final    Specific Gravity, UA 08/24/2020 1.020  1.000 - 1.030 Final    Urine Hgb 08/24/2020 NEGATIVE  NEGATIVE Final    pH, UA 08/24/2020 6.0  5.0 - 8.0 Final    Protein, UA 08/24/2020 TRACE* NEGATIVE Final    Urobilinogen, Urine 08/24/2020 Normal  Normal Final    Nitrite, Urine 08/24/2020 NEGATIVE  NEGATIVE Final    Leukocyte Esterase, Urine 08/24/2020 NEGATIVE  NEGATIVE Final    Urinalysis Comments 08/24/2020 NOT REPORTED   Final    - 08/24/2020        Final    WBC, UA 08/24/2020 2 TO 5  /HPF Final    RBC, UA 08/24/2020 0 TO 2  /HPF Final    Casts UA 08/24/2020 NOT REPORTED  /LPF Final    Crystals, UA 08/24/2020 NOT REPORTED  None /HPF Final    Epithelial Cells UA 08/24/2020 2 TO 5  /HPF Final    Renal Epithelial, UA 08/24/2020 NOT REPORTED  0 /HPF Final    Bacteria, UA 08/24/2020 FEW* None Final    Mucus, UA 08/24/2020 NOT REPORTED  None Final    Trichomonas, UA 08/24/2020 NOT REPORTED  None Final    Amorphous, UA 08/24/2020 NOT REPORTED  None Final    Other Observations UA 08/24/2020 NOT REPORTED  NOT REQ. Final    Yeast, UA 08/24/2020 NOT REPORTED  None Final    Hemoglobin 08/25/2020 12.1  12.0 - 16.0 g/dL Final    Hematocrit 08/25/2020 35.6* 36 - 46 % Final   Hospital Outpatient Visit on 08/20/2020   Component Date Value Ref Range Status    SARS-CoV-2, ANI 08/20/2020 Not Detected  Not Detected Final    Comment: (NOTE)  This test was developed and its performance characteristics  determined by [employer]. This test has not been FDA  cleared or approved. This test has been authorized by FDA under an  Emergency Use Authorization (EUA). This test is only authorized for  the duration of time the declaration that circumstances exist  justifying the authorization of the emergency use of in vitro  diagnostic tests for detection of SARS-CoV-2 virus and/or diagnosis  of COVID-19 infection under section 564(b)(1) of the Act, 21 U. S.C.  535IOM-8(E)(3), unless the authorization is terminated or revoked  sooner. When diagnostic testing is negative, the possibility of a  false negative result should be considered in the context of a  patient's recent exposures and the presence of clinical signs and  symptoms consistent with COVID-19. An individual without symptoms of  COVID-19 and who is not shedding SARS-CoV-2 virus would expect to  have a negative (not detected) result in this assay. Performed                            At:  [address] [id number]  Gerardo Simons MD NH:[id number]     Hospital Outpatient Visit on 08/17/2020   Component Date Value Ref Range Status    Expiration Date 08/17/2020 08/27/2020,2359   Final    Arm Band Number 08/17/2020 [id number]   Final    ABO/Rh 08/17/2020 A POSITIVE   Final    Antibody Screen 08/17/2020 NEGATIVE   Final    WBC 08/17/2020 6.6  3.5 - 11.0 k/uL Final    RBC 08/17/2020 4.46  4.0 - 5.2 m/uL Final    Hemoglobin 08/17/2020 13.4  12.0 - 16.0 g/dL Final    Hematocrit 08/17/2020 40.5  36 - 46 % Final    MCV 08/17/2020 90.9  80 - 100 fL Final    MCH 08/17/2020 30.0  26 - 34 pg Final    MCHC 08/17/2020 33.0  31 - 37 g/dL Final    RDW 08/17/2020 13.6  11.5 - 14.9 % Final    Platelets 20/14/9711 176  150 - 450 k/uL Final    MPV 08/17/2020 8.7  6.0 - 12.0 fL Final    NRBC Automated 08/17/2020 NOT REPORTED  per 100 WBC Final    Differential Type 08/17/2020 NOT REPORTED   Final    Seg Neutrophils 08/17/2020 60  36 - 66 % Final    Lymphocytes 08/17/2020 29  24 - 44 % Final    Monocytes 08/17/2020 7  1 - 7 % Final    Eosinophils % 08/17/2020 3  0 - 4 % Final    Basophils 08/17/2020 1  0 - 2 % Final    Immature Granulocytes 08/17/2020 NOT REPORTED  0 % Final    Segs Absolute 08/17/2020 3.90  1.3 - 9.1 k/uL Final    Absolute Lymph # 08/17/2020 1.90  1.0 - 4.8 k/uL Final    Absolute Mono # 08/17/2020 0.50  0.1 - 1.3 k/uL Final    Absolute Eos # 08/17/2020 0.20  0.0 - 0.4 k/uL Final    Basophils Absolute 08/17/2020 0.10  0.0 - 0.2 k/uL Final    Absolute Immature Granulocyte 08/17/2020 NOT REPORTED  0.00 - 0.30 k/uL Final    WBC Morphology 08/17/2020 NOT REPORTED   Final    RBC Morphology 08/17/2020 NOT REPORTED   Final    Platelet Estimate 82/44/8418 NOT REPORTED   Final    Glucose 08/17/2020 103* 70 - 99 mg/dL Final    BUN 08/17/2020 16  8 - 23 mg/dL Final    CREATININE 08/17/2020 1.08* 0.50 - 0.90 mg/dL Final    Bun/Cre Ratio 08/17/2020 NOT REPORTED  9 - 20 Final    Calcium 08/17/2020 9.5  8.6 - 10.4 mg/dL Final    Sodium 08/17/2020 140  135 - 144 mmol/L Final    Potassium 08/17/2020 4.9  3.7 - 5.3 mmol/L Final    SPECIMEN SLIGHTLY HEMOLYZED, RESULTS MAY BE ADVERSELY AFFECTED.     Chloride 08/17/2020 108* 98 - 107 mmol/L Final    CO2 08/17/2020 23  20 - 31 mmol/L Final    Anion Gap 08/17/2020 9  9 - 17 mmol/L Final    GFR Non- 08/17/2020 50* >60 mL/min Final    GFR  08/17/2020 >60  >60 mL/min Final    GFR Comment 08/17/2020        Final    Comment: Average GFR for 79or more years old:   76 mL/min/1.73sq m  Chronic Kidney Disease:   <60 mL/min/1.73sq m  Kidney failure:   <15 mL/min/1.73sq m              eGFR calculated using average adult body mass. Additional eGFR calculator available at:        [email or web address]            GFR Staging 08/17/2020 NOT REPORTED   Final    Specimen Description 08/17/2020 . CLEAN CATCH URINE   Final    Special Requests 08/17/2020 NOT REPORTED   Final    Culture 08/17/2020 NO SIGNIFICANT GROWTH   Final    Color, UA 08/17/2020 YELLOW  YELLOW Final    Turbidity UA 08/17/2020 CLEAR  CLEAR Final    Glucose, Ur 08/17/2020 NEGATIVE  NEGATIVE Final    Bilirubin Urine 08/17/2020 NEGATIVE  NEGATIVE Final    Ketones, Urine 08/17/2020 NEGATIVE  NEGATIVE Final    Specific [place], UA 08/17/2020 1.024  1.000 - 1.030 Final    Urine Hgb 08/17/2020 NEGATIVE  NEGATIVE Final    pH, UA 08/17/2020 6.0  5.0 - 8.0 Final    Protein, UA 08/17/2020 NEGATIVE  NEGATIVE Final    Urobilinogen, Urine 08/17/2020 Normal  Normal Final    Nitrite, Urine 08/17/2020 NEGATIVE  NEGATIVE Final    Leukocyte Esterase, Urine 08/17/2020 SMALL* NEGATIVE Final    Urinalysis Comments 08/17/2020 NOT REPORTED   Final    Microalb, Ur 08/17/2020 37* <21 mg/L Final    Creatinine, Ur 08/17/2020 210.0  28.0 - 217.0 mg/dL Final    Microalb/Crt.  Ratio 08/17/2020 18  <25 mcg/mg creat Final    - 08/17/2020        Final    WBC, UA 08/17/2020 10 TO 20  /HPF Final    RBC, UA 08/17/2020 0 TO 2  /HPF Final    Casts UA 08/17/2020 NOT REPORTED  /LPF Final    Crystals, UA 08/17/2020 NOT REPORTED  None /HPF Final    Epithelial Cells UA 08/17/2020 5 TO 10  /HPF Final    Renal Epithelial, UA 08/17/2020 NOT REPORTED  0 /HPF Final    Bacteria, UA 08/17/2020 FEW* None Final    Mucus, UA 08/17/2020 NOT REPORTED  None Final    Trichomonas, UA 2020 NOT REPORTED  None Final    Amorphous, UA 2020 NOT REPORTED  None Final    Other Observations UA 2020 NOT REPORTED  NOT REQ. Final    Yeast, UA 2020 NOT REPORTED  None Final       Assessment/Plan:  Natalia Pereira is a 79 y.o. female  POD #1 S/p Vaginal Cystocele Repair               - Doing well, afebrile, vitals stable- HR slightly low. Patient is asymptomatic              - Discontinue patel catheter. Patient must urinate within 4 hours  (4 hours at 1015). Urine output and PVR to be measured and documented. Discussed plan of care with RN.              - Discontinue fluids if UO and PRV adequate              - Encourage ambulation and use of incentive spirometer               - Pain control: Tylenol/Norco ordered, but patient is not taking Norco. Will discontinue order.              - Labs: Hgb stable this morning at 12.1              - DVT Proph: SCDs, Heparin BID               - Abx: S/p Ancef x 24 hr, Will start Cipro bid x5d outpatient              - Diet: General, advance as tolerated              - Path: pending   - Vaginal packing removed. Minimal serosanguinous drainage   - Urine Output adequate               - Continue post-op care. - Please page/perfectserve OBGYN resident on call with any questions. - Anticipate discharge home today. All post op restrictions, discharge instructions, and return precautions discussed with patient. She is to follow up for post op visit in 2 weeks. Patient verbalized understanding.  All questions/concerns addressed with patient.       CKD Stage 3              - Pre-op BMP on 2020 showed baseline Cr 1.08              - Avoid nephrotoxic medications               - Urine output adequate since surgery    Asymptomatic Bradycardia   - Vitals otherwise stable   - Patient denies any symptoms including fatigue, lightheadedness, dizziness, chest pain, SOB, changes regarding swelling in bilateral lower extremities. - Chart review showed patient has had low HR in past at office visits showing as low as 50bpm on 3/17/2020.    - EK2020 showed sinus bradycardia with no significant change from previous EKG on 2018   - Will discuss plan of care with attending. Possible EKG and IM consult prior to discharge. Patient Active Problem List    Diagnosis Date Noted    [place]-Walker grade 2-3 cystocele 2020     Priority: High    Overflow incontinence 2020     Priority: High    Post-operative state 2020    Vaginal Cystocele Repair 2020    Pancreatitis 2018    Osteoarthritis     Hypercholesterolemia     Hiatal hernia     CKD (chronic kidney disease) stage 3, GFR 30-59 ml/min ([facility])     Pure hypercholesterolemia 2018    OA (osteoarthritis) 10/28/2013    Fibromyalgia 10/28/2013    GERD (gastroesophageal reflux disease) 2012       Marcus Jaquez DO, PGY-1  Ob/Gyn Resident  [facility]  20    Senior Resident Physician Statement  I have discussed the case, including pertinent history and exam findings with the above resident. I have personally seen the patient. I agree with the assessment, plan and orders as documented. I have made changes to the above note as needed. I have discussed the case with above named attending. All discharge instructions reviewed with the patient. Patient is in agreement with plan. All questions answered.     Kristen Kessler DO  OB/GYN Resident  2020  6:16 AM

## 2020-08-24 NOTE — ANESTHESIA PRE PROCEDURE
Department of Anesthesiology  Preprocedure Note       Name:  Ze Wei   Age:  79 y.o.  :  1950                                          MRN:  [id number]         Date:  2020      Surgeon: Bobby Herrera):  Castro Ibarra DO    Procedure: Procedure(s):  VAGINAL CYSTOCELE REPAIR    Medications prior to admission:   Prior to Admission medications    Medication Sig Start Date End Date Taking? Authorizing Provider   omeprazole (PRILOSEC) 20 MG delayed release capsule Take 1 capsule by mouth twice daily 20  Yes Willem Sheridan MD   estradiol (ESTRACE VAGINAL) 0.1 MG/GM vaginal cream Place 0.5 g vaginally 2 times daily 20  Yes TOI Banks NP   acetaminophen (TYLENOL) 500 MG tablet Take 1,000 mg by mouth 2 times daily as needed    Yes Historical Provider, MD   atorvastatin (LIPITOR) 10 MG tablet TAKE 1 TABLET BY MOUTH ONE TIME A DAY 20   TOI Glover CNP   DiphenhydrAMINE HCl (BENADRYL ALLERGY PO) Take 25 mg by mouth every 6 hours as needed Takes 1 -2  At a time    Historical Provider, MD   [address] [place] [address] It is my medical opinin that Xiomara Fair requires a disability parking placard for the following reasons:  She has limited walking ability due to an arthritic condition.   Duration of need: permanent 3/26/18   TOI Hernandez CNP       Current medications:    Current Facility-Administered Medications   Medication Dose Route Frequency Provider Last Rate Last Dose    lactated ringers infusion   Intravenous Continuous Jennifer Day MD        lidocaine PF 1 % injection 1 mL  1 mL Intradermal Once PRN Jennifer Day MD        sodium chloride flush 0.9 % injection 10 mL  10 mL Intravenous 2 times per day Jennifer Day MD        sodium chloride flush 0.9 % injection 10 mL  10 mL Intravenous PRN Jennifer Day MD        ceFAZolin (ANCEF) 2 g in dextrose 5 % 50 mL IVPB  2 g Intravenous On Call to iconDial, DO        lactated ringers infusion Intravenous Continuous Nixon Noel  mL/hr at 08/24/20 6380      sodium chloride flush 0.9 % injection 10 mL  10 mL Intravenous 2 times per day Nixon Noel DO        sodium chloride flush 0.9 % injection 10 mL  10 mL Intravenous PRN Nixon Noel DO           Allergies: Allergies   Allergen Reactions    Seasonal Other (See Comments)     STUFFY NOSE, FLUID IN EARS       Problem List:    Patient Active Problem List   Diagnosis Code    GERD (gastroesophageal reflux disease) K21.9    Health care maintenance Z00.00    OA (osteoarthritis) M19.90    Fibromyalgia M79.7    Pure hypercholesterolemia E78.00    Osteoarthritis M19.90    Hypercholesterolemia E78.00    Hiatal hernia K44.9    CKD (chronic kidney disease) stage 3, GFR 30-59 ml/min (HCC) N18.3    Obesity, Class II, BMI 35-39.9 E66.9    Acute pain of left knee M25.562    Pancreatitis K85.90    [place]-Walker grade 2-3 cystocele N81.10    Overflow incontinence N39.490       Past Medical History:        Diagnosis Date    Anesthesia     woke up crying x1    Anesthesia complication     PATIENT WAKES UP CRYING AFTER ANESTHESIA    Cataract     BILATERAL EYES    CKD (chronic kidney disease) stage 3, GFR 30-59 ml/min (HCC)     Constipation     Fibromyalgia     Frequent stools     GERD (gastroesophageal reflux disease)     Hiatal hernia     High calcium levels     hx.  of , had\" lower left thyroid removed\"    History of blood transfusion 1990    AUTOLOGUS X1 AFTER HYSTERECTOMY    Hypercholesterolemia     OA (osteoarthritis) 10/28/2013    Osteoarthritis     Parathyroid adenoma     PONV (postoperative nausea and vomiting)     Slow urinary stream     Snores     Wears glasses        Past Surgical History:        Procedure Laterality Date    APPENDECTOMY  1964    CARPAL TUNNEL RELEASE Bilateral     CHOLECYSTECTOMY      COLONOSCOPY      1.4.2011, 8.23.2011    CYST REMOVAL Left     GANGLION REMOVED 2 TIMES    ENDOSCOPY, COLON, DIAGNOSTIC      EGD    HYSTERECTOMY      UTERUS ONLY (GURU)    PARATHYROIDECTOMY  2018    LA EXPLORE PARATHYROID GLANDS Left 2018    PARATHYROIDECTOMY LOWER, NIM MONITOR performed by Gabrielle Woodall MD at [address] [facility] ENDOSCOPY      2005, 2010    UPPER GASTROINTESTINAL ENDOSCOPY         Social History:    Social History     Tobacco Use    Smoking status: Former Smoker     Packs/day: 2.00     Years: 20.00     Pack years: 40.00     Types: Cigarettes     Last attempt to quit: 1990     Years since quittin.0    Smokeless tobacco: Never Used   Substance Use Topics    Alcohol use: No                                Counseling given: Not Answered      Vital Signs (Current):   Vitals:    20 0758 20 0806   BP:  (!) 138/57   Pulse: 60    Resp: 18    Temp: 97.8 °F (36.6 °C)    TempSrc: Infrared    SpO2: 96%    Weight: 224 lb (101.6 kg)    Height: 5' 5\" (1.651 m)                                               BP Readings from Last 3 Encounters:   20 (!) 138/57   20 (!) 149/71   20 122/80       NPO Status: Time of last liquid consumption: 1700                        Time of last solid consumption: 1100                        Date of last liquid consumption: 20                        Date of last solid food consumption: 20    BMI:   Wt Readings from Last 3 Encounters:   20 224 lb (101.6 kg)   20 224 lb (101.6 kg)   20 225 lb (102.1 kg)     Body mass index is 37.28 kg/m².     CBC:   Lab Results   Component Value Date    WBC 6.6 2020    RBC 4.46 2020    HGB 13.4 2020    HCT 40.5 2020    MCV 90.9 2020    RDW 13.6 2020     2020       CMP:   Lab Results   Component Value Date     2020    K 4.9 2020     2020    CO2 23 2020    BUN 16 2020    CREATININE 1.08 patient.                       Beulah Harvey MD   8/24/2020

## 2020-08-24 NOTE — DISCHARGE SUMMARY
Gyn Discharge Summary  [facility]      Patient Name: Albertina Moran  Patient : 1950  Primary Care Physician: Layla Paulino MD  Admit Date: 2020    Principal Diagnosis: CYSTOCELE Grade 2 with Overflow incontinence    Other Diagnosis:   Post-operative state [Z98.890]  Post-operative state [Z98.890]  Patient Active Problem List   Diagnosis    GERD (gastroesophageal reflux disease)    OA (osteoarthritis)    Fibromyalgia    Pure hypercholesterolemia    Osteoarthritis    Hypercholesterolemia    Hiatal hernia    CKD (chronic kidney disease) stage 3, GFR 30-59 ml/min ([facility])    Pancreatitis    [place]-Walker grade 2-3 cystocele    Overflow incontinence    Post-operative state    Vaginal Cystocele Repair 20       Infection: No  Hospital Acquired: No    Surgical Operations & Procedures: VAGINAL CYSTOCELE REPAIR    Consultations: Anesthesia, Family Medicine, and Cardiology     Pertinent Findings & Procedures:   Albertina Moran is a 79 y.o. female , admitted for surgical correction of a Grade 2 Cystocele with overflow incontence ; received ancef x 24 hours, heparin BID. She underwent vaginal cystocele repair on 20. Post-op course normal, discharged home on 2020. Follow up in 2 weeks. Discharge instructions reviewed and questions answered. Course of patient:   POD #1: H&H stable at 12.1. Initial , repeat was 148. Patient had episodes of asymptomatic bradycardia and family medicine and cardiology were consulted. POD #2: Cleared by cardiology for discharge home with holter monitor and f/u in 1 week.       Discharge to: Home    Readmission planned: No    Recommendations on Discharge:     Medications:     Medication List      START taking these medications    ciprofloxacin 500 MG tablet  Commonly known as:  CIPRO  Take 1 tablet by mouth 2 times daily for 5 days     docusate sodium 100 MG capsule  Commonly known as:  COLACE  Take 1 capsule by mouth 2

## 2020-08-24 NOTE — OP NOTE
Gynecologic Operative Note      NAME: Albertina Moran   MRN: [id number]  CSN: [id number]  : 1950    PROCEDURE DATE: 2020     Pre-op Diagnosis: CYSTOCELE Grade 2 with Overflow incontinence     Post-op Diagnosis: Same;     Procedure: Procedure(s):  VAGINAL CYSTOCELE REPAIR    Surgeon: Gerald Rodríguez DO    Assistant:   1. Nguyen Segundo DO  2.  ANA Ivan DO    Circulator Documentation of Staff:  Surgeon(s) and Role:     * Gerald Rodríguez DO - Primary    OR Staff:  Scrub Person First: Siva Castellanos      Anesthesia Type: General  Anesthesia Staff: Anesthesiologist: Betty Martinez MD  CRNA: TOI Simental CRNA      Complications: None      Estimated Blood Loss: 5 ml  Total IV Fluids:  600 ml  Urine Output: 100 ml clear      Specimens:   ID Type Source Tests Collected by Time Destination   1 : URINE PER SINGH PROTOCOL Urine Urine, indwelling catheter URINE RT REFLEX TO CULTURE Gerald Rodríguez DO 2020 1440    A : VAGINAL MUCOSA Tissue Vaginal SURGICAL PATHOLOGY Gerald Rodríguez, DO 2020 0915      Implants: * No implants in log *    Drains:   Urethral Catheter 16 fr (Active)   Urine Color Yellow 20 0854   Urine Appearance Clear 20 0854         Condition: Stable    Findings: No adnexal masses or fullness BL. Absent uterus and cervix. Grade 2 cystocele. Description of Procedure: (Understanding of limitations from template op-reports exist)  The patient was seen in the pre-operative area. The procedure risk and complications were reviewed. The consent , labs , and H&P were reviewed. The patient had all of her questions answered. The patient was moved to the operative suite where she was placed under general anesthesia by the anesthesiologist.  The patient was then timed out. The patient  had a sterile prep and drape with EPC's in place, SCIP protocol antibiotics were infused, and a singh catheter was draining clear yellow urine.  The patient was placed in the dorsal lithotomy position utilizing Yellofin Stirrups. The Positioning was checked without stress or pressure on any joints. CYSTOCELE:  A weighted speculum was placed into the vaginal vault. A grade 2-3 cystocele was identified. Utilizing two allis clamps and beginning 1 cm inferior to the urethral meatus in the midline the vaginal mucosa was grasped and the distal end of the cystocele was also identified and grasped in the midline. Utilizing traction and counter traction keeping the tissue taught a #15 blade was utilized to score the vaginal mucosa in the midline. Placement of multiple allis clamps on edge allowed for a small amount of bovie cautery at 20 and blunt dissection to release the vaginal mucosa from the underlying bladder tissue bilaterally. Now being able to visualize the pubovesical fascia bilaterally utilizing 2-0 vicryl sutures on a CT 2 needle. Box stitches were placed staying parallel to the incision at the pubovesical fascia. All stitches were tagged until they were all in place. Once all the stitches were in place they were tied re-approximating and securing the fascial edges and obliterating the cystocele and giving excellent floor support to the bladder. Any small defects were closed independently with interrupted 2-0 vicryl stitches. The vaginal mucosa was then trimmed with metzenbaum scissors. The vaginal mucosa was then closed in a running and locking 2-0 vicryl suture. Excellent hemostasis was noted. The urine was clear at the completion of the procedure. A 1 cm Kerlix with premarin cream was used to pack the vagina. It will be removed in the AM.    The patient will get PVR testing once the catheter is removed in 24 hours. The caliber of the newly reconstructed vaginal vault was 2.5 fingers breath wide with excellent length and support. Sponge, needle and instrument counts were called for and found to be correct.   The patient was brought out of anesthesia and moved to recovery in apparent stable and satisfactory condition. She will be admitted to the GYN service. EPC's and Heparin therapy for thromboembolic prophylaxis, SCIP ABX continue. PVR testing in the am with bladder scanning. Disposition:  The patient will return to my office in 2 weeks. DC RX Ciprofloxacin 500 mg po bid x 5 days #10. We will review her pathology report and any further recommendations. She was counseled with her family that she is to report any temperature more than 100.4 F, pelvic pain, or heavy vaginal bleeding; She is to refrain from intercourse douching or tampons; No hot tubs baths lakes or pools. No driving. The patient voiced understanding of the above counseling.     Electronically signed by Nisha Segovia DO on 8/24/20 at 9:20 AM EDT

## 2020-08-25 LAB
HCT VFR BLD CALC: 35.6 % (ref 36–46)
HEMOGLOBIN: 12.1 G/DL (ref 12–16)
SURGICAL PATHOLOGY REPORT: NORMAL

## 2020-08-25 PROCEDURE — 85014 HEMATOCRIT: CPT

## 2020-08-25 PROCEDURE — 6360000002 HC RX W HCPCS: Performed by: STUDENT IN AN ORGANIZED HEALTH CARE EDUCATION/TRAINING PROGRAM

## 2020-08-25 PROCEDURE — 96372 THER/PROPH/DIAG INJ SC/IM: CPT

## 2020-08-25 PROCEDURE — 51798 US URINE CAPACITY MEASURE: CPT

## 2020-08-25 PROCEDURE — G0378 HOSPITAL OBSERVATION PER HR: HCPCS

## 2020-08-25 PROCEDURE — 36415 COLL VENOUS BLD VENIPUNCTURE: CPT

## 2020-08-25 PROCEDURE — 99024 POSTOP FOLLOW-UP VISIT: CPT | Performed by: OBSTETRICS & GYNECOLOGY

## 2020-08-25 PROCEDURE — 85018 HEMOGLOBIN: CPT

## 2020-08-25 PROCEDURE — 96366 THER/PROPH/DIAG IV INF ADDON: CPT

## 2020-08-25 PROCEDURE — 6370000000 HC RX 637 (ALT 250 FOR IP): Performed by: STUDENT IN AN ORGANIZED HEALTH CARE EDUCATION/TRAINING PROGRAM

## 2020-08-25 PROCEDURE — 99220 PR INITIAL OBSERVATION CARE/DAY 70 MINUTES: CPT | Performed by: FAMILY MEDICINE

## 2020-08-25 PROCEDURE — 1200000000 HC SEMI PRIVATE

## 2020-08-25 PROCEDURE — 2580000003 HC RX 258: Performed by: STUDENT IN AN ORGANIZED HEALTH CARE EDUCATION/TRAINING PROGRAM

## 2020-08-25 RX ADMIN — HEPARIN SODIUM 5000 UNITS: 5000 INJECTION INTRAVENOUS; SUBCUTANEOUS at 07:17

## 2020-08-25 RX ADMIN — HEPARIN SODIUM 5000 UNITS: 5000 INJECTION INTRAVENOUS; SUBCUTANEOUS at 20:33

## 2020-08-25 RX ADMIN — ACETAMINOPHEN 1000 MG: 500 TABLET, FILM COATED ORAL at 22:04

## 2020-08-25 RX ADMIN — CEFAZOLIN 2 G: 1 INJECTION, POWDER, FOR SOLUTION INTRAMUSCULAR; INTRAVENOUS at 02:35

## 2020-08-25 ASSESSMENT — PAIN SCALES - GENERAL
PAINLEVEL_OUTOF10: 4
PAINLEVEL_OUTOF10: 0
PAINLEVEL_OUTOF10: 0

## 2020-08-25 NOTE — FLOWSHEET NOTE
08/25/20 0323   Vital Signs   Pulse (!) 42   BP (!) 101/48   MAP (mmHg) (!) 61     Writer updated Dr Robert Dash for a second time regarding patients HR and BP. HR now 42 and /48. Dr Robert Dash responds and states, \" as long as she is asymptomatic that's fine for now. \" No orders received at this time. Will continue to monitor.

## 2020-08-25 NOTE — PROGRESS NOTES
Perfect serve sent to Dr Edwin Gomez regarding patients HR being 46 and BP being 113/57. Awaiting response. Patient asymptomatic at this time. Will continue to monitor.

## 2020-08-25 NOTE — PROGRESS NOTES
Patient Name: Shanti Bonds  Patient : 1950  Room/Bed: 0733/5997-81  Admission Date/Time: 2020  6:23 AM  MRN #: [id number]  [facility] #: [id number]      Date: 2020  Time: 7:08 AM      Shanti Bonds is a 79 y.o. female  This patient was seen today. POD#:2  Procedure Completed: VAGINAL CYSTOCELE REPAIR      Today she she feels well. Her pain is well controlled with current pain medications. The patient is urinating well . She denies chest pain, shortness of breath and headache. She is ambulating well. She denies any vaginal bleeding. The patient is tolerating a normal diet. She is passing flatus.       PHYSICAL EXAM:  Vitals:    20 0659 20 1256 20 1904 20 0544   BP: (!) 114/43 121/71 120/60    Pulse: (!) 42 60 50    Resp: 16 18 18    Temp: 97.7 °F (36.5 °C) 97.9 °F (36.6 °C) 97.9 °F (36.6 °C)    TempSrc: Oral Oral Oral    SpO2: 97% 98% 95%    Weight:    238 lb 1.6 oz (108 kg)   Height:           General appearance: no apparent distress, alert and cooperative  HEENT: head atraumatic, normocephalic, trachea midline, moist mucous membranes   Neurologic: alert, oriented, normal speech, no focal findings or movement disorder noted  Lungs: no increased work of breathing, good air exchange, clear to auscultation bilaterally, no crackles or wheezing  Heart: audible bradycardia, regular rhythm, no S1 or S2, no murmur audible  Abdomen: soft, non distended, bowel sounds appreciated in all 4 quadrants, appropriately tender around incisions, no guarding, no rebound tenderness, no rigidity, negative CVA tenderness bilaterally   Extremities:  no calf tenderness bilaterally, non-edematous bilaterally, SCDs in place bilaterally and functioning    Musculoskeletal: no gross abnormalities, range of motion appropriate for age   Psychiatric: mood appropriate, normal affect   Pelvic Exam: not indicated     Admission on 2020   Component Date Value Ref Range Status    Color, UA [facility] [facility] [id number]  [address] [id number]     Hospital Outpatient Visit on 08/17/2020   Component Date Value Ref Range Status    Expiration Date 08/17/2020 08/27/2020,2359   Final    Arm Band Number 08/17/2020 [id number]   Final    ABO/Rh 08/17/2020 A POSITIVE   Final    Antibody Screen 08/17/2020 NEGATIVE   Final    WBC 08/17/2020 6.6  3.5 - 11.0 k/uL Final    RBC 08/17/2020 4.46  4.0 - 5.2 m/uL Final    Hemoglobin 08/17/2020 13.4  12.0 - 16.0 g/dL Final    Hematocrit 08/17/2020 40.5  36 - 46 % Final    MCV 08/17/2020 90.9  80 - 100 fL Final    MCH 08/17/2020 30.0  26 - 34 pg Final    MCHC 08/17/2020 33.0  31 - 37 g/dL Final    RDW 08/17/2020 13.6  11.5 - 14.9 % Final    Platelets 74/25/1423 176  150 - 450 k/uL Final    MPV 08/17/2020 8.7  6.0 - 12.0 fL Final    NRBC Automated 08/17/2020 NOT REPORTED  per 100 WBC Final    Differential Type 08/17/2020 NOT REPORTED   Final    Seg Neutrophils 08/17/2020 60  36 - 66 % Final    Lymphocytes 08/17/2020 29  24 - 44 % Final    Monocytes 08/17/2020 7  1 - 7 % Final    Eosinophils % 08/17/2020 3  0 - 4 % Final    Basophils 08/17/2020 1  0 - 2 % Final    Immature Granulocytes 08/17/2020 NOT REPORTED  0 % Final    Segs Absolute 08/17/2020 3.90  1.3 - 9.1 k/uL Final    Absolute Lymph # 08/17/2020 1.90  1.0 - 4.8 k/uL Final    Absolute Mono # 08/17/2020 0.50  0.1 - 1.3 k/uL Final    Absolute Eos # 08/17/2020 0.20  0.0 - 0.4 k/uL Final    Basophils Absolute 08/17/2020 0.10  0.0 - 0.2 k/uL Final    Absolute Immature Granulocyte 08/17/2020 NOT REPORTED  0.00 - 0.30 k/uL Final    WBC Morphology 08/17/2020 NOT REPORTED   Final    RBC Morphology 08/17/2020 NOT REPORTED   Final    Platelet Estimate 68/64/9745 NOT REPORTED   Final    Glucose 08/17/2020 103* 70 - 99 mg/dL Final    BUN 08/17/2020 16  8 - 23 mg/dL Final    CREATININE 08/17/2020 1.08* 0.50 - 0.90 mg/dL Final    Bun/Cre Ratio 08/17/2020 NOT REPORTED  9 - 20 Final    Calcium 08/17/2020 9.5  8.6 - 10.4 mg/dL Final    Sodium 08/17/2020 140  135 - 144 mmol/L Final    Potassium 08/17/2020 4.9  3.7 - 5.3 mmol/L Final    SPECIMEN SLIGHTLY HEMOLYZED, RESULTS MAY BE ADVERSELY AFFECTED.  Chloride 08/17/2020 108* 98 - 107 mmol/L Final    CO2 08/17/2020 23  20 - 31 mmol/L Final    Anion Gap 08/17/2020 9  9 - 17 mmol/L Final    GFR Non- 08/17/2020 50* >60 mL/min Final    GFR  08/17/2020 >60  >60 mL/min Final    GFR Comment 08/17/2020        Final    Comment: Average GFR for 79or more years old:   76 mL/min/1.73sq m  Chronic Kidney Disease:   <60 mL/min/1.73sq m  Kidney failure:   <15 mL/min/1.73sq m              eGFR calculated using average adult body mass. Additional eGFR calculator available at:        [employer]            GFR Staging 08/17/2020 NOT REPORTED   Final    Specimen Description 08/17/2020 . CLEAN CATCH URINE   Final    Special Requests 08/17/2020 NOT REPORTED   Final    Culture 08/17/2020 NO SIGNIFICANT GROWTH   Final    Color, UA 08/17/2020 YELLOW  YELLOW Final    Turbidity UA 08/17/2020 CLEAR  CLEAR Final    Glucose, Ur 08/17/2020 NEGATIVE  NEGATIVE Final    Bilirubin Urine 08/17/2020 NEGATIVE  NEGATIVE Final    Ketones, Urine 08/17/2020 NEGATIVE  NEGATIVE Final    Specific [place], UA 08/17/2020 1.024  1.000 - 1.030 Final    Urine Hgb 08/17/2020 NEGATIVE  NEGATIVE Final    pH, UA 08/17/2020 6.0  5.0 - 8.0 Final    Protein, UA 08/17/2020 NEGATIVE  NEGATIVE Final    Urobilinogen, Urine 08/17/2020 Normal  Normal Final    Nitrite, Urine 08/17/2020 NEGATIVE  NEGATIVE Final    Leukocyte Esterase, Urine 08/17/2020 SMALL* NEGATIVE Final    Urinalysis Comments 08/17/2020 NOT REPORTED   Final    Microalb, Ur 08/17/2020 37* <21 mg/L Final    Creatinine, Ur 08/17/2020 210.0  28.0 - 217.0 mg/dL Final    Microalb/Crt.  Ratio 08/17/2020 18  <25 mcg/mg creat Final    - 2020        Final    WBC, UA 2020 10 TO 20  /HPF Final    RBC, UA 2020 0 TO 2  /HPF Final    Casts UA 2020 NOT REPORTED  /LPF Final    Crystals, UA 2020 NOT REPORTED  None /HPF Final    Epithelial Cells UA 2020 5 TO 10  /HPF Final    Renal Epithelial, UA 2020 NOT REPORTED  0 /HPF Final    Bacteria, UA 2020 FEW* None Final    Mucus, UA 2020 NOT REPORTED  None Final    Trichomonas, UA 2020 NOT REPORTED  None Final    Amorphous, UA 2020 NOT REPORTED  None Final    Other Observations UA 2020 NOT REPORTED  NOT REQ. Final    Yeast, UA 2020 NOT REPORTED  None Final       Assessment/Plan:  Brie Case is a 79 y.o. female  POD #2 S/p Vaginal Cystocele Repair                 - Doing well, afebrile, vitals stable, bradycardiac with no symptoms              - Crowley and fluids discontinued yesterday              - Encourage ambulation and use of incentive spirometer               - Pain control: Tylenol              - Labs: Hgb stable  12.1              - DVT Proph: SCDs, Heparin BID               - Abx: S/p Ancef x 24 hr, Will start Cipro bid today and send home with 5d prescription              - Diet: General              - Path: benign              - Vaginal packing removed yesterday              - Continue post-op care.   - Please page/perfectserve OBGYN resident on call with any questions. - Anticipate discharge home today after clearance from [facility] and cardiology. All post op restrictions, discharge instructions, and return precautions discussed with patient. She is to follow up for post op visit in 2 weeks. Patient verbalized understanding.  All questions/concerns addressed with patient.       CKD Stage 3              - Pre-op BMP on 2020 showed baseline Cr 1.08              - Avoid nephrotoxic medications               - Urine output adequate since surgery     Asymptomatic Bradycardia - Vitals stable, bradycardiac, asymptomatic   - Family Medicine following and placed order for cardiology consult   - Patient is on continuous telemetry. Sinus bradycardia with hear rate in 40s to 50s              - Patient denies any symptoms including fatigue, lightheadedness, dizziness, chest pain, SOB, changes regarding swelling in bilateral lower extremities.                - Chart review showed patient has had low HR in past at office visits showing as low as 50bpm on 3/17/2020.               - EK2020 showed sinus bradycardia with no significant change from previous EKG on 2018              Patient Active Problem List    Diagnosis Date Noted    [place]-Walker grade 2-3 cystocele 2020     Priority: High    Overflow incontinence 2020     Priority: High    Post-operative state 2020    Vaginal Cystocele Repair 2020    Pancreatitis 2018    Osteoarthritis     Hypercholesterolemia     Hiatal hernia     CKD (chronic kidney disease) stage 3, GFR 30-59 ml/min ([facility])     Pure hypercholesterolemia 2018    OA (osteoarthritis) 10/28/2013    Fibromyalgia 10/28/2013    GERD (gastroesophageal reflux disease) 2012          Wetzel Bumpers, DO, PGY-1  Ob/Gyn Resident  [address]  20      Senior Residents Attestation Statement  I was present with the resident physician during the history and exam. I discussed the findings and plans with the resident physician and agree as documented in her note     Victor Hugo Wadsworth DO   OB/GYN Resident  PGY3  [facility]  2020, 7:15 AM

## 2020-08-25 NOTE — PROGRESS NOTES
Gynecology Rounds    POD# 1  Procedure: VAGINAL CYSTOCELE REPAIR    Date: 2020  Time: 10:18 AM      Patient Name: Soni Acharya  Patient : 1950  Room/Bed:   Admission Date/Time: 2020  6:23 AM  MRN #: [id number]  [facility] #: [id number]        Attending Physician Statement  I have discussed the care of Soni Acharya, including pertinent history and exam findings,  with the resident. I have reviewed their note in the electronic medical record. I have seen and examined the patient and the key elements of all parts of the encounter have been performed/reviewed by me . I agree with the assessment, plan and orders as documented by the resident. Pt without c/c. Pt denies vaginal bleeding/ spotting. Pt +flatus. Urine adequate/ clear. Awaiting PVR. Hg stable. Plan discharge home if PVR <150    Vitals:    20 0659   BP: (!) 114/43   Pulse: (!) 42   Resp: 16   Temp: 97.7 °F (36.5 °C)   SpO2: 97%       Admission on 2020   Component Date Value Ref Range Status    Color, UA 2020 YELLOW  YELLOW Final    Turbidity UA 2020 CLEAR  CLEAR Final    Glucose, Ur 2020 NEGATIVE  NEGATIVE Final    Bilirubin Urine 2020 Presumptive positive. Unable to confirm due to unavailability of reagent. * NEGATIVE Corrected    CORRECTED ON  AT 1151: PREVIOUSLY REPORTED AS SMALL AMOUNT    Ketones, Urine 2020 SMALL* NEGATIVE Final    Specific Gravity, UA 2020 1.020  1.000 - 1.030 Final    Urine Hgb 2020 NEGATIVE  NEGATIVE Final    pH, UA 2020 6.0  5.0 - 8.0 Final    Protein, UA 2020 TRACE* NEGATIVE Final    Urobilinogen, Urine 2020 Normal  Normal Final    Nitrite, Urine 2020 NEGATIVE  NEGATIVE Final    Leukocyte Esterase, Urine 2020 NEGATIVE  NEGATIVE Final    Urinalysis Comments 2020 NOT REPORTED   Final    - 2020        Final    WBC, UA 2020 2 TO 5  /HPF Final    RBC, UA 2020 0 TO 2  /HPF Final    Casts UA 08/24/2020 NOT REPORTED  /LPF Final    Crystals, UA 08/24/2020 NOT REPORTED  None /HPF Final    Epithelial Cells UA 08/24/2020 2 TO 5  /HPF Final    Renal Epithelial, UA 08/24/2020 NOT REPORTED  0 /HPF Final    Bacteria, UA 08/24/2020 FEW* None Final    Mucus, UA 08/24/2020 NOT REPORTED  None Final    Trichomonas, UA 08/24/2020 NOT REPORTED  None Final    Amorphous, UA 08/24/2020 NOT REPORTED  None Final    Other Observations UA 08/24/2020 NOT REPORTED  NOT REQ. Final    Yeast, UA 08/24/2020 NOT REPORTED  None Final    Hemoglobin 08/25/2020 12.1  12.0 - 16.0 g/dL Final    Hematocrit 08/25/2020 35.6* 36 - 46 % Final         [facility] Gyne[facility]  [facility] [address]  [phone]    Discharge Instructions for Cystocele and Rectocele Repair     A cystocele , sometimes called fallen bladder, occurs when part of the bladder bulges into the vagina. This occurs because of a weakening in the wall between the bladder and vagina. A rectocele occurs when part of the rectum bulges into the vagina. This is caused by a weakening in the wall between the rectum and the vagina. Recovery from this surgery takes about 1-2 weeks. Steps to Take   Home Care    · If you were given a general anesthetic, continue your deep breathing/coughing exercises as instructed in the hospital.    · If you went home with a urinary catheter in place, monitor your urine output. · Ask your doctor about when it is safe to shower, bathe, or soak in water. During the first four weeks after surgery, you may notice a bloody discharge from your vagina. Diet    · Return to your normal diet the same or next day after surgery, as directed by your doctor. Physical Activity    · Avoid heavy lifting for 6-8 weeks. · Follow your doctor's instructions for gradually increasing your activity. · Ask your doctor when you will be able to return to work and drive.     · Avoid sexual intercourse until your doctor has given you permission to do so. · Do not insert tampons or anything into your vagina for about a month. Medications    If you had to stop taking medications before the procedure, ask your doctor when you can resume taking them. Medications that are commonly stopped include:   · Anti-inflammatory drugs (eg, aspirin )   · Blood thinners, such as clopidogrel (Plavix) or warfarin (Coumadin)   You will likely get a prescription for pain medicine. When taking medications, it's important to:   · Take your medication as directed. Do not change the amount or the schedule. · Do not stop taking them without talking to your doctor. · Do not share them. · Know what the results and side effects. Report them to your doctor. · Some drugs can be dangerous when mixed. Talk to a doctor or pharmacist if you are taking more than one drug. This includes over-the-counter medication and herb or dietary supplements. · Plan ahead for refills so you don't run out. Lifestyle Changes    Typically, you will find that you have improved bladder and bowel control after surgery. Sexual intercourse may also be more comfortable. Follow-up   If you had rectocele repair alone, the medicated vaginal packing is usually left in the vagina overnight. The bladder catheter will be removed as soon as you are able to empty, usually before discharge from the hospital.   If you had cystocele repair, the medicated vaginal packing is also usually left in place overnight. But, the bladder catheter often needs to stay in longer (sometimes 2-6 days). This is to allow the bladder time to begin functioning normally again. · Schedule a follow-up appointment as directed by your doctor. Call Your Doctor If Any of the Following Occurs   Monitor your recovery once you leave the hospital. As soon as you have a problem, alert your doctor.  If any of the following occur, call your doctor:   · Signs of infection, including fever and chills   · Excessive bleeding, or any discharge from the incision site   · Unusually heavy vaginal bleeding, of foul-smeeling discharge from the vagina   · Nausea or vomiting   · Pain that you can't control with the medications you've been given   · Inability to pass urine into catheter   · Pain, burning, urgency, or frequency of urination, or persistent bleeding in the urine   · Cough, shortness of breath, or chest pain   In case of an emergency, call 911 immediately.          Home care, Restrictions & Follow up Care review completed    RTO 2 weeks          Attending's Name:  Electronically signed by Nai Loya DO on 8/25/2020 at 10:18 AM

## 2020-08-25 NOTE — PLAN OF CARE
Problem: Falls - Risk of:  Goal: Will remain free from falls  Description: Will remain free from falls  8/25/2020 1836 by Lilly Rae RN  Outcome: Ongoing     Problem: Falls - Risk of:  Goal: Absence of physical injury  Description: Absence of physical injury  8/25/2020 1836 by Lilly Rae RN  Outcome: Ongoing     Problem: Infection - Surgical Site:  Goal: Will show no infection signs and symptoms  Description: Will show no infection signs and symptoms  8/25/2020 1836 by Lilly Rae RN  Outcome: Ongoing

## 2020-08-25 NOTE — CONSULTS
Family Medicine Consultation/History and Physical      Name: Nahun Quintana  MRN: [id number]     Acct: [de-identified]  Room: 2045/2045-01    Admit Date: 8/24/2020  PCP: Kvng Rodríguez MD    Physician Requesting Consult: Dr. Koko Garcia    Reason for Consult: medical management    Chief Complaint:     Cystocele    History Obtained From:     non-family caregiver - nurse, electronic medical record    History of Present Illness:      Nahun Quintana is a  79 y.o.  female who presents with cystocele, s/p repair    Symptoms:  Onset:  Location:  pelvis  Duration:  week(s)  Severity:  moderate  Quality:  intermittent  Attempted Treatment:    Past Medical History:     Past Medical History:   Diagnosis Date    Anesthesia     woke up crying x1    Anesthesia complication     PATIENT WAKES UP CRYING AFTER ANESTHESIA    Cataract     BILATERAL EYES    CKD (chronic kidney disease) stage 3, GFR 30-59 ml/min ([facility]     Constipation     Fibromyalgia     Frequent stools     GERD (gastroesophageal reflux disease)     Hiatal hernia     High calcium levels     hx.  of , had\" lower left thyroid removed\"    History of blood transfusion 1990    AUTOLOGUS X1 AFTER HYSTERECTOMY    Hypercholesterolemia     OA (osteoarthritis) 10/28/2013    Osteoarthritis     Parathyroid adenoma     PONV (postoperative nausea and vomiting)     Slow urinary stream     Snores     Wears glasses         Past Surgical History:     Past Surgical History:   Procedure Laterality Date    APPENDECTOMY  1964    CARPAL TUNNEL RELEASE Bilateral     CHOLECYSTECTOMY      COLONOSCOPY      1.4.2011, 8.23.2011    CYST REMOVAL Left     GANGLION REMOVED 2 TIMES    ENDOSCOPY, COLON, DIAGNOSTIC  7/13/200512/20/2010    EGD    HYSTERECTOMY  1990    UTERUS ONLY (GURU)    PARATHYROIDECTOMY  09/04/2018    NM EXPLORE PARATHYROID GLANDS Left 9/4/2018    PARATHYROIDECTOMY LOWER, NIM MONITOR [facility] [address] [facility]    UPPER GASTROINTESTINAL ENDOSCOPY      7.13.2005, 12.20.2010    UPPER GASTROINTESTINAL ENDOSCOPY      VAGINA SURGERY N/A 8/24/2020    VAGINAL CYSTOCELE REPAIR performed by Rosy Thomas DO at [address]        Medications Prior to Admission:       Prior to Admission medications    Medication Sig Start Date End Date Taking? Authorizing Provider   HYDROcodone-acetaminophen (NORCO) 5-325 MG per tablet Take 1 tablet by mouth every 6 hours as needed for Pain for up to 3 days. 8/24/20 8/27/20 Yes Yary Ivan DO   docusate sodium (COLACE) 100 MG capsule Take 1 capsule by mouth 2 times daily as needed for Constipation 8/24/20 9/23/20 Yes Slick Gross DO   ciprofloxacin (CIPRO) 500 MG tablet Take 1 tablet by mouth 2 times daily for 5 days 8/24/20 8/29/20 Yes Slick Gross DO   omeprazole (PRILOSEC) 20 MG delayed release capsule Take 1 capsule by mouth twice daily 7/20/20  Yes Kobe Jama MD   estradiol (ESTRACE VAGINAL) 0.1 MG/GM vaginal cream Place 0.5 g vaginally 2 times daily 1/9/20  Yes TOI Sequeira NP   acetaminophen (TYLENOL) 500 MG tablet Take 1,000 mg by mouth 2 times daily as needed    Yes Historical Provider, MD   atorvastatin (LIPITOR) 10 MG tablet TAKE 1 TABLET BY MOUTH ONE TIME A DAY 6/17/20   TOI Fuentes CNP   DiphenhydrAMINE HCl (BENADRYL ALLERGY PO) Take 25 mg by mouth every 6 hours as needed Takes 1 -2  At a time    Historical Provider, MD   [address] [place] [address] It is my medical opinin that Steffany Reyna requires a disability parking placard for the following reasons:  She has limited walking ability due to an arthritic condition. Duration of need: permanent 3/26/18   TOI Potts CNP        Allergies:       Seasonal    Social History:     Tobacco:    reports that she quit smoking about 30 years ago. Her smoking use included cigarettes. She has a 40.00 pack-year smoking history.  She has never used smokeless tobacco.  Alcohol:      reports no history of alcohol use. Drug Use:  reports no history of drug use.     Family History:     Family History   Problem Relation Age of Onset    Diabetes Mother     Heart Disease Mother     High Blood Pressure Mother     Heart Disease Father     Diabetes Father         RESOLVED WITH WEIGHT LOSS    High Blood Pressure Father     Kidney Disease Father     Cancer Maternal Grandmother         colon, uterine, breast    Diabetes Maternal Grandmother        Review of Systems:     Positive and Negative as described in HPI    Constitutional:  negative for  fevers, chills, sweats, fatigue, and weight loss  HEENT:  negative for vision or hearing changes,   Respiratory:  negative for shortness of breath, cough, or congestion  Cardiovascular:  negative for  chest pain, palpitations  Gastrointestinal:  negative for nausea, vomiting, diarrhea, constipation, abdominal pain  Genitourinary:  negative for frequency, dysuria  Integument/Breast:  negative for rash, skin lesions  Musculoskeletal:  negative for muscle aches or joint pain  Neurological:  negative for headaches, dizziness, lightheadedness, numbness, pain and tingling extrimities  Behavior/Psych:  negative for depression and anxiety    Code Status:  Full Code    Physical Exam:     Vitals:  BP (!) 114/43   Pulse (!) 42   Temp 97.7 °F (36.5 °C) (Oral)   Resp 16   Ht 5' 5\" (1.651 m)   Wt 224 lb (101.6 kg)   SpO2 97%   BMI 37.28 kg/m²   Temp (24hrs), Av.7 °F (36.5 °C), Min:97.6 °F (36.4 °C), Max:97.7 °F (36.5 °C)      General appearance - alert, well appearing, and in no acute distress  Mental status - oriented to person, place, and time with normal affect  Head - normocephalic and atraumatic  Eyes - pupils equal and reactive, extraocular eye movements intact, conjunctiva clear  Ears - hearing appears to be intact  Nose - no drainage noted  Mouth - mucous membranes moist  Neck - supple, no carotid bruits, thyroid not palpable  Chest - clear to auscultation, normal effort  Heart - normal rate, regular rhythm, no murmurs  Abdomen - soft, mildly tender, nondistended, bowel sounds present all four quadrants, no masses, hepatomegaly or splenomegaly  Neurological - normal speech, no focal findings or movement disorder noted, cranial nerves II through XII grossly intact  Extremities - peripheral pulses palpable, no pedal edema or calf pain with palpation  Skin - no gross lesions, rashes, or induration noted    Osteopathic Examination: positive for  myalgias    Data:     [unfilled]    Assesment:     Primary Problem  <principal problem not specified>    Active Hospital Problems    Diagnosis Date Noted    [place]-Walker grade 2-3 cystocele [N81.10] 01/07/2020     Priority: High    Overflow incontinence [N39.490] 01/07/2020     Priority: High    Post-operative state [Z98.890] 08/24/2020    Vaginal Cystocele Repair 8/24/20 [N81.10] 08/24/2020    CKD (chronic kidney disease) stage 3, GFR 30-59 ml/min ([facility]) [N18.3]     OA (osteoarthritis) [M19.90] 10/28/2013    Fibromyalgia [M79.7] 10/28/2013    GERD (gastroesophageal reflux disease) [K21.9] 07/06/2012       Plan:     Orders Placed This Encounter   Procedures    Urinalysis Reflex to Culture    Surgical Pathology    Hemoglobin and Hematocrit, Blood    Microscopic Urinalysis    Surgical Pathology    DIET GENERAL;    Vital signs every 8 hours    Notify physician    Intake and output    Up as tolerated    Place intermittent pneumatic compression device    Misc nursing order (specify)    Place sequential compression device    Incentive spirometry nursing    Crowley catheter - discontinue    Remote cardiac monitor    Full Code    Inpatient consult to [facility] Inpatient consult to Cardiology    Initiate Oxygen Therapy Protocol    EKG 12 Lead    Saline lock IV    Transfer Patient    PATIENT STATUS (FROM ED OR OR/PROCEDURAL) Inpatient   1.        Electronically signed by Soni Rosado MD on 8/25/2020 at 10:05 AM     Copy sent to Dr. Krupa Vences MD

## 2020-08-25 NOTE — PLAN OF CARE
Problem: Falls - Risk of:  Goal: Will remain free from falls  Description: Will remain free from falls  Outcome: Ongoing  Goal: Absence of physical injury  Description: Absence of physical injury  Outcome: Ongoing     Patient has remained free from falls and absent from physical injury during this shift. Patient's bed is in the lowest position, call light within reach. Education given to call out when help needed. Problem: Infection - Surgical Site:  Goal: Will show no infection signs and symptoms  Description: Will show no infection signs and symptoms  Outcome: Ongoing     No new signs or symptoms of infection during this shift.  Patient educated regarding handwashing, etc.

## 2020-08-26 VITALS
DIASTOLIC BLOOD PRESSURE: 66 MMHG | WEIGHT: 238.1 LBS | HEIGHT: 65 IN | BODY MASS INDEX: 39.67 KG/M2 | OXYGEN SATURATION: 97 % | SYSTOLIC BLOOD PRESSURE: 121 MMHG | TEMPERATURE: 97.7 F | RESPIRATION RATE: 16 BRPM | HEART RATE: 55 BPM

## 2020-08-26 PROCEDURE — 93225 XTRNL ECG REC<48 HRS REC: CPT

## 2020-08-26 PROCEDURE — 96372 THER/PROPH/DIAG INJ SC/IM: CPT

## 2020-08-26 PROCEDURE — G0378 HOSPITAL OBSERVATION PER HR: HCPCS

## 2020-08-26 PROCEDURE — 93226 XTRNL ECG REC<48 HR SCAN A/R: CPT

## 2020-08-26 PROCEDURE — 6370000000 HC RX 637 (ALT 250 FOR IP): Performed by: STUDENT IN AN ORGANIZED HEALTH CARE EDUCATION/TRAINING PROGRAM

## 2020-08-26 PROCEDURE — 99225 PR SBSQ OBSERVATION CARE/DAY 25 MINUTES: CPT | Performed by: FAMILY MEDICINE

## 2020-08-26 PROCEDURE — 6360000002 HC RX W HCPCS: Performed by: STUDENT IN AN ORGANIZED HEALTH CARE EDUCATION/TRAINING PROGRAM

## 2020-08-26 RX ADMIN — DOCUSATE SODIUM 100 MG: 100 CAPSULE, LIQUID FILLED ORAL at 07:50

## 2020-08-26 RX ADMIN — HEPARIN SODIUM 5000 UNITS: 5000 INJECTION INTRAVENOUS; SUBCUTANEOUS at 07:50

## 2020-08-26 NOTE — PROGRESS NOTES
Scripts given to the patient. Discharge instructions given and read to the patient. Pt understands follow up appointments and new medication removed. IV removed.  No distress noted

## 2020-08-26 NOTE — PROGRESS NOTES
OB/GYN Resident Progress Note      Received perfect serve from RN that patient is cleared for discharge home from cardiology standpoint. Patient doing well from post op standpoint and stable for discharge home. All discharge instructions and restrictions reviewed with the patient this morning. F/u in 2 weeks with Dr. Angelique Darling. Discharge orders placed.      Vitals:    08/25/20 1256 08/25/20 1904 08/26/20 0544 08/26/20 0822   BP: 121/71 120/60  121/66   Pulse: 60 50  55   Resp: 18 18  16   Temp: 97.9 °F (36.6 °C) 97.9 °F (36.6 °C)  97.7 °F (36.5 °C)   TempSrc: Oral Oral  Oral   SpO2: 98% 95%  97%   Weight:   238 lb 1.6 oz (108 kg)    Height:               Peyton Villarreal DO  OB/GYN Resident  PGY3  [facility], [address]  8/26/2020, 11:53 AM

## 2020-08-26 NOTE — PROGRESS NOTES
Perfect serve to Dr Tiffanie Osorio regarding patient's request for Dr María Simpson being consulted. Patient states she told day shift earlier that she would want Dr María Simpson and instead Dr Lisa Orourke was consulted. Dr Tiffanie Osorio states that it is okay to switch consult.

## 2020-08-26 NOTE — PLAN OF CARE
Problem: Falls - Risk of:  Goal: Will remain free from falls  Description: Will remain free from falls  8/26/2020 0502 by Enoc Cool RN  Outcome: Ongoing  8/25/2020 1836 by Cristobal Denny RN  Outcome: Ongoing  Goal: Absence of physical injury  Description: Absence of physical injury  8/26/2020 0502 by Enoc Cool RN  Outcome: Ongoing  8/25/2020 1836 by Cristobal Denny RN  Outcome: Ongoing    Patient has remained free from falls and physical injury during this shift. Patient's bed is in lowest position, patient has been educated to call for help when needed. Problem: Infection - Surgical Site:  Goal: Will show no infection signs and symptoms  Description: Will show no infection signs and symptoms  8/26/2020 0502 by Enoc Cool RN  Outcome: Ongoing  8/25/2020 1836 by Cristobal Denny RN  Outcome: Ongoing    Patient has shown no new signs or symptoms of infection during this shift.

## 2020-08-26 NOTE — PROGRESS NOTES
Dr Corine Evans paged regarding new consult. 2050: Spoke with Dr Corine Evans regarding new consult. All questions answered. Dr states just to monitor patient. No new orders given at this time.      2051: Dr Ragland's group called to take patient off consult list.

## 2020-09-02 LAB
ACQUISITION DURATION: NORMAL S
AVERAGE HEART RATE: 52 BPM
HOOKUP DATE: NORMAL
HOOKUP TIME: NORMAL
MAX HEART RATE TIME/DATE: NORMAL
MAX HEART RATE: 96 BPM
MIN HEART RATE TIME/DATE: NORMAL
MIN HEART RATE: 35 BPM
NUMBER OF QRS COMPLEXES: NORMAL
NUMBER OF SUPRAVENTRICULAR BEATS IN RUNS: 0
NUMBER OF SUPRAVENTRICULAR COUPLETS: 8
NUMBER OF SUPRAVENTRICULAR ECTOPICS: 350
NUMBER OF SUPRAVENTRICULAR ISOLATED BEATS: 334
NUMBER OF SUPRAVENTRICULAR RUNS: 0
NUMBER OF VENTRICULAR BEATS IN RUNS: 0
NUMBER OF VENTRICULAR BIGEMINAL CYCLES: 0
NUMBER OF VENTRICULAR COUPLETS: 0
NUMBER OF VENTRICULAR ECTOPICS: 1
NUMBER OF VENTRICULAR ISOLATED BEATS: 1
NUMBER OF VENTRICULAR RUNS: 0

## 2020-09-10 ENCOUNTER — OFFICE VISIT (OUTPATIENT)
Dept: OBGYN CLINIC | Age: 70
End: 2020-09-10

## 2020-09-10 VITALS
TEMPERATURE: 98 F | HEIGHT: 65 IN | SYSTOLIC BLOOD PRESSURE: 130 MMHG | BODY MASS INDEX: 37.32 KG/M2 | DIASTOLIC BLOOD PRESSURE: 80 MMHG | HEART RATE: 61 BPM | WEIGHT: 224 LBS

## 2020-09-10 PROBLEM — R00.1 BRADYCARDIA: Status: ACTIVE | Noted: 2020-09-10

## 2020-09-10 PROCEDURE — 99024 POSTOP FOLLOW-UP VISIT: CPT | Performed by: OBSTETRICS & GYNECOLOGY

## 2020-09-10 NOTE — PROGRESS NOTES
Tita Gee  9/10/2020  11:37 AM      Tita العراقي  Procedure:   PROCEDURE DATE: 2020      Pre-op Diagnosis: CYSTOCELE Grade 2 with Overflow incontinence     Post-op Diagnosis: Same;      Procedure: Procedure(s):  VAGINAL CYSTOCELE Gerardo Portillo is a 79 y.o. female       The patient was seen, she denies any complaints. She denied any shortness of breath, chest pain or dizziness. She denied any nausea, vomiting, or diarrhea. There is no fever, chills, or rigors. The patient denies any vaginal bleeding, discharge or odor. All of her pre-operative complaints are now resolved. Blood pressure 130/80, pulse 61, temperature 98 °F (36.7 °C), height 5' 5\" (1.651 m), weight 224 lb (101.6 kg), not currently breastfeeding. Abdominal Exam: soft non-tender. Good bowel sounds. No guarding, rebound or rigidity. No costal vertebral angle tenderness bilateral. No hernias    Incision: na    Extremities: No edema or calf pain noted bilaterally. Pelvic Exam:declined      Results for orders placed or performed during the hospital encounter of 20   Urinalysis Reflex to Culture    Specimen: Urine, indwelling catheter   Result Value Ref Range    Color, UA YELLOW YELLOW    Turbidity UA CLEAR CLEAR    Glucose, Ur NEGATIVE NEGATIVE    Bilirubin Urine (A) NEGATIVE     Presumptive positive. Unable to confirm due to unavailability of reagent.     Ketones, Urine SMALL (A) NEGATIVE    Specific Gravity, UA 1.020 1.000 - 1.030    Urine Hgb NEGATIVE NEGATIVE    pH, UA 6.0 5.0 - 8.0    Protein, UA TRACE (A) NEGATIVE    Urobilinogen, Urine Normal Normal    Nitrite, Urine NEGATIVE NEGATIVE    Leukocyte Esterase, Urine NEGATIVE NEGATIVE    Urinalysis Comments NOT REPORTED    Microscopic Urinalysis   Result Value Ref Range    -          WBC, UA 2 TO 5 /HPF    RBC, UA 0 TO 2 /HPF    Casts UA NOT REPORTED /LPF    Crystals, UA NOT REPORTED None /HPF    Epithelial Cells UA 2 TO 5 /HPF    Renal Epithelial, UA NOT REPORTED 0 /HPF    Bacteria, UA FEW (A) None    Mucus, UA NOT REPORTED None    Trichomonas, UA NOT REPORTED None    Amorphous, UA NOT REPORTED None    Other Observations UA NOT REPORTED NOT REQ. Yeast, UA NOT REPORTED None   Surgical Pathology   Result Value Ref Range    Surgical Pathology Report       [id number]  [address]  [address] [place], [address] [facility]  [phone]  Fax: [phone]  SURGICAL PATHOLOGY REPORT    Patient Name: Ronel Hall  MR#: [id number]  Specimen #[id number]       Final Diagnosis  VAGINAL MUCOSA, REPAIR:         SQUAMOUS MUCOSA WITH PATCHY PARAKERATOSIS       CHUY Mclean.  **Electronically Signed Out**         [facility]/8/25/2020    Clinical Information  Cystocele, vaginal cystocele repair, formalin time 09:16    Source:  A: Vaginal mucosa    Gross Description  The specimen is received in formalin labeled with the patient's name  designated at \"vaginal mucosa\". It consists of two, slender, 3.2 x  0.9 x 0.4 cm and 3.2 x 0.8 x 0.3 cm ellipses of pink mucosa. The  mucosa is slightly roughened to smooth. Each segment is bisected  longitudinally and the specimen is totally submitted in one cassette. SPF/cj    Microscopic Description  One H&E slide reviewed. Microscopic examination confirms the above  final diagnosis.          Hemoglobin and Hematocrit, Blood   Result Value Ref Range    Hemoglobin 12.1 12.0 - 16.0 g/dL    Hematocrit 35.6 (L) 36 - 46 %   Holter Monitor 24 Hour   Result Value Ref Range    Hookup Date Aug 26 2020      Hookup Time 11:51:00     Acquisition Duration 22673 S    Number Of QRS Complexes 57883     Number Of Ventricular Ectopics 1     Number Of Ventricular Isolated Beats 1     Number Of Ventricular Bigeminal Cycles 0     Number Of Ventricular Couplets 0     Number Of Ventricular Runs 0     Number Of Ventricular Beats In Runs 0     Number Of Superventricular Ectopics 350     Number Of Suptaventricular Isolated Beats 334 Number Of Supraventricular Couplets 8     Number Of Supraventricular Runs 0     Number Of Supraventricular Beats In Runs 0     Average Heart Rate 52 BPM    Max Heart Rate 96 BPM    Min Heart Rate 35 BPM    Max Heart Rate Time/Date Aug 26 2020 14:07:11     Min Heart Rate Time/Date Aug 27 2020 07:05:03            Assessment:      Diagnosis Orders   1. Postop check     2. Vaginal Cystocele Repair 8/24/20     3. Bradycardia          Patient Active Problem List    Diagnosis Date Noted    [place]-Walker grade 2-3 cystocele 01/07/2020     Priority: High    Overflow incontinence 01/07/2020     Priority: High    CKD (chronic kidney disease) stage 3, GFR 30-59 ml/min ([facility])      Priority: Medium    OA (osteoarthritis) 10/28/2013     Priority: Medium    GERD (gastroesophageal reflux disease) 07/06/2012     Priority: Medium    Bradycardia 09/10/2020    Post-operative state 08/24/2020    Vaginal Cystocele Repair 8/24/20 08/24/2020    Pancreatitis 09/12/2018    Osteoarthritis     Hypercholesterolemia     Hiatal hernia     Pure hypercholesterolemia 04/13/2018    Fibromyalgia 10/28/2013          POD# 17   Procedure: Cystocele repair   Stable   Pathology reviewed and found to be benign. Yes    Plan:   Return in about 4 weeks (around 10/8/2020) for Follow-Up On Current Problem, Post Operative Evaluation. Continue with restrictions. Pelvic rest. No lifting or intercourse. No baths or pools. No douching or tampons. Return to office 4-6 weeks  Seen by PCP and cardio for Bradycardia dx in hospital. Had a 24 hr holter completed per pt.

## 2020-09-23 PROBLEM — Z98.890 POST-OPERATIVE STATE: Status: RESOLVED | Noted: 2020-08-24 | Resolved: 2020-09-23

## 2020-10-20 ENCOUNTER — OFFICE VISIT (OUTPATIENT)
Dept: OBGYN CLINIC | Age: 70
End: 2020-10-20

## 2020-10-20 VITALS
TEMPERATURE: 98.7 F | HEIGHT: 65 IN | WEIGHT: 224 LBS | SYSTOLIC BLOOD PRESSURE: 132 MMHG | HEART RATE: 63 BPM | DIASTOLIC BLOOD PRESSURE: 82 MMHG | BODY MASS INDEX: 37.32 KG/M2

## 2020-10-20 PROCEDURE — 99024 POSTOP FOLLOW-UP VISIT: CPT | Performed by: OBSTETRICS & GYNECOLOGY

## 2020-10-20 NOTE — PROGRESS NOTES
Tita March  10/20/2020      Tita Fry is a 79 y.o. female       The patient was seen today. She was here to follow-up regarding her fu for premarin cream postop and restrictions from her surgery for the diagnosis of:    ICD-10-CM    1. Vaginal Cystocele Repair 20  N81.10    2. Overflow incontinence  N39.490        She does not have any specific chief complaint today. Her bowels are regular and she is voiding without difficulty. Overflow incontinence has resolved      Past Medical History:   Diagnosis Date    Anesthesia     woke up crying x1    Anesthesia complication     PATIENT WAKES UP CRYING AFTER ANESTHESIA    Cataract     BILATERAL EYES    CKD (chronic kidney disease) stage 3, GFR 30-59 ml/min     Constipation     Fibromyalgia     Frequent stools     GERD (gastroesophageal reflux disease)     Hiatal hernia     High calcium levels     hx.  of , had\" lower left thyroid removed\"    History of blood transfusion     AUTOLOGUS X1 AFTER HYSTERECTOMY    Hypercholesterolemia     OA (osteoarthritis) 10/28/2013    Osteoarthritis     Parathyroid adenoma     PONV (postoperative nausea and vomiting)     Slow urinary stream     Snores     Wears glasses          Past Surgical History:   Procedure Laterality Date    APPENDECTOMY  1964    CARPAL TUNNEL RELEASE Bilateral     CHOLECYSTECTOMY      COLONOSCOPY      1.4., 8.    CYST REMOVAL Left     GANGLION REMOVED 2 TIMES    ENDOSCOPY, COLON, DIAGNOSTIC      EGD    HYSTERECTOMY      UTERUS ONLY (GURU)    PARATHYROIDECTOMY  2018    VT EXPLORE PARATHYROID GLANDS Left 2018    PARATHYROIDECTOMY LOWER, NIM MONITOR performed by Hector Carvajal MD at [address] [facility],[address] ENDOSCOPY      2005, 2010    UPPER GASTROINTESTINAL ENDOSCOPY      VAGINA SURGERY N/A 2020    VAGINAL CYSTOCELE REPAIR performed by Calixto Dodd DO at [address] OR         Family History   Problem Relation Age of Onset    Diabetes Mother     Heart Disease Mother     High Blood Pressure Mother     Heart Disease Father     Diabetes Father         RESOLVED WITH WEIGHT LOSS    High Blood Pressure Father     Kidney Disease Father     Cancer Maternal Grandmother         colon, uterine, breast    Diabetes Maternal Grandmother          Social History     Tobacco Use    Smoking status: Former Smoker     Packs/day: 2.00     Years: 20.00     Pack years: 40.00     Types: Cigarettes     Last attempt to quit: 1990     Years since quittin.2    Smokeless tobacco: Never Used   Substance Use Topics    Alcohol use: No    Drug use: No         MEDICATIONS:  Current Outpatient Medications   Medication Sig Dispense Refill    omeprazole (PRILOSEC) 20 MG delayed release capsule Take 1 capsule by mouth twice daily 180 capsule 0    atorvastatin (LIPITOR) 10 MG tablet Take 1 tablet by mouth once daily 90 tablet 0    estradiol (ESTRACE VAGINAL) 0.1 MG/GM vaginal cream Place 0.5 g vaginally 2 times daily 1 Tube 3    acetaminophen (TYLENOL) 500 MG tablet Take 1,000 mg by mouth 2 times daily as needed       DiphenhydrAMINE HCl (BENADRYL ALLERGY PO) Take 25 mg by mouth every 6 hours as needed Takes 1 -2  At a time      Handicap Placard [facility] It is my medical opinin that [employer] requires a disability parking placard for the following reasons:  She has limited walking ability due to an arthritic condition. Duration of need: permanent 1 each 0     No current facility-administered medications for this visit. ALLERGIES:  Allergies as of 10/20/2020 - Review Complete 10/20/2020   Allergen Reaction Noted    Seasonal Other (See Comments) 2018         Blood pressure 132/82, pulse 63, temperature 98.7 °F (37.1 °C), height 5' 5\" (1.651 m), weight 224 lb (101.6 kg), not currently breastfeeding. Abdomen: Soft non-tender; good bowel sounds.  No guarding, rebound or rigidity. No CVA tenderness bilaterally. Extremities: No calf tenderness, DTR 2/4, and No edema bilaterally    Pelvic: Requested   Digital exam noted a completed healed site without any cystocele      Diagnostics:  No results found. Lab Results:  Results for orders placed or performed during the hospital encounter of 08/24/20   Urinalysis Reflex to Culture    Specimen: Urine, indwelling catheter   Result Value Ref Range    Color, UA YELLOW YELLOW    Turbidity UA CLEAR CLEAR    Glucose, Ur NEGATIVE NEGATIVE    Bilirubin Urine (A) NEGATIVE     Presumptive positive. Unable to confirm due to unavailability of reagent. Ketones, Urine SMALL (A) NEGATIVE    Specific Gravity, UA 1.020 1.000 - 1.030    Urine Hgb NEGATIVE NEGATIVE    pH, UA 6.0 5.0 - 8.0    Protein, UA TRACE (A) NEGATIVE    Urobilinogen, Urine Normal Normal    Nitrite, Urine NEGATIVE NEGATIVE    Leukocyte Esterase, Urine NEGATIVE NEGATIVE    Urinalysis Comments NOT REPORTED    Microscopic Urinalysis   Result Value Ref Range    -          WBC, UA 2 TO 5 /HPF    RBC, UA 0 TO 2 /HPF    Casts UA NOT REPORTED /LPF    Crystals, UA NOT REPORTED None /HPF    Epithelial Cells UA 2 TO 5 /HPF    Renal Epithelial, UA NOT REPORTED 0 /HPF    Bacteria, UA FEW (A) None    Mucus, UA NOT REPORTED None    Trichomonas, UA NOT REPORTED None    Amorphous, UA NOT REPORTED None    Other Observations UA NOT REPORTED NOT REQ. Yeast, UA NOT REPORTED None   Surgical Pathology   Result Value Ref Range    Surgical Pathology Report       [id number]  [address]   [facility].  [phone]  Fax: [phone]  SURGICAL PATHOLOGY REPORT    Patient Name: Valentina Navarrete  MR#: [id number]  Specimen #[id number]       Final Diagnosis  VAGINAL MUCOSA, REPAIR:         SQUAMOUS MUCOSA WITH PATCHY PARAKERATOSIS       CHUY Flores.  **Electronically Signed Out**         raffaele/8/25/2020    Clinical Information  Cystocele, vaginal cystocele repair, formalin time 09:16    Source:  A: Vaginal mucosa    Gross Description  The specimen is received in formalin labeled with the patient's name  designated at [employer] [facility]". It consists of two, slender, 3.2 x  0.9 x 0.4 cm and 3.2 x 0.8 x 0.3 cm ellipses of pink mucosa. The  mucosa is slightly roughened to smooth. Each segment is bisected  longitudinally and the specimen is totally submitted in one cassette. SPF/cj    Microscopic Description  One H&E slide reviewed. Microscopic examination confirms the above  final diagnosis. Hemoglobin and Hematocrit, Blood   Result Value Ref Range    Hemoglobin 12.1 12.0 - 16.0 g/dL    Hematocrit 35.6 (L) 36 - 46 %   Holter Monitor 24 Hour   Result Value Ref Range    Hookup Date Aug 26 2020      Hookup Time 11:51:00     Acquisition Duration 62220 S    Number Of QRS Complexes 43707     Number Of Ventricular Ectopics 1     Number Of Ventricular Isolated Beats 1     Number Of Ventricular Bigeminal Cycles 0     Number Of Ventricular Couplets 0     Number Of Ventricular Runs 0     Number Of Ventricular Beats In Runs 0     Number Of Superventricular Ectopics 350     Number Of Suptaventricular Isolated Beats 334     Number Of Supraventricular Couplets 8     Number Of Supraventricular Runs 0     Number Of Supraventricular Beats In Runs 0     Average Heart Rate 52 BPM    Max Heart Rate 96 BPM    Min Heart Rate 35 BPM    Max Heart Rate Time/Date Aug 26 2020 14:07:11     Min Heart Rate Time/Date Aug 27 2020 07:05:03            Assessment:   Diagnosis Orders   1. Vaginal Cystocele Repair 8/24/20     2.  Overflow incontinence       Chief Complaint   Patient presents with    Post-Op Check         Patient Active Problem List    Diagnosis Date Noted    [place] grade 2-3 cystocele 01/07/2020     Priority: High    Overflow incontinence 01/07/2020     Priority: High    CKD (chronic kidney disease) stage 3, GFR 30-59 ml/min ([facility])      Priority: Medium    OA (osteoarthritis) 10/28/2013     Priority: Medium    GERD (gastroesophageal reflux disease) 07/06/2012     Priority: Medium    Bradycardia 09/10/2020    Vaginal Cystocele Repair 8/24/20 08/24/2020    Pancreatitis 09/12/2018    Osteoarthritis     Hypercholesterolemia     Hiatal hernia     Pure hypercholesterolemia 04/13/2018    Fibromyalgia 10/28/2013       PLAN:  Return for Annual.  Symptoms preop completely resolved  Return to the office annual  C/w premarin cream 2-3 x weekly  Counseled on preventative health maintenance follow-up. No orders of the defined types were placed in this encounter. Patient was seen with total face to face time of 15 minutes. More than 50% of this visit was counseling and education regarding The primary encounter diagnosis was Vaginal Cystocele Repair 8/24/20. A diagnosis of Overflow incontinence was also pertinent to this visit. and Post-Op Check   as well as  counseling on preventative health maintenance follow-up.

## 2021-03-09 ENCOUNTER — OFFICE VISIT (OUTPATIENT)
Dept: OBGYN CLINIC | Age: 71
End: 2021-03-09
Payer: MEDICARE

## 2021-03-09 VITALS
WEIGHT: 222.4 LBS | DIASTOLIC BLOOD PRESSURE: 80 MMHG | SYSTOLIC BLOOD PRESSURE: 144 MMHG | BODY MASS INDEX: 35.74 KG/M2 | HEART RATE: 68 BPM | HEIGHT: 66 IN | TEMPERATURE: 98.1 F

## 2021-03-09 DIAGNOSIS — Z01.419 WELL WOMAN EXAM: Primary | ICD-10-CM

## 2021-03-09 DIAGNOSIS — Z12.31 ENCOUNTER FOR SCREENING MAMMOGRAM FOR MALIGNANT NEOPLASM OF BREAST: ICD-10-CM

## 2021-03-09 DIAGNOSIS — N64.9 BREAST LESION: Primary | ICD-10-CM

## 2021-03-09 DIAGNOSIS — N64.9 BREAST LESION: ICD-10-CM

## 2021-03-09 DIAGNOSIS — Z78.0 POSTMENOPAUSAL: ICD-10-CM

## 2021-03-09 PROCEDURE — 99397 PER PM REEVAL EST PAT 65+ YR: CPT | Performed by: NURSE PRACTITIONER

## 2021-03-09 ASSESSMENT — PATIENT HEALTH QUESTIONNAIRE - PHQ9
SUM OF ALL RESPONSES TO PHQ9 QUESTIONS 1 & 2: 0
SUM OF ALL RESPONSES TO PHQ QUESTIONS 1-9: 0

## 2021-03-09 NOTE — PROGRESS NOTES
History and Physical  [address], [address] [address], [address] [address] [phone]   Fax [phone]  Won Sommers  3/9/2021              79 y.o. Chief Complaint   Patient presents with    Gynecologic Exam       No LMP recorded. Patient has had a hysterectomy. Primary Care Physician: Jolanta Forrest MD    The patient was seen and examined. She has no chief complaint today and is here for her annual exam.  Her bowels are regular. There are no voiding complaints. She denies any bloating. She denies vaginal discharge and was counseled on STD's and the need for barrier contraception. HPI : Won Sommers is a 79 y.o. female   Annual well visit   Patient complaining of areas on right nipple that itch and are raised. Started about 2 months ago. Has tried Gold Bond lotion on it. Did not help. Hyst for benign disease (heavy periods). ________________________________________________________________________  OB History    Para Term  AB Living   1 1 1 0 0 1   SAB TAB Ectopic Molar Multiple Live Births   0 0 0 0 0 1      # Outcome Date GA Lbr Chang/2nd Weight Sex Delivery Anes PTL Lv   1 Term 5    F Vag-Spont   PANDA     Past Medical History:   Diagnosis Date    Anesthesia     woke up crying x1    Anesthesia complication     PATIENT WAKES UP CRYING AFTER ANESTHESIA    Cataract     BILATERAL EYES    CKD (chronic kidney disease) stage 3, GFR 30-59 ml/min     Constipation     Fibromyalgia     Frequent stools     GERD (gastroesophageal reflux disease)     Hiatal hernia     High calcium levels     hx.  of , had\" lower left thyroid removed\"    History of blood transfusion     AUTOLOGUS X1 AFTER HYSTERECTOMY    Hypercholesterolemia     OA (osteoarthritis) 10/28/2013    Osteoarthritis     Parathyroid adenoma     PONV (postoperative nausea and vomiting)     Slow urinary stream     Snores     Wears glasses Past Surgical History:   Procedure Laterality Date    APPENDECTOMY  1964    CARPAL TUNNEL RELEASE Bilateral     CHOLECYSTECTOMY      COLONOSCOPY      1.4., 8..    CYST REMOVAL Left     GANGLION REMOVED 2 TIMES    ENDOSCOPY, COLON, DIAGNOSTIC      EGD    HYSTERECTOMY      UTERUS ONLY (GURU)    PARATHYROIDECTOMY  2018    [place] EXPLORE PARATHYROID GLANDS Left 2018    PARATHYROIDECTOMY LOWER, NIM MONITOR performed by Manoj Veras MD at [address] [place] [address],[address] ENDOSCOPY      2005, 2010    UPPER GASTROINTESTINAL ENDOSCOPY      VAGINA SURGERY N/A 2020    VAGINAL CYSTOCELE REPAIR performed by Odilon Coelho DO at [facility] History   Problem Relation Age of Onset    Diabetes Mother     Heart Disease Mother     High Blood Pressure Mother     Heart Disease Father     Diabetes Father         RESOLVED WITH WEIGHT LOSS    High Blood Pressure Father     Kidney Disease Father     Cancer Maternal Grandmother         colon, uterine, breast    Diabetes Maternal Grandmother      Social History     Socioeconomic History    Marital status:      Spouse name: Not on file    Number of children: Not on file    Years of education: Not on file    Highest education level: Not on file   Occupational History    Not on file   Social Needs    Financial resource strain: Not on file    Food insecurity     Worry: Not on file     Inability: Not on file    Transportation needs     Medical: Not on file     Non-medical: Not on file   Tobacco Use    Smoking status: Former Smoker     Packs/day: 2.00     Years: 20.00     Pack years: 40.00     Types: Cigarettes     Quit date: 1990     Years since quittin.6    Smokeless tobacco: Never Used   Substance and Sexual Activity    Alcohol use: No    Drug use: No    Sexual activity: Not on file Lifestyle    Physical activity     Days per week: Not on file     Minutes per session: Not on file    Stress: Not on file   Relationships    Social connections     Talks on phone: Not on file     Gets together: Not on file     Attends Synagogue service: Not on file     Active member of club or organization: Not on file     Attends meetings of clubs or organizations: Not on file     Relationship status: Not on file    Intimate partner violence     Fear of current or ex partner: Not on file     Emotionally abused: Not on file     Physically abused: Not on file     Forced sexual activity: Not on file   Other Topics Concern    Not on file   Social History Narrative    Not on file       MEDICATIONS:  Current Outpatient Medications   Medication Sig Dispense Refill    omeprazole (PRILOSEC) 20 MG delayed release capsule Take 1 capsule by mouth twice daily 180 capsule 0    atorvastatin (LIPITOR) 10 MG tablet Take 1 tablet by mouth once daily 90 tablet 0    vitamin D (ERGOCALCIFEROL) 1.25 MG (13762 UT) CAPS capsule Take 1 capsule by mouth once a week 12 capsule 1    estradiol (ESTRACE VAGINAL) 0.1 MG/GM vaginal cream Place 0.5 g vaginally 2 times daily 1 Tube 3    acetaminophen (TYLENOL) 500 MG tablet Take 1,000 mg by mouth 2 times daily as needed       DiphenhydrAMINE HCl (BENADRYL ALLERGY PO) Take 25 mg by mouth every 6 hours as needed Takes 1 -2  At a time      Handicap Placard [employer] It is my medical opinin that [employer] requires a disability parking placard for the following reasons:  She has limited walking ability due to an arthritic condition. Duration of need: permanent 1 each 0     No current facility-administered medications for this visit.             ALLERGIES:  Allergies as of 03/09/2021 - Review Complete 03/09/2021   Allergen Reaction Noted    Seasonal Other (See Comments) 08/28/2018       Symptoms of decreased mood absent  Symptoms of anhedonia absent    **If either question is answered in a ROS: No Chest Pain with Exertion, Palpitations, Syncope, Edema, Arrhythmia. +hyperlipidemia. Gastrointestinal ROS: No Indigestion, Heartburn, Nausea, vomiting, Diarrhea, Constipation,or Bowel Changes; No Bloody Stools or melena  Genito-Urinary ROS: No Dysuria, Hematuria or Nocturia. No Urinary Incontinence or Vaginal Discharge  Musculoskeletal ROS: No Arthralgia, Arthritis,Gout,Osteoporosis or Rheumatism  Neurological ROS: No CVA, Migraines, Epilepsy, Seizure Hx, or Limb Weakness  Dermatological ROS: No Rash, Itching, Hives, Mole Changes or Cancer                                                                                                                                                                                                                                  PHYSICAL Exam:     Constitutional:  Vitals:    03/09/21 1107   BP: (!) 144/80   Site: Left Upper Arm   Position: Sitting   Cuff Size: Medium Adult   Pulse: 68   Temp: 98.1 °F (36.7 °C)   TempSrc: Temporal   Weight: 222 lb 6.4 oz (100.9 kg)   Height: 5' 5.5\" (1.664 m)       Chaperone for Intimate Exam   Chaperone was offered and accepted as part of the rooming process.  Chaperone: Kathie          General Appearance: This  is a well Developed, well Nourished, well groomed female. Her BMI was reviewed. Nutritional decision making was discussed. Skin:   There was a Normal Inspection of the skin without rashes or lesions. There were no rashes. (Papular, Maculopapular, Hives, Pustular, Macular)     There were no lesions (Ulcers, Erythema, Abn. Appearing Nevi)            Lymphatic:  No Lymph Nodes were Palpable in the neck , axilla or groin.  0 # Of Lymph Nodes; Location ; Character [Normal]  [Shotty] [Tender] [Enlarged]     Neck and EENT:  The neck was supple. There were no masses   The thyroid was not enlarged and had no masses. Perrla, EOMI B/L, TMI B/L No Abnormalities.    Throat inspected-No exudates or Masses, Nares Patent No Masses        Respiratory: The lungs were auscultated and found to be clear. There were no rales, rhonchi or wheezes. There was a good respiratory effort. Cardiovascular: The heart was in a regular rate and rhythm. . No S3 or S4. There was no murmur appreciated. Location, grade, and radiation are not applicable. Extremities: The patients extremities were without calf tenderness, edema, or varicosities. There was full range of motion in all four extremities. Pulses in all four extremities were appreciated and are 2/4. Abdomen: The abdomen was soft and non-tender. There were good bowel sounds in all quadrants and there was no guarding, rebound or rigidity. On evaluation there was no evidence of hepatosplenomegaly and there was no costal vertebral vanessa tenderness bilaterally. No hernias were appreciated. Abdominal Scars: intact    Psych: The patient had a normal Orientation to: Time, Place, Person, and Situation  There is no Mood / Affect changes    Breast:  (Chest)  normal appearance, no masses or tenderness, No nipple retraction or dimpling, No axillary or supraclavicular adenopathy. 2 round raised, reddened areas noted at 11 o'clock on right nipple. 1-2 cm in diameter. Self breast exams were reviewed in detail. Literature was given. Pelvic Exam:  Vulva and vagina appear normal. Bimanual exam reveals normal cuff intact    Rectal Exam:  exam declined by patient          Musculosk:  Normal Gait and station was noted. Digits were evaluated without abnormal findings. Range of motion, stability and strength were evaluated and found to be appropriate for the patients age. ASSESSMENT:      79 y.o. Annual   Diagnosis Orders   1. Well woman exam     2. Encounter for screening mammogram for malignant neoplasm of breast  [facility] DIGITAL DIAGNOSTIC W OR WO CAD BILATERAL    US BREAST COMPLETE RIGHT   3. Postmenopausal  DEXA BONE DENSITY AXIAL SKELETON   4.  Breast lesion  [facility] DIGITAL no fracture history or osteoporosis family history. (significant). 4. Colonoscopy begin at 40 yo. Repeat every ten years if negative and no family history. 5. Calcium of 4270-1983 mg/day in split dosing  6. Vitamin D 400-800 IU/day  7. All other preventative health recommendations will be managed by the patients Primary care physician. PLAN:  Return in about 1 year (around 3/9/2022) for annual.   Recommend a daily calcium and vitamin D supplement  Increase calcium and vitamin D sources. Diagnostic mammogram and right breast ultrasound ordered. Referral to dermatologist.  Sonya Perez ordered. Repeat Annual every 1 year  Cervical Cytology Evaluation begins at 24years old. If Negative Cytology, Follow-up screening per current guidelines. Mammograms every 1 year. If 35 yo and last mammogram was negative. Calcium and Vitamin D dosing reviewed. Colonoscopy screening reviewed as well as onset for bone density testing. Birth control and barrier recommendations discussed. STD counseling and prevention reviewed. Gardisil counseling completed for all patients 10-35 yo. Routine health maintenance per patients PCP.   Orders Placed This Encounter   Procedures    DEXA BONE DENSITY AXIAL SKELETON     Standing Status:   Future     Standing Expiration Date:   3/8/2022     Order Specific Question:   Reason for exam:     Answer:   Screening for Osteopenia    [facility] DIGITAL DIAGNOSTIC W OR WO CAD BILATERAL     Standing Status:   Future     Standing Expiration Date:   5/9/2022     Order Specific Question:   Reason for exam:     Answer:   screening for breast cancer, breast lesion on right nipple    US BREAST COMPLETE RIGHT     Standing Status:   Future     Standing Expiration Date:   3/9/2022     Order Specific Question:   Reason for exam:     Answer:   screening for breast cancer, right breast lesion on nipple           The patient, Kenneth Dominique is a 79 y.o. female, was seen with a total time spent of 20 minutes for the visit on this date of service by the E/M provider. The time component had both face to face and non face to face time spent in determining the total time component. Counseling and education regarding her diagnosis listed below and her options regarding those diagnoses were also included in determining her time component. Diagnosis Orders   1. Well woman exam     2. Encounter for screening mammogram for malignant neoplasm of breast  [facility] DIGITAL DIAGNOSTIC W OR WO CAD BILATERAL    US BREAST COMPLETE RIGHT   3. Postmenopausal  DEXA BONE DENSITY AXIAL SKELETON   4. Breast lesion  [facility] DIGITAL DIAGNOSTIC W OR WO CAD BILATERAL    US BREAST COMPLETE RIGHT        The patient had her preventative health maintenance recommendations and follow-up reviewed with her at the completion of her visit.

## 2021-03-16 DIAGNOSIS — R21 SKIN RASH: Primary | ICD-10-CM

## 2021-04-15 ENCOUNTER — HOSPITAL ENCOUNTER (OUTPATIENT)
Dept: WOMENS IMAGING | Age: 71
Discharge: HOME | End: 2021-04-17
Payer: MEDICARE

## 2021-04-15 ENCOUNTER — TELEPHONE (OUTPATIENT)
Dept: OBGYN CLINIC | Age: 71
End: 2021-04-15

## 2021-04-15 DIAGNOSIS — N64.9 BREAST LESION: ICD-10-CM

## 2021-04-15 DIAGNOSIS — Z12.31 ENCOUNTER FOR SCREENING MAMMOGRAM FOR MALIGNANT NEOPLASM OF BREAST: ICD-10-CM

## 2021-04-15 DIAGNOSIS — Z78.0 POSTMENOPAUSAL: ICD-10-CM

## 2021-04-15 PROCEDURE — 77080 DXA BONE DENSITY AXIAL: CPT

## 2021-04-15 PROCEDURE — 76642 ULTRASOUND BREAST LIMITED: CPT

## 2021-04-15 PROCEDURE — G0279 TOMOSYNTHESIS, MAMMO: HCPCS

## 2021-04-15 NOTE — TELEPHONE ENCOUNTER
----- Message from TOI Walters NP sent at 4/15/2021  2:58 PM EDT -----  Mammogram ordered  No visible skin lesions  Benign small oil cyst and mild duct ectasia with debris  Follow up in 12 months for annual mammogram

## 2022-01-24 NOTE — ANESTHESIA POST-OP
Doing well postop without anesthesia complications. Pain was under control. Anesthesia signed off.
Yes-Patient/Caregiver accepts free interpretation services...

## 2022-03-09 ENCOUNTER — OFFICE VISIT (OUTPATIENT)
Dept: OBGYN CLINIC | Age: 72
End: 2022-03-09
Payer: MEDICARE

## 2022-03-09 VITALS
DIASTOLIC BLOOD PRESSURE: 84 MMHG | BODY MASS INDEX: 38.32 KG/M2 | HEIGHT: 65 IN | SYSTOLIC BLOOD PRESSURE: 138 MMHG | WEIGHT: 230 LBS

## 2022-03-09 DIAGNOSIS — Z12.11 COLON CANCER SCREENING: ICD-10-CM

## 2022-03-09 DIAGNOSIS — Z12.31 ENCOUNTER FOR SCREENING MAMMOGRAM FOR MALIGNANT NEOPLASM OF BREAST: ICD-10-CM

## 2022-03-09 DIAGNOSIS — Z01.419 WELL WOMAN EXAM: Primary | ICD-10-CM

## 2022-03-09 DIAGNOSIS — Z12.11 COLON CANCER SCREENING: Primary | ICD-10-CM

## 2022-03-09 DIAGNOSIS — R19.5 POSITIVE COLORECTAL CANCER SCREENING USING COLOGUARD TEST: ICD-10-CM

## 2022-03-09 PROCEDURE — 99397 PER PM REEVAL EST PAT 65+ YR: CPT | Performed by: NURSE PRACTITIONER

## 2022-03-09 ASSESSMENT — PATIENT HEALTH QUESTIONNAIRE - PHQ9
SUM OF ALL RESPONSES TO PHQ QUESTIONS 1-9: 0
1. LITTLE INTEREST OR PLEASURE IN DOING THINGS: 0
SUM OF ALL RESPONSES TO PHQ9 QUESTIONS 1 & 2: 0
SUM OF ALL RESPONSES TO PHQ QUESTIONS 1-9: 0
2. FEELING DOWN, DEPRESSED OR HOPELESS: 0

## 2022-03-09 NOTE — PROGRESS NOTES
History and Physical  [address], [address], [facility] [place] [address]. [phone]   Fax [phone]  Robert Lantigua  3/9/2022              70 y.o. Chief Complaint   Patient presents with    Annual Exam       No LMP recorded. Patient has had a hysterectomy. Primary Care Physician: Priscilla Breen MD    The patient was seen and examined. She has no chief complaint today and is here for her annual exam.  Her bowels are regular. There are no voiding complaints. She denies any bloating. She denies vaginal discharge and was counseled on STD's and the need for barrier contraception. HPI : Robert Lantigua is a 70 y.o. female     Annual exam  Hysterectomy for benign disease (heavy menses)  No chief complaint  + cologuard- aware of need for colonoscopy. no Bloating  no Early Satiety  no Unexplained weight change of more than 15 lbs  no  PMB  no  PCB  ________________________________________________________________________  OB History    Para Term  AB Living   1 1 1 0 0 1   SAB IAB Ectopic Molar Multiple Live Births   0 0 0 0 0 1      # Outcome Date GA Lbr Chang/2nd Weight Sex Delivery Anes PTL Lv   1 Term 5    F Vag-Spont   PANDA     Past Medical History:   Diagnosis Date    Anesthesia     woke up crying x1    Anesthesia complication     PATIENT WAKES UP CRYING AFTER ANESTHESIA    Cataract     BILATERAL EYES    CKD (chronic kidney disease) stage 3, GFR 30-59 ml/min ([facility])     Constipation     Fibromyalgia     Frequent stools     GERD (gastroesophageal reflux disease)     Hiatal hernia     High calcium levels     hx.  of , had\" lower left thyroid removed\"    History of blood transfusion     AUTOLOGUS X1 AFTER HYSTERECTOMY    Hypercholesterolemia     OA (osteoarthritis) 10/28/2013    Osteoarthritis     Parathyroid adenoma     PONV (postoperative nausea and vomiting)     Slow urinary stream     Snores     Wears glasses                                                                    Past Surgical History:   Procedure Laterality Date    APPENDECTOMY  1964    CARPAL TUNNEL RELEASE Bilateral     CHOLECYSTECTOMY      COLONOSCOPY      1.4., 8..    CYST REMOVAL Left     GANGLION REMOVED 2 TIMES    ENDOSCOPY, COLON, DIAGNOSTIC      EGD    HYSTERECTOMY      UTERUS ONLY (GURU)    PARATHYROIDECTOMY  2018    IA EXPLORE PARATHYROID GLANDS Left 2018    PARATHYROIDECTOMY LOWER, NIM MONITOR performed by Cortland Severs, MD at [address] [place] [address],[address] ENDOSCOPY      2005, 2010    UPPER GASTROINTESTINAL ENDOSCOPY      VAGINA SURGERY N/A 2020    VAGINAL CYSTOCELE REPAIR performed by Tracie Kee DO at [facility] History   Problem Relation Age of Onset    Diabetes Mother     Heart Disease Mother     High Blood Pressure Mother     Heart Disease Father     Diabetes Father         RESOLVED WITH WEIGHT LOSS    High Blood Pressure Father     Kidney Disease Father     Cancer Maternal Grandmother         colon, uterine, breast    Diabetes Maternal Grandmother      Social History     Socioeconomic History    Marital status:      Spouse name: Not on file    Number of children: Not on file    Years of education: Not on file    Highest education level: Not on file   Occupational History    Not on file   Tobacco Use    Smoking status: Former Smoker     Packs/day: 2.00     Years: 20.00     Pack years: 40.00     Types: Cigarettes     Quit date: 1990     Years since quittin.6    Smokeless tobacco: Never Used   Vaping Use    Vaping Use: Never used   Substance and Sexual Activity    Alcohol use: No    Drug use: No    Sexual activity: Not on file   Other Topics Concern    Not on file   Social History Narrative    Not on file     Social Determinants of Health     Financial Resource Strain: Low Risk     Difficulty of Paying Living Expenses: Not hard at all   Food Insecurity: No Food Insecurity    Worried About Running Out of Food in the Last Year: Never true    Carlie of Food in the Last Year: Never true   Transportation Needs:     Lack of Transportation (Medical): Not on file    Lack of Transportation (Non-Medical): Not on file   Physical Activity:     Days of Exercise per Week: Not on file    Minutes of Exercise per Session: Not on file   Stress:     Feeling of Stress : Not on file   Social Connections:     Frequency of Communication with Friends and Family: Not on file    Frequency of Social Gatherings with Friends and Family: Not on file    Attends Gnosticist Services: Not on file    Active Member of [address] [place] [address] or Organizations: Not on file    Attends Club or Organization Meetings: Not on file    Marital Status: Not on file   Intimate Partner Violence:     Fear of Current or Ex-Partner: Not on file    Emotionally Abused: Not on file    Physically Abused: Not on file    Sexually Abused: Not on file   Housing Stability:     Unable to Pay for Housing in the Last Year: Not on file    Number of [place] in the Last Year: Not on file    Unstable Housing in the Last Year: Not on file       MEDICATIONS:  Current Outpatient Medications   Medication Sig Dispense Refill    omeprazole (PRILOSEC) 20 MG delayed release capsule Take 1 capsule by mouth twice daily 180 capsule 1    [START ON 3/11/2022] atorvastatin (LIPITOR) 10 MG tablet Take 1 tablet by mouth once daily 90 tablet 1    acetaminophen (TYLENOL) 500 MG tablet Take 1,000 mg by mouth 2 times daily as needed       DiphenhydrAMINE HCl (BENADRYL ALLERGY PO) Take 25 mg by mouth every 6 hours as needed Takes 1 -2  At a time      Handicap Placard MISC It is my medical opinin that Ruta Kehr requires a disability parking placard for the following reasons:  She has limited walking ability due to an arthritic condition.   Duration of need: permanent 1 each 0     No current facility-administered medications for this visit. ALLERGIES:  Allergies as of 03/09/2022 - Fully Reviewed 03/09/2022   Allergen Reaction Noted    Seasonal Other (See Comments) 08/28/2018       Symptoms of decreased mood absent  Symptoms of anhedonia absent    **If either question is answered in a  positive fashion then complete the PHQ9 Scoring Evaluation and make the appropriate referral**      Immunization status: stated as current, but no records available. Gynecologic History:  Menarche: 7 yo  Menopause at hyst yo     No LMP recorded. Patient has had a hysterectomy. Sexually Active: No    STD History: No     Permanent Sterilization: Yes, hyst   Reversible Birth Control: No        Hormone Replacement Exposure: Yes premarin x 1 year      Genetic Qualified Family History of Breast, Ovarian , Colon or Uterine Cancer: no (maternal grandmother with uterine cancer then breast, then colon cancer- late 62s)     If YES see scanned worksheet. Preventative Health Testing:    Health Maintenance:  Health Maintenance Due   Topic Date Due    Colorectal Cancer Screen  01/05/2021    Potassium monitoring  11/09/2021    Creatinine monitoring  11/09/2021       Date of Last Pap Smear: years ago- normal  Abnormal Pap Smear History: denies  Colposcopy History:   Date of Last Mammogram: 4/15/2021 negative  Date of Last Colonoscopy: 1/20/2022 + cologuard- agrees to referral for colonoscopy  Date of Last Bone Density:4/15/2021 normal      ________________________________________________________________________        REVIEW OF SYSTEMS:    yes   A minimum of an eleven point review of systems was completed. Review Of Systems (11 point):  Constitutional: No fever, chills or malaise;  No weight change or fatigue  Head and Eyes: No vision changes, Headache, Dizziness or trauma in last 12 months  ENT ROS: No hearing, Tinnitis, sinus or taste problems  Hematological and Lymphatic ROS:No Lymphoma, Von Willebrand's, Hemophillia or Bleeding History  Psych ROS: No Depression, Homicidal thoughts,suicidal thoughts, or anxiety  Breast ROS: No breast abnormalities or lumps  Respiratory ROS: No SOB, Pneumoniae,Cough, or Pulmonary Embolism   Cardiovascular ROS: No Chest Pain with Exertion, Palpitations, Syncope, Edema, Arrhythmia. + hyperlipidemia  Gastrointestinal ROS: No Indigestion, Heartburn, Nausea, vomiting, Diarrhea, Constipation,or Bowel Changes; No Bloody Stools or melena  Genito-Urinary ROS: No Dysuria, Hematuria or Nocturia. No Urinary Incontinence or Vaginal Discharge  Musculoskeletal ROS: No Arthralgia, Arthritis,Gout,Osteoporosis or Rheumatism  Neurological ROS: No CVA, Migraines, Epilepsy, Seizure Hx, or Limb Weakness  Dermatological ROS: No Rash, Itching, Hives, Mole Changes or Cancer                                                                                                                                                                                                                                  PHYSICAL Exam:     Constitutional:  Vitals:    03/09/22 1058   BP: 138/84   Site: Right Upper Arm   Position: Sitting   Cuff Size: Medium Adult   Weight: 230 lb (104.3 kg)   Height: 5' 5\" (1.651 m)       Chaperone for Intimate Exam   Chaperone was offered and accepted as part of the rooming process.  Chaperone: Sandra          General Appearance: This  is a well Developed, well Nourished, well groomed female. Her BMI was reviewed. Nutritional decision making was discussed. Skin:  There was a Normal Inspection of the skin without rashes or lesions. There were no rashes. (Papular, Maculopapular, Hives, Pustular, Macular)     There were no lesions (Ulcers, Erythema, Abn. Appearing Nevi)            Lymphatic:  No Lymph Nodes were Palpable in the neck , axilla or groin.  0 # Of Lymph Nodes;  Location ; Character [Normal]  [Shotty] [Tender] [Enlarged]     Neck and EENT:  The neck was supple. There were no masses   The thyroid was not enlarged and had no masses. Perrla, EOMI B/L, TMI B/L No Abnormalities. Throat inspected-No exudates or Masses, Nares Patent No Masses        Respiratory: The lungs were auscultated and found to be clear. There were no rales, rhonchi or wheezes. There was a good respiratory effort. Cardiovascular: The heart was in a regular rate and rhythm. . No S3 or S4. There was no murmur appreciated. Location, grade, and radiation are not applicable. Extremities: The patients extremities were without calf tenderness, edema, or varicosities. There was full range of motion in all four extremities. Pulses in all four extremities were appreciated and are 2/4. Abdomen: The abdomen was soft and non-tender. There were good bowel sounds in all quadrants and there was no guarding, rebound or rigidity. On evaluation there was no evidence of hepatosplenomegaly and there was no costal vertebral vanessa tenderness bilaterally. No hernias were appreciated. Abdominal Scars: hyst scar    Psych: The patient had a normal Orientation to: Time, Place, Person, and Situation  There is no Mood / Affect changes    Breast:  (Chest)  normal appearance, no masses or tenderness, No nipple retraction or dimpling, No nipple discharge or bleeding, No axillary or supraclavicular adenopathy, Normal to palpation without dominant masses  Self breast exams were reviewed in detail. Literature was given. Pelvic Exam:  External genitalia: hair loss and fat pad loss  Urinary system: urethral meatus normal  Vaginal: atrophic mucosa  Cervix: absent  Adnexa: non palpable  Uterus: absent    Rectal Exam:  exam declined by patient          Musculosk:  Normal Gait and station was noted. Digits were evaluated without abnormal findings. Range of motion, stability and strength were evaluated and found to be appropriate for the patients age. ASSESSMENT:      70 y.o.  Annual   Diagnosis Orders   1. Well woman exam  FIORELLA DIGITAL SCREEN W OR WO CAD BILATERAL   2. Encounter for screening mammogram for malignant neoplasm of breast  FIORELLA DIGITAL SCREEN W OR WO CAD BILATERAL   3. Colon cancer screening  Ambulatory referral to Gastroenterology   4. Positive colorectal cancer screening using Cologuard test  Ambulatory referral to Gastroenterology          Chief Complaint   Patient presents with    Annual Exam          Past Medical History:   Diagnosis Date    Anesthesia     woke up crying x1    Anesthesia complication     PATIENT WAKES UP CRYING AFTER ANESTHESIA    Cataract     BILATERAL EYES    CKD (chronic kidney disease) stage 3, GFR 30-59 ml/min (HCC)     Constipation     Fibromyalgia     Frequent stools     GERD (gastroesophageal reflux disease)     Hiatal hernia     High calcium levels     hx. 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 Tobacco & Secondary smoke risks reviewed; instructed on cessation and avoidance      Counseling Completed:  Preventative Health Recommendations and Follow up.   The patient provider. The time component had both face to face and non face to face time spent in determining the total time component. Counseling and education regarding her diagnosis listed below and her options regarding those diagnoses were also included in determining her time component. Diagnosis Orders   1. Well woman exam  FIORELLA DIGITAL SCREEN W OR WO CAD BILATERAL   2. Encounter for screening mammogram for malignant neoplasm of breast  FIORELLA DIGITAL SCREEN W OR WO CAD BILATERAL   3. Colon cancer screening  Ambulatory referral to Gastroenterology   4. Positive colorectal cancer screening using Cologuard test  Ambulatory referral to Gastroenterology        The patient had her preventative health maintenance recommendations and follow-up reviewed with her at the completion of her visit.

## 2022-03-16 ENCOUNTER — HOSPITAL ENCOUNTER (OUTPATIENT)
Dept: PREADMISSION TESTING | Age: 72
Discharge: HOME | End: 2022-03-20

## 2022-03-16 VITALS — HEIGHT: 65 IN | WEIGHT: 226 LBS | BODY MASS INDEX: 37.65 KG/M2

## 2022-03-16 RX ORDER — M-VIT,TX,IRON,MINS/CALC/FOLIC 27MG-0.4MG
1 TABLET ORAL DAILY
COMMUNITY

## 2022-03-16 NOTE — PROGRESS NOTES

## 2022-03-17 ENCOUNTER — ANESTHESIA EVENT (OUTPATIENT)
Dept: ENDOSCOPY | Age: 72
End: 2022-03-17
Payer: MEDICARE

## 2022-03-18 ENCOUNTER — ANESTHESIA (OUTPATIENT)
Dept: ENDOSCOPY | Age: 72
End: 2022-03-18
Payer: MEDICARE

## 2022-03-18 ENCOUNTER — HOSPITAL ENCOUNTER (OUTPATIENT)
Age: 72
Setting detail: OUTPATIENT SURGERY
Discharge: HOME | End: 2022-03-18
Attending: SURGERY | Admitting: SURGERY
Payer: MEDICARE

## 2022-03-18 VITALS
TEMPERATURE: 96.5 F | DIASTOLIC BLOOD PRESSURE: 65 MMHG | HEIGHT: 65 IN | RESPIRATION RATE: 16 BRPM | BODY MASS INDEX: 38.15 KG/M2 | OXYGEN SATURATION: 97 % | HEART RATE: 54 BPM | WEIGHT: 229 LBS | SYSTOLIC BLOOD PRESSURE: 156 MMHG

## 2022-03-18 VITALS
RESPIRATION RATE: 17 BRPM | SYSTOLIC BLOOD PRESSURE: 105 MMHG | TEMPERATURE: 96.8 F | OXYGEN SATURATION: 99 % | DIASTOLIC BLOOD PRESSURE: 55 MMHG

## 2022-03-18 PROCEDURE — 7100000031 HC ASPR PHASE II RECOVERY - ADDTL 15 MIN: Performed by: SURGERY

## 2022-03-18 PROCEDURE — 7100000011 HC PHASE II RECOVERY - ADDTL 15 MIN: Performed by: SURGERY

## 2022-03-18 PROCEDURE — 2580000003 HC RX 258: Performed by: ANESTHESIOLOGY

## 2022-03-18 PROCEDURE — 3700000001 HC ADD 15 MINUTES (ANESTHESIA): Performed by: SURGERY

## 2022-03-18 PROCEDURE — 7100000000 HC PACU RECOVERY - FIRST 15 MIN: Performed by: SURGERY

## 2022-03-18 PROCEDURE — 2709999900 HC NON-CHARGEABLE SUPPLY: Performed by: SURGERY

## 2022-03-18 PROCEDURE — 6360000002 HC RX W HCPCS: Performed by: NURSE ANESTHETIST, CERTIFIED REGISTERED

## 2022-03-18 PROCEDURE — 3609010600 HC COLONOSCOPY POLYPECTOMY SNARE/COLD BIOPSY: Performed by: SURGERY

## 2022-03-18 PROCEDURE — 3700000000 HC ANESTHESIA ATTENDED CARE: Performed by: SURGERY

## 2022-03-18 PROCEDURE — 7100000001 HC PACU RECOVERY - ADDTL 15 MIN: Performed by: SURGERY

## 2022-03-18 PROCEDURE — 88305 TISSUE EXAM BY PATHOLOGIST: CPT

## 2022-03-18 PROCEDURE — 2500000003 HC RX 250 WO HCPCS: Performed by: NURSE ANESTHETIST, CERTIFIED REGISTERED

## 2022-03-18 PROCEDURE — 7100000030 HC ASPR PHASE II RECOVERY - FIRST 15 MIN: Performed by: SURGERY

## 2022-03-18 PROCEDURE — 7100000010 HC PHASE II RECOVERY - FIRST 15 MIN: Performed by: SURGERY

## 2022-03-18 RX ORDER — LIDOCAINE HYDROCHLORIDE 20 MG/ML
INJECTION, SOLUTION EPIDURAL; INFILTRATION; INTRACAUDAL; PERINEURAL PRN
Status: DISCONTINUED | OUTPATIENT
Start: 2022-03-18 | End: 2022-03-18 | Stop reason: SDUPTHER

## 2022-03-18 RX ORDER — LIDOCAINE HYDROCHLORIDE 10 MG/ML
1 INJECTION, SOLUTION EPIDURAL; INFILTRATION; INTRACAUDAL; PERINEURAL
Status: DISCONTINUED | OUTPATIENT
Start: 2022-03-18 | End: 2022-03-18 | Stop reason: HOSPADM

## 2022-03-18 RX ORDER — ONDANSETRON 2 MG/ML
4 INJECTION INTRAMUSCULAR; INTRAVENOUS
Status: DISCONTINUED | OUTPATIENT
Start: 2022-03-18 | End: 2022-03-18 | Stop reason: HOSPADM

## 2022-03-18 RX ORDER — MEPERIDINE HYDROCHLORIDE 25 MG/ML
12.5 INJECTION INTRAMUSCULAR; INTRAVENOUS; SUBCUTANEOUS EVERY 5 MIN PRN
Status: DISCONTINUED | OUTPATIENT
Start: 2022-03-18 | End: 2022-03-18 | Stop reason: HOSPADM

## 2022-03-18 RX ORDER — SODIUM CHLORIDE 0.9 % (FLUSH) 0.9 %
5-40 SYRINGE (ML) INJECTION PRN
Status: DISCONTINUED | OUTPATIENT
Start: 2022-03-18 | End: 2022-03-18 | Stop reason: HOSPADM

## 2022-03-18 RX ORDER — SODIUM CHLORIDE 9 MG/ML
25 INJECTION, SOLUTION INTRAVENOUS PRN
Status: DISCONTINUED | OUTPATIENT
Start: 2022-03-18 | End: 2022-03-18 | Stop reason: HOSPADM

## 2022-03-18 RX ORDER — PROPOFOL 10 MG/ML
INJECTION, EMULSION INTRAVENOUS PRN
Status: DISCONTINUED | OUTPATIENT
Start: 2022-03-18 | End: 2022-03-18 | Stop reason: SDUPTHER

## 2022-03-18 RX ORDER — FENTANYL CITRATE 50 UG/ML
25 INJECTION, SOLUTION INTRAMUSCULAR; INTRAVENOUS EVERY 5 MIN PRN
Status: DISCONTINUED | OUTPATIENT
Start: 2022-03-18 | End: 2022-03-18 | Stop reason: HOSPADM

## 2022-03-18 RX ORDER — SODIUM CHLORIDE 0.9 % (FLUSH) 0.9 %
10 SYRINGE (ML) INJECTION PRN
Status: DISCONTINUED | OUTPATIENT
Start: 2022-03-18 | End: 2022-03-18 | Stop reason: HOSPADM

## 2022-03-18 RX ORDER — METOCLOPRAMIDE HYDROCHLORIDE 5 MG/ML
10 INJECTION INTRAMUSCULAR; INTRAVENOUS
Status: DISCONTINUED | OUTPATIENT
Start: 2022-03-18 | End: 2022-03-18 | Stop reason: HOSPADM

## 2022-03-18 RX ORDER — SODIUM CHLORIDE 0.9 % (FLUSH) 0.9 %
5-40 SYRINGE (ML) INJECTION EVERY 12 HOURS SCHEDULED
Status: DISCONTINUED | OUTPATIENT
Start: 2022-03-18 | End: 2022-03-18 | Stop reason: HOSPADM

## 2022-03-18 RX ORDER — SODIUM CHLORIDE, SODIUM LACTATE, POTASSIUM CHLORIDE, CALCIUM CHLORIDE 600; 310; 30; 20 MG/100ML; MG/100ML; MG/100ML; MG/100ML
INJECTION, SOLUTION INTRAVENOUS CONTINUOUS
Status: DISCONTINUED | OUTPATIENT
Start: 2022-03-18 | End: 2022-03-18 | Stop reason: HOSPADM

## 2022-03-18 RX ORDER — DIPHENHYDRAMINE HYDROCHLORIDE 50 MG/ML
12.5 INJECTION INTRAMUSCULAR; INTRAVENOUS
Status: DISCONTINUED | OUTPATIENT
Start: 2022-03-18 | End: 2022-03-18 | Stop reason: HOSPADM

## 2022-03-18 RX ADMIN — SODIUM CHLORIDE, POTASSIUM CHLORIDE, SODIUM LACTATE AND CALCIUM CHLORIDE: 600; 310; 30; 20 INJECTION, SOLUTION INTRAVENOUS at 06:58

## 2022-03-18 RX ADMIN — PROPOFOL 50 MG: 10 INJECTION, EMULSION INTRAVENOUS at 08:36

## 2022-03-18 RX ADMIN — GLUCAGON HYDROCHLORIDE 1 MG: KIT at 08:43

## 2022-03-18 RX ADMIN — LIDOCAINE HYDROCHLORIDE 60 MG: 20 INJECTION, SOLUTION EPIDURAL; INFILTRATION; INTRACAUDAL; PERINEURAL at 08:34

## 2022-03-18 RX ADMIN — PROPOFOL 150 MG: 10 INJECTION, EMULSION INTRAVENOUS at 08:34

## 2022-03-18 ASSESSMENT — PULMONARY FUNCTION TESTS
PIF_VALUE: 19
PIF_VALUE: 5
PIF_VALUE: 19
PIF_VALUE: 24
PIF_VALUE: 4
PIF_VALUE: 1
PIF_VALUE: 18
PIF_VALUE: 19
PIF_VALUE: 2
PIF_VALUE: 11
PIF_VALUE: 5
PIF_VALUE: 19
PIF_VALUE: 13
PIF_VALUE: 24
PIF_VALUE: 19
PIF_VALUE: 2
PIF_VALUE: 24
PIF_VALUE: 0
PIF_VALUE: 19
PIF_VALUE: 2
PIF_VALUE: 19
PIF_VALUE: 19

## 2022-03-18 ASSESSMENT — ENCOUNTER SYMPTOMS
SHORTNESS OF BREATH: 0
BACK PAIN: 1
WHEEZING: 0
CHEST TIGHTNESS: 0
SINUS PAIN: 0
TROUBLE SWALLOWING: 0
COUGH: 0
SORE THROAT: 0
RHINORRHEA: 0
APNEA: 0
SINUS PRESSURE: 0

## 2022-03-18 ASSESSMENT — PAIN - FUNCTIONAL ASSESSMENT: PAIN_FUNCTIONAL_ASSESSMENT: 0-10

## 2022-03-18 ASSESSMENT — PAIN SCALES - GENERAL: PAINLEVEL_OUTOF10: 0

## 2022-03-18 NOTE — H&P
HISTORY and Treinta DAISY [facility] [address]       NAME:  Cheyenne Porter  MRN: [id number]   YOB: 1950   Date: 3/18/2022   Age: 70 y.o. Gender: female       COMPLAINT AND PRESENT HISTORY:   Cheyenne Porter  is a 70 y.o. female presenting today for a COLONOSCOPY DIAGNOSTIC as r/t recent positive cologuard test. Pt denies any gi symptoms including n/v, no abdominal pain, no bloody. She states on a monthly basis she will have dark stools. She does report constipation that will then turn to diarrhea. GERD-controlled ok with meds, states she does belch quite a bit and has a hiatal hernia. Pt reports completing bowel prep without complications, last BM reported this morning. She states last BM was clear with no stool particles. Pt has a PMHX significant for CKD III, hx of parathyroid tumor -follows with dr. Dm Pan. STOP-BANG Sleep Apnea Questionnaire     SNORE loudly (heard through closed doors)? yes  TIRED, fatigued, sleepy during daytime?                                           no  OBSERVED stopping breathing during sleep?                                  no  High blood PRESSURE or being treated?                                         no     BMI over 35?                                                                                       yes  AGE over 50?                                                                                    yes  NECK circumference over 16\"? no  GENDER (male)? no                                                                                                                 Total 3  High risk 5-8  Intermediate risk 3-4  Low risk 0-2      NPO since midnight. Pt does not wear dentures.    Pt denies any hx of MRSA infection  Pt not currently taking any blood thinners or anticoagulants  Pt does have PONV, and emotional   Pt denies any acute symptoms of illness at this time including no SOB, CP, fever, URI or UTI symptoms. RECENT IMAGING    No results found. PAST MEDICAL HISTORY     Past Medical History:   Diagnosis Date    Anesthesia     woke up crying x1    Anesthesia complication     PATIENT WAKES UP CRYING AFTER ANESTHESIA    Cataract     BILATERAL EYES    CKD (chronic kidney disease) stage 3, GFR 30-59 ml/min (HCC)     Constipation     Fibromyalgia     Frequent stools     GERD (gastroesophageal reflux disease)     Hiatal hernia     High calcium levels     hx.  of , had\" lower left thyroid removed\"    History of blood transfusion 1990    AUTOLOGUS X1 AFTER HYSTERECTOMY    Hypercholesterolemia     OA (osteoarthritis) 10/28/2013    Osteoarthritis     Parathyroid adenoma     PONV (postoperative nausea and vomiting)     Slow urinary stream     Snores     Wears glasses        SURGICAL HISTORY       Past Surgical History:   Procedure Laterality Date    APPENDECTOMY  1964    CARPAL TUNNEL RELEASE Bilateral     CHOLECYSTECTOMY      COLONOSCOPY      1.4.2011, 8.23.2011    CYST REMOVAL Left     GANGLION REMOVED 2 TIMES    ENDOSCOPY, COLON, DIAGNOSTIC  7/13/200512/20/2010    EGD    HYSTERECTOMY  1990    UTERUS ONLY (GURU)    PARATHYROIDECTOMY  09/04/2018    [place] EXPLORE PARATHYROID GLANDS Left 9/4/2018    PARATHYROIDECTOMY LOWER, NIM MONITOR performed by Tracy Reyes MD at [address] [place] [address],[address] ENDOSCOPY      7.13.2005, 12.20.2010    UPPER GASTROINTESTINAL ENDOSCOPY      VAGINA SURGERY N/A 8/24/2020    VAGINAL CYSTOCELE REPAIR performed by Jacky Lane DO at [facility] 115 HISTORY       Family History   Problem Relation Age of Onset    Diabetes Mother     Heart Disease Mother     High Blood Pressure Mother     Heart Disease Father     Diabetes Father         RESOLVED WITH WEIGHT LOSS    High Blood Pressure Father     Kidney Disease Father     Cancer Maternal Grandmother         colon, uterine, breast    Diabetes Maternal Grandmother        SOCIAL HISTORY       Social History     Socioeconomic History    Marital status:      Spouse name: Not on file    Number of children: Not on file    Years of education: Not on file    Highest education level: Not on file   Occupational History    Not on file   Tobacco Use    Smoking status: Former Smoker     Packs/day: 2.00     Years: 20.00     Pack years: 40.00     Types: Cigarettes     Quit date: 1990     Years since quittin.6    Smokeless tobacco: Never Used   Vaping Use    Vaping Use: Never used   Substance and Sexual Activity    Alcohol use: No    Drug use: No    Sexual activity: Not on file   Other Topics Concern    Not on file   Social History Narrative    Not on file     Social Determinants of Health     Financial Resource Strain: Low Risk     Difficulty of Paying Living Expenses: Not hard at all   Food Insecurity: No Food Insecurity    Worried About [employer] [employer] in the Last Year: Never true    [facility] in the Last Year: Never true   Transportation Needs:     Lack of Transportation (Medical): Not on file    Lack of Transportation (Non-Medical):  Not on file   Physical Activity:     Days of Exercise per Week: Not on file    Minutes of Exercise per Session: Not on file   Stress:     Feeling of Stress : Not on file   Social Connections:     Frequency of Communication with Friends and Family: Not on file    Frequency of Social Gatherings with Friends and Family: Not on file    Attends Yazidism Services: Not on file    Active Member of Clubs or Organizations: Not on file    Attends Club or Organization Meetings: Not on file    Marital Status: Not on file   Intimate Partner Violence:     Fear of Current or Ex-Partner: Not on file    Emotionally Abused: Not on file    Physically Abused: Not on file  Sexually Abused: Not on file   Housing Stability:     Unable to Pay for Housing in the Last Year: Not on file    Number of Places Lived in the Last Year: Not on file    Unstable Housing in the Last Year: Not on file           REVIEW OF SYSTEMS      Allergies   Allergen Reactions    Seasonal Other (See Comments)     STUFFY NOSE, FLUID IN EARS       No current facility-administered medications on file prior to encounter. Current Outpatient Medications on File Prior to Encounter   Medication Sig Dispense Refill    atorvastatin (LIPITOR) 10 MG tablet Take 1 tablet by mouth once daily 90 tablet 0    omeprazole (PRILOSEC) 20 MG delayed release capsule Take 1 capsule by mouth twice daily 180 capsule 1    acetaminophen (TYLENOL) 500 MG tablet Take 1,000 mg by mouth 2 times daily as needed       DiphenhydrAMINE HCl (BENADRYL ALLERGY PO) Take 25 mg by mouth every 6 hours as needed Takes 1 -2  At a time      Handicap Placard MISC It is my medical opinin that Cesar Dodd requires a disability parking placard for the following reasons:  She has limited walking ability due to an arthritic condition. Duration of need: permanent 1 each 0        Review of Systems   Constitutional: Negative for chills, diaphoresis, fatigue and fever. HENT: Negative for congestion, dental problem, ear pain, postnasal drip, rhinorrhea, sinus pressure, sinus pain, sore throat and trouble swallowing. Eyes: Positive for visual disturbance. Glasses    Respiratory: Negative for apnea, cough, chest tightness, shortness of breath and wheezing. Cardiovascular: Negative for chest pain, palpitations and leg swelling. Gastrointestinal:        See hpi    Genitourinary: Negative for dysuria, flank pain, frequency and hematuria. Musculoskeletal: Positive for back pain. Negative for joint swelling and myalgias. Upper back, right sided. Skin: Negative for rash and wound. Neurological: Positive for dizziness.  Negative for weakness, numbness and headaches. With sudden position changes. Hematological: Does not bruise/bleed easily. Psychiatric/Behavioral: Negative for agitation and confusion. The patient is not nervous/anxious. See HPI    GENERAL PHYSICAL EXAM:     Vitals:See nurse flowsheet for current vitals. Physical Exam  Constitutional:       General: She is not in acute distress. Appearance: Normal appearance. She is well-developed. She is obese. She is not ill-appearing or toxic-appearing. HENT:      Head: Normocephalic and atraumatic. Mouth/Throat:      Mouth: Mucous membranes are dry. Pharynx: Oropharynx is clear. No oropharyngeal exudate or posterior oropharyngeal erythema. Eyes:      Extraocular Movements: Extraocular movements intact. Conjunctiva/sclera: Conjunctivae normal.      Pupils: Pupils are equal, round, and reactive to light. Comments: +glasses    Cardiovascular:      Rate and Rhythm: Normal rate and regular rhythm. Pulses: Normal pulses. Heart sounds: Normal heart sounds. No murmur heard. No friction rub. No gallop. Pulmonary:      Effort: Pulmonary effort is normal.      Breath sounds: Normal breath sounds. No wheezing. Abdominal:      General: Bowel sounds are normal. There is no distension. Palpations: Abdomen is soft. Tenderness: There is no abdominal tenderness. There is no guarding or rebound. Comments: Hyperactive bs. Musculoskeletal:         General: No swelling. Normal range of motion. Cervical back: Normal range of motion and neck supple. No rigidity or tenderness. Right lower leg: No edema. Left lower leg: No edema. Skin:     General: Skin is warm and dry. Findings: No erythema. Neurological:      General: No focal deficit present. Mental Status: She is alert and oriented to person, place, and time. Mental status is at baseline. Sensory: No sensory deficit.    Psychiatric: Mood and Affect: Mood normal.         Behavior: Behavior normal.         Thought Content:  Thought content normal.         Judgment: Judgment normal.                                                                                         PROVISIONAL DIAGNOSES / SURGERY:      Positive cologard  COLONOSCOPY DIAGNOSTIC        Patient Active Problem List    Diagnosis Date Noted    [place]-Walker grade 2-3 cystocele 01/07/2020    Overflow incontinence 01/07/2020    Bradycardia 09/10/2020    Vaginal Cystocele Repair 8/24/20 08/24/2020    Pancreatitis 09/12/2018    Osteoarthritis     Hypercholesterolemia     Hiatal hernia     CKD (chronic kidney disease) stage 3, GFR 30-59 ml/min ([facility])     Pure hypercholesterolemia 04/13/2018    Fibromyalgia 10/28/2013    GERD (gastroesophageal reflux disease) 07/06/2012               TOI Vivas - CNP on 3/18/2022 at 6:20 AM

## 2022-03-18 NOTE — ANESTHESIA POSTPROCEDURE EVALUATION
POST- ANESTHESIA EVALUATION       Pt Name: Booker Sweet  MRN: [id number]  YOB: 1950  Date of evaluation: 3/18/2022  Time:  10:37 AM      BP (!) 156/65   Pulse 54   Temp 96.5 °F (35.8 °C) (Temporal)   Resp 16   Ht 5' 5\" (1.651 m)   Wt 229 lb (103.9 kg)   SpO2 97%   BMI 38.11 kg/m²      Consciousness Level  Awake  Cardiopulmonary Status  Stable  Pain Adequately Treated YES  Nausea / Vomiting  NO  Adequate Hydration  YES  Anesthesia Related Complications NONE      Electronically signed by Yenni Graham MD on 3/18/2022 at 10:37 AM       Department of Anesthesiology  Postprocedure Note    Patient: Booker Sweet  MRN: [id number]  YOB: 1950  Date of evaluation: 3/18/2022  Time:  10:37 AM     Procedure Summary     Date: 03/18/22 Room / Location: [place] [address] [address]    Anesthesia Start: 0830 Anesthesia Stop: 5770    Procedure: COLONOSCOPY POLYPECTOMY HOT BIOPSY (N/A ) Diagnosis: (Moncho Rojas / Elayne Munroe)    Surgeons: Maikol Richey MD Responsible Provider: Yenni Graham MD    Anesthesia Type: general ASA Status: 3          Anesthesia Type: general    Rich Phase I: Rich Score: 10    Rich Phase II: Rich Score: 10    Last vitals: Reviewed and per EMR flowsheets.        Anesthesia Post Evaluation

## 2022-03-18 NOTE — OP NOTE
Operative Note      Patient: Olivier Gaitan  YOB: 1950  MRN: [id number]    Date of Procedure: 3/18/2022    PROCEDURE NOTE    DATE OF PROCEDURE: 3/18/2022    SURGEON: Ty Sow MD    ASSISTANT: None    PREOPERATIVE DIAGNOSIS: Positive Cologuard test    POSTOPERATIVE DIAGNOSIS: Grade 1 internal hemorrhoid. Sigmoid diverticulosis. Transverse colon polyp. Polypoid mass ileocecal valve versus large prolapsed ileocecal valve/lipomatous ileocecal valve    OPERATION: Total colonoscopy to cecum with intubation of terminal ileum. Hot snare polypectomy transverse colon polyp. Multiple biopsies polypoid mass ileocecal valve versus prolapsed ileocecal valve. ANESTHESIA: General    ESTIMATED BLOOD LOSS: None    COMPLICATIONS: None     SPECIMENS:  Was Obtained: Transverse colon polyp. Polypoid mass ileocecal valve versus large prolapsed ileocecal valve multiple biopsies obtained    HISTORY: The patient is a 70y.o. year old female with history of above preop diagnosis. I recommended colonoscopy with possible biopsy or polypectomy and I explained the risk, benefits, expected outcome, and alternatives to the procedure. Risks included but are not limited to bleeding, infection, respiratory distress, hypotension, and perforation of the colon and possibility of missing a lesion. The patient understands and is in agreement. PROCEDURE: The patient was given IV conscious sedation. The patient's SPO2 remained above 90% throughout the procedure. Digital rectal exam was normal.  The colonoscope was inserted through the anus into the rectum and advanced under direct vision to the cecum without difficulty. Terminal ileum was examined for approximately 2 inches. The prep was good. Findings:  Terminal ileum: normal    Cecum/Ascending colon: abnormal: Large prolapsed lipomatous ileocecal valve versus polypoid mass ileocecal valve multiple biopsies obtained. Refer to the pictures.     Transverse colon: abnormal: Transverse colon polyp removed with hot snare polypectomy technique. Descending/Sigmoid colon: abnormal: Sigmoid diverticulosis. Rectum/Anus: examined in normal and retroflexed positions and was abnormal: Grade 1 internal hemorrhoid. Withdrawal Time was (minutes): 20      The colon was decompressed. While withdrawing the scope the above findings were verified and the scope was removed. The patient tolerated the procedure and conscious sedation without unusual events. In the recovery room patient was examined and remains hemodynamically stable. Discharge home when criteria met. Recommendations/Plan:   1. F/U Biopsies  2. F/U In Office as instructed  3. Discussed with the family  4. High fiber diet   5. Precautions to avoid constipation  6. Depending on the pathology results I will make further recommendations including repeat colonoscopy with reevaluation. Will discuss with patient during her office visit.     Electronically signed by Jeanine Lr MD  on 3/18/2022 at 9:04 AM

## 2022-03-18 NOTE — ANESTHESIA PRE PROCEDURE
Department of Anesthesiology  Preprocedure Note       Name:  Booker Sweet   Age:  70 y.o.  :  1950                                          MRN:  [id number]         Date:  3/18/2022      Surgeon: Cathy Burnett):  Maikol Richey MD    Procedure: Procedure(s):  COLORECTAL CANCER SCREENING, NOT HIGH RISK    Medications prior to admission:   Prior to Admission medications    Medication Sig Start Date End Date Taking? Authorizing Provider   Multiple Vitamins-Minerals (THERAPEUTIC MULTIVITAMIN-MINERALS) tablet Take 1 tablet by mouth daily Women's multi vitamin daily    Historical Provider, MD   atorvastatin (LIPITOR) 10 MG tablet Take 1 tablet by mouth once daily 3/11/22   Ashia Chong MD   omeprazole (PRILOSEC) 20 MG delayed release capsule Take 1 capsule by mouth twice daily 21   Ashia Chong MD   acetaminophen (TYLENOL) 500 MG tablet Take 1,000 mg by mouth 2 times daily as needed     Historical Provider, MD   DiphenhydrAMINE HCl (BENADRYL ALLERGY PO) Take 25 mg by mouth every 6 hours as needed Takes 1 -2  At a time    Historical Provider, MD   [address] [place] [address] It is my medical opinin that Matteo Shock requires a disability parking placard for the following reasons:  She has limited walking ability due to an arthritic condition. Duration of need: permanent 3/26/18   TOI Aaron - CNP       Current medications:    Current Facility-Administered Medications   Medication Dose Route Frequency Provider Last Rate Last Admin    lidocaine PF 1 % injection 1 mL  1 mL IntraDERmal Once PRN Booker Moyer MD        lactated ringers infusion   IntraVENous Continuous Wahiawatimothy Swan MD        sodium chloride flush 0.9 % injection 10 mL  10 mL IntraVENous PRN Rosemary Swan MD        0.9 % sodium chloride infusion  25 mL IntraVENous PRN Booker Moyer MD           Allergies:     Allergies   Allergen Reactions    Seasonal Other (See Comments)     STUFFY NOSE, FLUID IN EARS       Problem List:    Patient Active Problem List   Diagnosis Code    GERD (gastroesophageal reflux disease) K21.9    Fibromyalgia M79.7    Pure hypercholesterolemia E78.00    Osteoarthritis M19.90    Hypercholesterolemia E78.00    Hiatal hernia K44.9    CKD (chronic kidney disease) stage 3, GFR 30-59 ml/min (HCC) N18.30    Pancreatitis K85.90    [place]-Walker grade 2-3 cystocele N81.10    Overflow incontinence N39.490    Vaginal Cystocele Repair 8/24/20 N81.10    Bradycardia R00.1       Past Medical History:        Diagnosis Date    Anesthesia     woke up crying x1    Anesthesia complication     PATIENT WAKES UP CRYING AFTER ANESTHESIA    Cataract     BILATERAL EYES    CKD (chronic kidney disease) stage 3, GFR 30-59 ml/min (HCC)     Constipation     Fibromyalgia     Frequent stools     GERD (gastroesophageal reflux disease)     Hiatal hernia     High calcium levels     hx.  of , had\" lower left thyroid removed\"    History of blood transfusion 1990    AUTOLOGUS X1 AFTER HYSTERECTOMY    Hypercholesterolemia     OA (osteoarthritis) 10/28/2013    Osteoarthritis     Parathyroid adenoma     PONV (postoperative nausea and vomiting)     Slow urinary stream     Snores     Wears glasses        Past Surgical History:        Procedure Laterality Date    APPENDECTOMY  1964    CARPAL TUNNEL RELEASE Bilateral     CHOLECYSTECTOMY      COLONOSCOPY      1.4.2011, 8.23.2011    CYST REMOVAL Left     GANGLION REMOVED 2 TIMES    ENDOSCOPY, COLON, DIAGNOSTIC  7/13/200512/20/2010    EGD    HYSTERECTOMY  1990    UTERUS ONLY (GURU)    PARATHYROIDECTOMY  09/04/2018    CT EXPLORE PARATHYROID GLANDS Left 9/4/2018    PARATHYROIDECTOMY LOWER, NIM MONITOR performed by Delfino Sanders MD at [address] ENDOSCOPY      7.13.2005, 12.20.2010    UPPER GASTROINTESTINAL ENDOSCOPY      VAGINA SURGERY N/A 8/24/2020    VAGINAL CYSTOCELE REPAIR performed by Rufino Ortiz Julia Daniel, DO at [facility] OR       Social History:    Social History     Tobacco Use    Smoking status: Former Smoker     Packs/day: 2.00     Years: 20.00     Pack years: 40.00     Types: Cigarettes     Quit date: 1990     Years since quittin.6    Smokeless tobacco: Never Used   Substance Use Topics    Alcohol use: No                                Counseling given: Not Answered      Vital Signs (Current):   Vitals:    22 0637   BP: (!) 159/77   Pulse: 69   Resp: 16   Temp: 97.3 °F (36.3 °C)   TempSrc: Infrared   SpO2: 96%   Weight: 229 lb (103.9 kg)   Height: 5' 5\" (1.651 m)                                              BP Readings from Last 3 Encounters:   22 (!) 159/77   22 138/84   21 122/78       NPO Status: Time of last liquid consumption:                         Time of last solid consumption: 1800                        Date of last liquid consumption: 22                        Date of last solid food consumption: 22    BMI:   Wt Readings from Last 3 Encounters:   22 229 lb (103.9 kg)   22 226 lb (102.5 kg)   22 230 lb (104.3 kg)     Body mass index is 38.11 kg/m². CBC:   Lab Results   Component Value Date    WBC 6.0 2020    RBC 4.57 2020    HGB 13.5 2020    HCT 41.2 2020    MCV 90 2020    RDW 14.1 2020     2020       CMP:   Lab Results   Component Value Date     2020    K 4.7 2020     2020    CO2 28 2020    BUN 24 2020    CREATININE 1.27 2020    GFRAA >60 2020    LABGLOM 50 2020    GLUCOSE 103 2020    PROT 7.1 2015    CALCIUM 9.0 2020    BILITOT 0.6 2018    ALKPHOS 62 2018    AST 13 2018    ALT 12 2018       POC Tests: No results for input(s): POCGLU, POCNA, POCK, POCCL, POCBUN, POCHEMO, POCHCT in the last 72 hours.     Coags:   Lab Results   Component Value Date    PROTIME 9.9 2020 INR 0.9 02/26/2020    APTT 28.3 02/26/2020       HCG (If Applicable): No results found for: PREGTESTUR, PREGSERUM, HCG, HCGQUANT     ABGs: No results found for: PHART, PO2ART, OVE8WUT, YAI2OBH, BEART, Y3URFLGS     Type & Screen (If Applicable):  No results found for: LABABO, LABRH    Drug/Infectious Status (If Applicable):  No results found for: HIV, HEPCAB    COVID-19 Screening (If Applicable):   Lab Results   Component Value Date    COVID19 Not Detected 08/20/2020           Anesthesia Evaluation  Patient summary reviewed and Nursing notes reviewed   history of anesthetic complications: PONV. Airway: Mallampati: II  TM distance: >3 FB   Neck ROM: full  Mouth opening: > = 3 FB Dental: normal exam         Pulmonary: breath sounds clear to auscultation                             Cardiovascular:    (+) hyperlipidemia        Rhythm: regular  Rate: normal                    Neuro/Psych:   (+) neuromuscular disease:,             GI/Hepatic/Renal:   (+) hiatal hernia, GERD:, renal disease: CRI, bowel prep,           Endo/Other: Negative Endo/Other ROS                    Abdominal:             Vascular: negative vascular ROS. Other Findings:             Anesthesia Plan      general     ASA 3       Induction: intravenous. Anesthetic plan and risks discussed with patient. Plan discussed with CRNA.                   Stephany Barrientos MD   3/18/2022

## 2022-03-21 LAB — SURGICAL PATHOLOGY REPORT: NORMAL

## 2022-04-18 ENCOUNTER — HOSPITAL ENCOUNTER (OUTPATIENT)
Dept: WOMENS IMAGING | Age: 72
Discharge: HOME | End: 2022-04-20
Payer: MEDICARE

## 2022-04-18 DIAGNOSIS — Z01.419 WELL WOMAN EXAM: ICD-10-CM

## 2022-04-18 DIAGNOSIS — Z12.31 ENCOUNTER FOR SCREENING MAMMOGRAM FOR MALIGNANT NEOPLASM OF BREAST: ICD-10-CM

## 2022-04-18 PROCEDURE — 77063 BREAST TOMOSYNTHESIS BI: CPT

## 2022-04-28 ENCOUNTER — HOSPITAL ENCOUNTER (OUTPATIENT)
Dept: CT IMAGING | Age: 72
Discharge: HOME | End: 2022-04-30
Payer: MEDICARE

## 2022-04-28 DIAGNOSIS — R10.30 LOWER ABDOMINAL PAIN: ICD-10-CM

## 2022-04-28 LAB
GFR NON-AFRICAN AMERICAN: 45 ML/MIN
GFR SERPL CREATININE-BSD FRML MDRD: 55 ML/MIN
GFR SERPL CREATININE-BSD FRML MDRD: ABNORMAL ML/MIN/{1.73_M2}
POC CREATININE: 1.18 MG/DL (ref 0.51–1.19)

## 2022-04-28 PROCEDURE — 82565 ASSAY OF CREATININE: CPT

## 2022-04-28 PROCEDURE — 2580000003 HC RX 258: Performed by: SURGERY

## 2022-04-28 PROCEDURE — 74177 CT ABD & PELVIS W/CONTRAST: CPT

## 2022-04-28 PROCEDURE — 6360000004 HC RX CONTRAST MEDICATION: Performed by: SURGERY

## 2022-04-28 RX ORDER — 0.9 % SODIUM CHLORIDE 0.9 %
80 INTRAVENOUS SOLUTION INTRAVENOUS ONCE
Status: COMPLETED | OUTPATIENT
Start: 2022-04-28 | End: 2022-04-28

## 2022-04-28 RX ORDER — SODIUM CHLORIDE 0.9 % (FLUSH) 0.9 %
10 SYRINGE (ML) INJECTION PRN
Status: DISCONTINUED | OUTPATIENT
Start: 2022-04-28 | End: 2022-05-01 | Stop reason: HOSPADM

## 2022-04-28 RX ADMIN — SODIUM CHLORIDE, PRESERVATIVE FREE 10 ML: 5 INJECTION INTRAVENOUS at 12:39

## 2022-04-28 RX ADMIN — IOPAMIDOL 75 ML: 755 INJECTION, SOLUTION INTRAVENOUS at 12:38

## 2022-04-28 RX ADMIN — SODIUM CHLORIDE 80 ML: 9 INJECTION, SOLUTION INTRAVENOUS at 12:39

## 2022-08-11 ENCOUNTER — HOSPITAL ENCOUNTER (OUTPATIENT)
Dept: CT IMAGING | Age: 72
Discharge: HOME | End: 2022-08-13
Payer: MEDICARE

## 2022-08-11 DIAGNOSIS — R59.1 LYMPHADENOPATHY: ICD-10-CM

## 2022-08-11 LAB
GFR NON-AFRICAN AMERICAN: 47 ML/MIN
GFR SERPL CREATININE-BSD FRML MDRD: 57 ML/MIN
GFR SERPL CREATININE-BSD FRML MDRD: ABNORMAL ML/MIN/{1.73_M2}
POC CREATININE: 1.13 MG/DL (ref 0.51–1.19)

## 2022-08-11 PROCEDURE — 74177 CT ABD & PELVIS W/CONTRAST: CPT

## 2022-08-11 PROCEDURE — 6360000004 HC RX CONTRAST MEDICATION: Performed by: SURGERY

## 2022-08-11 PROCEDURE — 82565 ASSAY OF CREATININE: CPT

## 2022-08-11 PROCEDURE — 2580000003 HC RX 258: Performed by: SURGERY

## 2022-08-11 RX ORDER — SODIUM CHLORIDE 0.9 % (FLUSH) 0.9 %
10 SYRINGE (ML) INJECTION PRN
Status: DISCONTINUED | OUTPATIENT
Start: 2022-08-11 | End: 2022-08-14 | Stop reason: HOSPADM

## 2022-08-11 RX ORDER — 0.9 % SODIUM CHLORIDE 0.9 %
100 INTRAVENOUS SOLUTION INTRAVENOUS ONCE
Status: COMPLETED | OUTPATIENT
Start: 2022-08-11 | End: 2022-08-11

## 2022-08-11 RX ADMIN — IOHEXOL 50 ML: 240 INJECTION, SOLUTION INTRATHECAL; INTRAVASCULAR; INTRAVENOUS; ORAL at 13:20

## 2022-08-11 RX ADMIN — SODIUM CHLORIDE, PRESERVATIVE FREE 10 ML: 5 INJECTION INTRAVENOUS at 13:17

## 2022-08-11 RX ADMIN — SODIUM CHLORIDE 100 ML: 9 INJECTION, SOLUTION INTRAVENOUS at 13:16

## 2022-08-11 RX ADMIN — IOPAMIDOL 75 ML: 755 INJECTION, SOLUTION INTRAVENOUS at 13:20

## 2022-08-15 ENCOUNTER — HOSPITAL ENCOUNTER (OUTPATIENT)
Age: 72
Setting detail: SPECIMEN
Discharge: HOME | End: 2022-08-15

## 2022-08-15 DIAGNOSIS — R53.83 FATIGUE, UNSPECIFIED TYPE: ICD-10-CM

## 2022-08-15 LAB
THYROXINE, FREE: 0.81 NG/DL (ref 0.93–1.7)
TSH SERPL DL<=0.05 MIU/L-ACNC: 2.45 UIU/ML (ref 0.3–5)

## 2022-08-18 LAB
EBV EARLY ANTIGEN IGG: 11 U/ML
EBV INTERPRETATION: ABNORMAL
EBV NUCLEAR AG AB: 171 U/ML
EPSTEIN-BARR VCA IGG: 293 U/ML
EPSTEIN-BARR VCA IGM: 13 U/ML

## 2022-08-29 ENCOUNTER — HOSPITAL ENCOUNTER (OUTPATIENT)
Dept: PREADMISSION TESTING | Age: 72
Discharge: HOME | End: 2022-09-02

## 2022-08-29 VITALS — BODY MASS INDEX: 38.15 KG/M2 | WEIGHT: 229 LBS | HEIGHT: 65 IN

## 2022-08-29 NOTE — PROGRESS NOTES

## 2022-09-09 ENCOUNTER — ANESTHESIA EVENT (OUTPATIENT)
Dept: ENDOSCOPY | Age: 72
End: 2022-09-09
Payer: MEDICARE

## 2022-09-12 ENCOUNTER — ANESTHESIA (OUTPATIENT)
Dept: ENDOSCOPY | Age: 72
End: 2022-09-12
Payer: MEDICARE

## 2022-09-12 ENCOUNTER — HOSPITAL ENCOUNTER (OUTPATIENT)
Age: 72
Setting detail: OUTPATIENT SURGERY
Discharge: HOME | End: 2022-09-12
Attending: SURGERY | Admitting: SURGERY
Payer: MEDICARE

## 2022-09-12 VITALS
SYSTOLIC BLOOD PRESSURE: 130 MMHG | DIASTOLIC BLOOD PRESSURE: 67 MMHG | BODY MASS INDEX: 37.85 KG/M2 | HEIGHT: 65 IN | OXYGEN SATURATION: 99 % | TEMPERATURE: 97.7 F | WEIGHT: 227.2 LBS | HEART RATE: 70 BPM | RESPIRATION RATE: 14 BRPM

## 2022-09-12 DIAGNOSIS — Z86.010 HISTORY OF COLON POLYPS: ICD-10-CM

## 2022-09-12 PROCEDURE — 6360000002 HC RX W HCPCS

## 2022-09-12 PROCEDURE — 3700000000 HC ANESTHESIA ATTENDED CARE: Performed by: SURGERY

## 2022-09-12 PROCEDURE — 3700000001 HC ADD 15 MINUTES (ANESTHESIA): Performed by: SURGERY

## 2022-09-12 PROCEDURE — 2500000003 HC RX 250 WO HCPCS

## 2022-09-12 PROCEDURE — 2500000003 HC RX 250 WO HCPCS: Performed by: ANESTHESIOLOGY

## 2022-09-12 PROCEDURE — 2709999900 HC NON-CHARGEABLE SUPPLY: Performed by: SURGERY

## 2022-09-12 PROCEDURE — 7100000001 HC PACU RECOVERY - ADDTL 15 MIN: Performed by: SURGERY

## 2022-09-12 PROCEDURE — A4216 STERILE WATER/SALINE, 10 ML: HCPCS | Performed by: ANESTHESIOLOGY

## 2022-09-12 PROCEDURE — 7100000000 HC PACU RECOVERY - FIRST 15 MIN: Performed by: SURGERY

## 2022-09-12 PROCEDURE — 7100000031 HC ASPR PHASE II RECOVERY - ADDTL 15 MIN: Performed by: SURGERY

## 2022-09-12 PROCEDURE — 88305 TISSUE EXAM BY PATHOLOGIST: CPT

## 2022-09-12 PROCEDURE — 7100000011 HC PHASE II RECOVERY - ADDTL 15 MIN: Performed by: SURGERY

## 2022-09-12 PROCEDURE — 3609010300 HC COLONOSCOPY W/BIOPSY SINGLE/MULTIPLE: Performed by: SURGERY

## 2022-09-12 PROCEDURE — 7100000030 HC ASPR PHASE II RECOVERY - FIRST 15 MIN: Performed by: SURGERY

## 2022-09-12 PROCEDURE — 2580000003 HC RX 258: Performed by: ANESTHESIOLOGY

## 2022-09-12 PROCEDURE — 7100000010 HC PHASE II RECOVERY - FIRST 15 MIN: Performed by: SURGERY

## 2022-09-12 RX ORDER — GLYCOPYRROLATE 0.2 MG/ML
INJECTION INTRAMUSCULAR; INTRAVENOUS PRN
Status: DISCONTINUED | OUTPATIENT
Start: 2022-09-12 | End: 2022-09-12 | Stop reason: SDUPTHER

## 2022-09-12 RX ORDER — LIDOCAINE HYDROCHLORIDE 10 MG/ML
INJECTION, SOLUTION EPIDURAL; INFILTRATION; INTRACAUDAL; PERINEURAL PRN
Status: DISCONTINUED | OUTPATIENT
Start: 2022-09-12 | End: 2022-09-12 | Stop reason: SDUPTHER

## 2022-09-12 RX ORDER — DEXAMETHASONE SODIUM PHOSPHATE 4 MG/ML
INJECTION, SOLUTION INTRA-ARTICULAR; INTRALESIONAL; INTRAMUSCULAR; INTRAVENOUS; SOFT TISSUE PRN
Status: DISCONTINUED | OUTPATIENT
Start: 2022-09-12 | End: 2022-09-12 | Stop reason: SDUPTHER

## 2022-09-12 RX ORDER — SODIUM CHLORIDE 0.9 % (FLUSH) 0.9 %
5-40 SYRINGE (ML) INJECTION EVERY 12 HOURS SCHEDULED
Status: DISCONTINUED | OUTPATIENT
Start: 2022-09-12 | End: 2022-09-12 | Stop reason: HOSPADM

## 2022-09-12 RX ORDER — PROPOFOL 10 MG/ML
INJECTION, EMULSION INTRAVENOUS PRN
Status: DISCONTINUED | OUTPATIENT
Start: 2022-09-12 | End: 2022-09-12 | Stop reason: SDUPTHER

## 2022-09-12 RX ORDER — ONDANSETRON 2 MG/ML
INJECTION INTRAMUSCULAR; INTRAVENOUS PRN
Status: DISCONTINUED | OUTPATIENT
Start: 2022-09-12 | End: 2022-09-12 | Stop reason: SDUPTHER

## 2022-09-12 RX ORDER — SODIUM CHLORIDE 0.9 % (FLUSH) 0.9 %
5-40 SYRINGE (ML) INJECTION PRN
Status: DISCONTINUED | OUTPATIENT
Start: 2022-09-12 | End: 2022-09-12 | Stop reason: HOSPADM

## 2022-09-12 RX ORDER — SODIUM CHLORIDE, SODIUM LACTATE, POTASSIUM CHLORIDE, CALCIUM CHLORIDE 600; 310; 30; 20 MG/100ML; MG/100ML; MG/100ML; MG/100ML
INJECTION, SOLUTION INTRAVENOUS CONTINUOUS
Status: DISCONTINUED | OUTPATIENT
Start: 2022-09-12 | End: 2022-09-12 | Stop reason: HOSPADM

## 2022-09-12 RX ORDER — PHENYLEPHRINE HYDROCHLORIDE 10 MG/ML
INJECTION INTRAVENOUS PRN
Status: DISCONTINUED | OUTPATIENT
Start: 2022-09-12 | End: 2022-09-12 | Stop reason: SDUPTHER

## 2022-09-12 RX ORDER — SODIUM CHLORIDE 9 MG/ML
INJECTION, SOLUTION INTRAVENOUS PRN
Status: DISCONTINUED | OUTPATIENT
Start: 2022-09-12 | End: 2022-09-12 | Stop reason: HOSPADM

## 2022-09-12 RX ORDER — LIDOCAINE HYDROCHLORIDE 10 MG/ML
1 INJECTION, SOLUTION EPIDURAL; INFILTRATION; INTRACAUDAL; PERINEURAL
Status: DISCONTINUED | OUTPATIENT
Start: 2022-09-12 | End: 2022-09-12 | Stop reason: HOSPADM

## 2022-09-12 RX ADMIN — ONDANSETRON 4 MG: 2 INJECTION INTRAMUSCULAR; INTRAVENOUS at 07:08

## 2022-09-12 RX ADMIN — GLYCOPYRROLATE 0.2 MG: 0.2 INJECTION, SOLUTION INTRAMUSCULAR; INTRAVENOUS at 07:38

## 2022-09-12 RX ADMIN — PROPOFOL 180 MG: 10 INJECTION, EMULSION INTRAVENOUS at 07:04

## 2022-09-12 RX ADMIN — FAMOTIDINE 20 MG: 10 INJECTION, SOLUTION INTRAVENOUS at 06:55

## 2022-09-12 RX ADMIN — LIDOCAINE HYDROCHLORIDE 50 MG: 10 INJECTION, SOLUTION EPIDURAL; INFILTRATION; INTRACAUDAL; PERINEURAL at 07:04

## 2022-09-12 RX ADMIN — SODIUM CHLORIDE, POTASSIUM CHLORIDE, SODIUM LACTATE AND CALCIUM CHLORIDE: 600; 310; 30; 20 INJECTION, SOLUTION INTRAVENOUS at 06:24

## 2022-09-12 RX ADMIN — PHENYLEPHRINE HYDROCHLORIDE 100 MCG: 10 INJECTION INTRAVENOUS at 07:46

## 2022-09-12 RX ADMIN — DEXAMETHASONE SODIUM PHOSPHATE 8 MG: 4 INJECTION, SOLUTION INTRAMUSCULAR; INTRAVENOUS at 07:08

## 2022-09-12 ASSESSMENT — ENCOUNTER SYMPTOMS
TROUBLE SWALLOWING: 0
SORE THROAT: 0
CONSTIPATION: 0
COUGH: 0
ANAL BLEEDING: 0
WHEEZING: 0
RHINORRHEA: 0
DIARRHEA: 0
BLOOD IN STOOL: 0
VOMITING: 0
SHORTNESS OF BREATH: 0
NAUSEA: 0
ABDOMINAL PAIN: 0

## 2022-09-12 ASSESSMENT — PAIN - FUNCTIONAL ASSESSMENT: PAIN_FUNCTIONAL_ASSESSMENT: NONE - DENIES PAIN

## 2022-09-12 NOTE — ANESTHESIA PRE PROCEDURE
mL/hr at 09/12/22 0624 New Bag at 09/12/22 0624    sodium chloride flush 0.9 % injection 5-40 mL  5-40 mL IntraVENous 2 times per day Koki Tan MD        sodium chloride flush 0.9 % injection 5-40 mL  5-40 mL IntraVENous PRN Koki Tan MD        0.9 % sodium chloride infusion   IntraVENous PRN Koki Tan MD           Allergies: Allergies   Allergen Reactions    Seasonal Other (See Comments)     STUFFY NOSE, FLUID IN EARS       Problem List:    Patient Active Problem List   Diagnosis Code    GERD (gastroesophageal reflux disease) K21.9    Fibromyalgia M79.7    Pure hypercholesterolemia E78.00    Osteoarthritis M19.90    Hypercholesterolemia E78.00    Hiatal hernia K44.9    CKD (chronic kidney disease) stage 3, GFR 30-59 ml/min (HCC) N18.30    Pancreatitis K85.90    [place]-Walker grade 2-3 cystocele N81.10    Overflow incontinence N39.490    Vaginal Cystocele Repair 8/24/20 N81.10    Bradycardia R00.1       Past Medical History:        Diagnosis Date    Anesthesia     woke up crying x1    Anesthesia complication     PATIENT WAKES UP CRYING AFTER ANESTHESIA    Cataract     BILATERAL EYES    CKD (chronic kidney disease) stage 3, GFR 30-59 ml/min (HCC)     Constipation     Fibromyalgia     Frequent stools     GERD (gastroesophageal reflux disease)     Hiatal hernia     High calcium levels     hx.  of , had\" lower left thyroid removed\"    History of blood transfusion 1990    AUTOLOGUS X1 AFTER HYSTERECTOMY    Hypercholesterolemia     OA (osteoarthritis) 10/28/2013    Parathyroid adenoma     PONV (postoperative nausea and vomiting)     Slow urinary stream     Snores     Wears glasses        Past Surgical History:        Procedure Laterality Date    APPENDECTOMY  1964    CARPAL TUNNEL RELEASE Bilateral     CHOLECYSTECTOMY      COLONOSCOPY      1.4.2011, 8.23.2011    COLONOSCOPY N/A 3/18/2022    COLONOSCOPY POLYPECTOMY SNARE/COLD BIOPSY performed by Ambika Caicedo MD at [facility]    CYST REMOVAL Left     GANGLION REMOVED 2 TIMES    ENDOSCOPY, COLON, DIAGNOSTIC      EGD    HYSTERECTOMY (CERVIX STATUS UNKNOWN)      UTERUS ONLY (GURU)    PARATHYROIDECTOMY  2018    UT EXPLORE PARATHYROID GLANDS Left 2018    PARATHYROIDECTOMY LOWER, NIM MONITOR performed by Rhiannon Michelle MD at [address] [place], [place] [address] ENDOSCOPY      2005, 2010    UPPER GASTROINTESTINAL ENDOSCOPY      VAGINA SURGERY N/A 2020    VAGINAL CYSTOCELE REPAIR performed by Samantha Bond DO at [facility] History:    Social History     Tobacco Use    Smoking status: Former     Packs/day: 2.00     Years: 20.00     Pack years: 40.00     Types: Cigarettes     Quit date: 1990     Years since quittin.1    Smokeless tobacco: Never   Substance Use Topics    Alcohol use: No                                Counseling given: Not Answered      Vital Signs (Current):   Vitals:    22 0602   BP: 120/72   Pulse: 73   Resp: 16   Temp: 97.7 °F (36.5 °C)   TempSrc: Infrared   SpO2: 97%   Weight: 227 lb 3.2 oz (103.1 kg)   Height: 5' 5\" (1.651 m)                                              BP Readings from Last 3 Encounters:   22 120/72   08/15/22 136/84   22 (!) 160/80       NPO Status: Time of last liquid consumption:                         Time of last solid consumption: 143                        Date of last liquid consumption: 22                        Date of last solid food consumption: 09/10/22    BMI:   Wt Readings from Last 3 Encounters:   22 227 lb 3.2 oz (103.1 kg)   22 229 lb (103.9 kg)   08/15/22 233 lb 6.4 oz (105.9 kg)     Body mass index is 37.81 kg/m².     CBC:   Lab Results   Component Value Date/Time    WBC 6.0 2020 12:00 AM    RBC 4.57 2020 12:00 AM    HGB 13.5 2020 12:00 AM    HCT 41.2 2020 12:00 AM    MCV 90 2020 12:00 AM    RDW 14.1 11/09/2020 12:00 AM     11/09/2020 12:00 AM       CMP:   Lab Results   Component Value Date/Time     11/09/2020 12:00 AM    K 4.7 11/09/2020 12:00 AM     11/09/2020 12:00 AM    CO2 28 11/09/2020 12:00 AM    BUN 24 11/09/2020 12:00 AM    CREATININE 1.13 08/11/2022 01:13 PM    CREATININE 1.27 11/09/2020 12:00 AM    GFRAA >60 08/17/2020 10:05 AM    LABGLOM 47 08/11/2022 01:13 PM    GLUCOSE 103 08/17/2020 10:05 AM    PROT 7.1 05/04/2015 02:18 PM    CALCIUM 9.0 11/09/2020 12:00 AM    BILITOT 0.6 04/06/2018 12:00 AM    ALKPHOS 62 04/06/2018 12:00 AM    AST 13 04/06/2018 12:00 AM    ALT 12 04/06/2018 12:00 AM       POC Tests: No results for input(s): POCGLU, POCNA, POCK, POCCL, POCBUN, POCHEMO, POCHCT in the last 72 hours. Coags:   Lab Results   Component Value Date/Time    PROTIME 9.9 02/26/2020 09:46 AM    INR 0.9 02/26/2020 09:46 AM    APTT 28.3 02/26/2020 09:46 AM       HCG (If Applicable): No results found for: PREGTESTUR, PREGSERUM, HCG, HCGQUANT     ABGs: No results found for: PHART, PO2ART, IRJ5VOK, UDS0FJK, BEART, F7MQUIKJ     Type & Screen (If Applicable):  No results found for: LABABO, LABRH    Drug/Infectious Status (If Applicable):  No results found for: HIV, HEPCAB    COVID-19 Screening (If Applicable):   Lab Results   Component Value Date/Time    COVID19 Not Detected 08/20/2020 07:03 AM           Anesthesia Evaluation  Patient summary reviewed and Nursing notes reviewed   history of anesthetic complications (PATIENT WAKES UP CRYING AFTER ANESTHESIA): PONV.   Airway: Mallampati: III  TM distance: >3 FB     Mouth opening: > = 3 FB   Dental: normal exam         Pulmonary:Negative Pulmonary ROS and normal exam  breath sounds clear to auscultation                             Cardiovascular:    (+) hypertension:, hyperlipidemia        Rhythm: regular  Rate: normal                    Neuro/Psych:   (+) neuromuscular disease:,              ROS comment: Fibromyalgia GI/Hepatic/Renal:   (+) hiatal hernia, GERD:, renal disease: CRI, morbid obesity          Endo/Other:    (+) : arthritis: OA., .                 Abdominal:             Vascular: negative vascular ROS. Other Findings:           Anesthesia Plan      general     ASA 3       Induction: intravenous. MIPS: Prophylactic antiemetics administered. Anesthetic plan and risks discussed with patient. Plan discussed with CRNA.                     Sulema Mendoza MD   9/12/2022

## 2022-09-12 NOTE — H&P
HISTORY and [id number]       NAME:  Cipriano Patel  MRN: [id number]   YOB: 1950   Date: 9/12/2022   Age: 67 y.o. Gender: female     COMPLAINT AND PRESENT HISTORY:   Cipriano Patel is 67 y.o.,  female, undergoing COLONOSCOPY DIAGNOSTIC for HISTORY OF COLON POLYPS.    COLONOSCOPY QUESTIONNAIRE  LAST COLONOSCOPY: March 2022  HISTORY OF COLON POLYPS: Yes   ABD PAIN: Denies   CONSTIPATION: Yes   DIARRHEA (ASIDE FROM COLONOSCOPY PREP): Yes   BLOOD IN STOOL/BLACK, TARRY STOOLS: Denies   NAUSEA/VOMITING/HEMATEMESIS: Denies   FEVER/CHILLS: Denies   APPETITE CHANGE: Denies   RECENT UNINTENDED WEIGHT LOSS: Denies   DIFFICULTY SWALLOWING/PHARYNGITIS/VOICE CHANGE: Denies      Patient reports completing bowel prep without complications, last BM reported this morning- they state last BM was mostly clear in consistency. Review of additional significant medical hx (see chart for additional detail, including current medications): CKD, HTN, HLD  BP Readings from Last 3 Encounters:   08/15/22 136/84   07/28/22 (!) 160/80   03/18/22 (!) 105/55        Patient states they have been NPO since before midnight. Medications taken TODAY (with sip of water): losartan  Patient denies taking any anti-coagulants, including aspirin currently. Pertinent family hx: Maternal grand mother had colon cancer     Denies personal hx of blood clots. Denies personal hx of MRSA infection.   Denies any personal or family hx of previous complications w/anesthesia other than post-op nausea and vomiting, and \"waking up crying\"   PAST MEDICAL HISTORY     Past Medical History:   Diagnosis Date    Anesthesia     woke up crying x1    Anesthesia complication     PATIENT WAKES UP CRYING AFTER ANESTHESIA    Cataract     BILATERAL EYES    CKD (chronic kidney disease) stage 3, GFR 30-59 ml/min ([facility])     Constipation     Fibromyalgia     Frequent stools     GERD (gastroesophageal reflux disease)     Hiatal hernia     High calcium levels     hx.  of , had\" lower left thyroid removed\"    History of blood transfusion     AUTOLOGUS X1 AFTER HYSTERECTOMY    Hypercholesterolemia     OA (osteoarthritis) 10/28/2013    Parathyroid adenoma     PONV (postoperative nausea and vomiting)     Slow urinary stream     Snores     Wears glasses        SURGICAL HISTORY       Past Surgical History:   Procedure Laterality Date    APPENDECTOMY  1964    CARPAL TUNNEL RELEASE Bilateral     CHOLECYSTECTOMY      COLONOSCOPY      1.4., 8.23.    COLONOSCOPY N/A 3/18/2022    COLONOSCOPY POLYPECTOMY SNARE/COLD BIOPSY performed by Da Lantigua MD at [facility] 2 TIMES    ENDOSCOPY, COLON, DIAGNOSTIC      EGD    HYSTERECTOMY (CERVIX STATUS UNKNOWN)  [address] (GURU)    PARATHYROIDECTOMY  2018    HI EXPLORE PARATHYROID GLANDS Left 2018    PARATHYROIDECTOMY LOWER, NIM MONITOR performed by Chrsitie Bah MD at [address]      7.., ..    UPPER GASTROINTESTINAL ENDOSCOPY      VAGINA SURGERY N/A 2020    VAGINAL CYSTOCELE REPAIR performed by Nael Baldwin DO at [address] History     Socioeconomic History    Marital status:    Tobacco Use    Smoking status: Former     Packs/day: 2.00     Years: 20.00     Pack years: 40.00     Types: Cigarettes     Quit date: 1990     Years since quittin.1    Smokeless tobacco: Never   Vaping Use    Vaping Use: Never used   Substance and Sexual Activity    Alcohol use: No    Drug use: No     Social Determinants of Health     Financial Resource Strain: Low Risk     Difficulty of Paying Living Expenses: Not hard at all   Food Insecurity: No Food Insecurity    Worried About Running Out of Food in the Last Year: Never true    Ran Out of Food in the Last Year: Never true       REVIEW OF SYSTEMS Allergies   Allergen Reactions    Seasonal Other (See Comments)     STUFFY NOSE, FLUID IN EARS       No current facility-administered medications on file prior to encounter. Current Outpatient Medications on File Prior to Encounter   Medication Sig Dispense Refill    Multiple Vitamins-Minerals (THERAPEUTIC MULTIVITAMIN-MINERALS) tablet Take 1 tablet by mouth daily Women's multi vitamin daily      acetaminophen (TYLENOL) 500 MG tablet Take 1,000 mg by mouth 2 times daily as needed       DiphenhydrAMINE HCl (BENADRYL ALLERGY PO) Take 25 mg by mouth every 6 hours as needed Takes 1 -2  At a time      Handicap Placard [place] [facility] [place], [place] It is my medical opinin that Екатерина Saha requires a disability parking placard for the following reasons:  She has limited walking ability due to an arthritic condition. Duration of need: permanent 1 each 0     (Notation: Medications listed above are not currently reconciled at the signing of this H&P note, to be reconciled in pre-op per RN)    Review of Systems   Constitutional:  Negative for chills and fever. HENT:  Negative for congestion, ear pain, postnasal drip, rhinorrhea, sore throat and trouble swallowing. Respiratory:  Negative for cough, shortness of breath and wheezing. Cardiovascular:  Negative for chest pain, palpitations and leg swelling. Gastrointestinal:  Negative for abdominal pain, anal bleeding, blood in stool, constipation, diarrhea, nausea and vomiting. See HPI. Genitourinary:  Negative for dysuria and frequency. Skin:  Negative for rash. Allergic/Immunologic: Positive for environmental allergies. Neurological:  Negative for dizziness and headaches. Hematological:  Does not bruise/bleed easily. GENERAL PHYSICAL EXAM     Vitals: Review current vital signs per RN flow sheet. GENERAL APPEARANCE:   Tita Whitehead is 67 y.o.,  female, moderately obese, nourished, conscious, alert.   Does not appear to be in any distress or pain at this time. Physical Exam  Constitutional:       General: She is not in acute distress. Appearance: She is obese. HENT:      Head: Normocephalic. Nose: Nose normal.      Mouth/Throat:      Mouth: Mucous membranes are moist.      Pharynx: Oropharynx is clear. Eyes:      Extraocular Movements: Extraocular movements intact. Pupils: Pupils are equal, round, and reactive to light. Cardiovascular:      Rate and Rhythm: Normal rate and regular rhythm. Pulmonary:      Effort: Pulmonary effort is normal.      Breath sounds: Normal breath sounds. Abdominal:      General: Abdomen is flat. Bowel sounds are normal.   Musculoskeletal:         General: Normal range of motion. Cervical back: Normal range of motion. Skin:     General: Skin is warm and dry. Findings: No bruising. Neurological:      General: No focal deficit present. Mental Status: She is alert and oriented to person, place, and time. Mental status is at baseline.    Psychiatric:         Mood and Affect: Mood normal.       RECENT LAB WORK     Lab Results   Component Value Date     11/09/2020    K 4.7 11/09/2020     11/09/2020    CO2 28 11/09/2020    BUN 24 11/09/2020    CREATININE 1.13 08/11/2022    GLUCOSE 103 (H) 08/17/2020    CALCIUM 9.0 11/09/2020    PROT 7.1 05/04/2015    LABALBU 4.4 04/06/2018    BILITOT 0.6 04/06/2018    ALKPHOS 62 04/06/2018    AST 13 04/06/2018    ALT 12 04/06/2018    LABGLOM 47 (L) 08/11/2022    GFRAA >60 08/17/2020     Lab Results   Component Value Date    WBC 6.0 11/09/2020    HGB 13.5 11/09/2020    HCT 41.2 11/09/2020    MCV 90 11/09/2020     11/09/2020     PROVISIONAL DIAGNOSES / SURGERY:      HISTORY OF COLON POLYPS    COLONOSCOPY DIAGNOSTIC    Patient Active Problem List    Diagnosis Date Noted    [place]-Walker grade 2-3 cystocele 01/07/2020    Overflow incontinence 01/07/2020    Bradycardia 09/10/2020    Vaginal Cystocele Repair 8/24/20 08/24/2020    Pancreatitis 09/12/2018    Osteoarthritis     Hypercholesterolemia     Hiatal hernia     CKD (chronic kidney disease) stage 3, GFR 30-59 ml/min ([facility])     Pure hypercholesterolemia 04/13/2018    Fibromyalgia 10/28/2013    GERD (gastroesophageal reflux disease) 07/06/2012           TOI PERRY - CNP on 9/12/2022 at 5:35 AM

## 2022-09-12 NOTE — OP NOTE
Operative Note      Patient: Ambrose Gonzales  YOB: 1950  MRN: [id number]    PROCEDURE NOTE    DATE OF PROCEDURE: 9/12/2022    SURGEON: Juliana Cash MD    ASSISTANT: None    PREOPERATIVE DIAGNOSIS: Possible mass and ileocecal valve    POSTOPERATIVE DIAGNOSIS: Grade 1 internal hemorrhoid. Sigmoid polyp. Sigmoid diverticulosis. Ascending colon polyp. Cecal polyp. Very prominent prolapse lipomatous ileocecal valve versus mass lesion ileocecal valve. Fair prep. OPERATION: Total colonoscopy to cecum with intubation of terminal ileum. Cecal, ascending and sigmoid polypectomy with large cold biopsy forceps. Multiple biopsies ileocecal valve region. ANESTHESIA: General    ESTIMATED BLOOD LOSS: None    COMPLICATIONS: None     SPECIMENS:  Was Obtained: Cecal polyp. Multiple biopsies ileocecal valve region. Ascending colon polyp. Sigmoid polyp. HISTORY: The patient is a 67y.o. year old female with history of above preop diagnosis. I recommended colonoscopy with possible biopsy or polypectomy and I explained the risk, benefits, expected outcome, and alternatives to the procedure. Risks included but are not limited to bleeding, infection, respiratory distress, hypotension, and perforation of the colon and possibility of missing a lesion. The patient understands and is in agreement. PROCEDURE: The patient was given IV conscious sedation. The patient's SPO2 remained above 90% throughout the procedure. Digital rectal exam was normal.  The colonoscope was inserted through the anus into the rectum and advanced under direct vision to the cecum without difficulty. Terminal ileum was examined for approximately 2 inches. The prep was fair. Findings:  Terminal ileum: normal    Cecum/Ascending colon: abnormal: Cecal polyp and ascending colon polyp removed with cold biopsy forceps. Very prominent prolapsed lipomatous ileocecal valve versus ?   Infiltrative polypoid mass within the ileocecal valve multiple biopsies obtained. Transverse colon: normal    Descending/Sigmoid colon: abnormal: Sigmoid diverticulosis. Sigmoid polyp removed with cold biopsy forceps    Rectum/Anus: examined in normal and retroflexed positions and was abnormal: Grade 1 internal hemorrhoid    Withdrawal Time was (minutes): 22        The colon was decompressed. While withdrawing the scope the above findings were verified and the scope was removed. The patient tolerated the procedure and conscious sedation without unusual events. In the recovery room patient was examined and remains hemodynamically stable. Discharge home when criteria met. Recommendations/Plan:   F/U Biopsies  F/U In Office as instructed  Discussed with the family  High fiber diet   Precautions to avoid constipation  CT scan of the abdomen and pelvis done back in April 22, 2022 reveals no direct evidence for acute infective or inflammatory process. Nonobstructing right renal stone. Few subcentimeter mesenteric lymph nodes in the region of terminal ileum follow-up was recommended. Patient subsequently had another CT scan done on June 21, 2022 that revealed stable subcentimeter anterior centimeter short axis ileocolonic upper abdominal lymph nodes almost certainly reactive. Nonobstructive kidney stone on the right side. In terms of the bowel it was nondilated without wall thickening. Given the CT findings I will wait for the pathology results and then discuss treatment options with the patient regarding close follow-up with colonoscopy versus surgical intervention. I will go over the pros and cons of each option and after discussion with the patient we will make the decision together.     Electronically signed by Donnell Paris MD  on 9/12/2022 at 7:55 AM

## 2022-09-12 NOTE — ANESTHESIA POSTPROCEDURE EVALUATION
Department of Anesthesiology  Postprocedure Note    Patient: Reena Roger  MRN: [id number]  YOB: 1950  Date of evaluation: 9/12/2022      Procedure Summary     Date: 09/12/22 Room / Location: [place] [address] 03 / [facility]    Anesthesia Start: 0527 Anesthesia Stop: 2636    Procedure: COLONOSCOPY WITH BIOPSY Diagnosis:       History of colon polyps      (HISTORY OF COLON POLYPS)    Surgeons: Dawit Mina MD Responsible Provider: Starla Hirsch MD    Anesthesia Type: general ASA Status: 3          Anesthesia Type: No value filed.     Rich Phase I: Rich Score: 10    Rich Phase II: Rich Score: 10      Anesthesia Post Evaluation    Comments: POST- ANESTHESIA EVALUATION       Pt Name: Reena Roger  MRN: [id number]  YOB: 1950  Date of evaluation: 9/12/2022  Time:  9:28 AM      /67   Pulse 70   Temp 97.7 °F (36.5 °C) (Infrared)   Resp 14   Ht 5' 5\" (1.651 m)   Wt 227 lb 3.2 oz (103.1 kg)   SpO2 99%   BMI 37.81 kg/m²      Consciousness Level  Awake  Cardiopulmonary Status  Stable  Pain Adequately Treated YES  Nausea / Vomiting  NO  Adequate Hydration  YES  Anesthesia Related Complications NONE      Electronically signed by Starla Hirsch MD on 9/12/2022 at 9:28 AM

## 2022-09-13 LAB — SURGICAL PATHOLOGY REPORT: NORMAL

## 2022-10-17 ENCOUNTER — HOSPITAL ENCOUNTER (OUTPATIENT)
Dept: CT IMAGING | Age: 72
Discharge: HOME | End: 2022-10-19
Payer: MEDICARE

## 2022-10-17 DIAGNOSIS — D12.0 BENIGN NEOPLASM OF CECUM: ICD-10-CM

## 2022-10-17 LAB
EGFR, POC: 57 ML/MIN/1.73M2
POC CREATININE: 1.04 MG/DL (ref 0.51–1.19)

## 2022-10-17 PROCEDURE — 74177 CT ABD & PELVIS W/CONTRAST: CPT

## 2022-10-17 PROCEDURE — 6360000004 HC RX CONTRAST MEDICATION: Performed by: SURGERY

## 2022-10-17 PROCEDURE — 2580000003 HC RX 258: Performed by: SURGERY

## 2022-10-17 PROCEDURE — 82565 ASSAY OF CREATININE: CPT

## 2022-10-17 PROCEDURE — 2500000003 HC RX 250 WO HCPCS: Performed by: SURGERY

## 2022-10-17 RX ORDER — SODIUM CHLORIDE 0.9 % (FLUSH) 0.9 %
10 SYRINGE (ML) INJECTION PRN
Status: DISCONTINUED | OUTPATIENT
Start: 2022-10-17 | End: 2022-10-20 | Stop reason: HOSPADM

## 2022-10-17 RX ORDER — 0.9 % SODIUM CHLORIDE 0.9 %
100 INTRAVENOUS SOLUTION INTRAVENOUS ONCE
Status: COMPLETED | OUTPATIENT
Start: 2022-10-17 | End: 2022-10-17

## 2022-10-17 RX ADMIN — BARIUM SULFATE 1350 ML: 1 SUSPENSION ORAL at 11:21

## 2022-10-17 RX ADMIN — SODIUM CHLORIDE 100 ML: 9 INJECTION, SOLUTION INTRAVENOUS at 11:23

## 2022-10-17 RX ADMIN — SODIUM CHLORIDE, PRESERVATIVE FREE 10 ML: 5 INJECTION INTRAVENOUS at 11:23

## 2022-10-17 RX ADMIN — IOPAMIDOL 100 ML: 755 INJECTION, SOLUTION INTRAVENOUS at 11:21

## 2022-12-28 ENCOUNTER — HOSPITAL ENCOUNTER (OUTPATIENT)
Age: 72
Setting detail: SPECIMEN
Discharge: HOME | End: 2022-12-28

## 2022-12-28 DIAGNOSIS — N18.32 STAGE 3B CHRONIC KIDNEY DISEASE (HCC): ICD-10-CM

## 2022-12-28 DIAGNOSIS — E55.9 VITAMIN D DEFICIENCY: ICD-10-CM

## 2022-12-28 DIAGNOSIS — E78.00 PURE HYPERCHOLESTEROLEMIA: ICD-10-CM

## 2022-12-28 DIAGNOSIS — R73.9 HYPERGLYCEMIA: ICD-10-CM

## 2022-12-28 LAB
ABSOLUTE EOS #: 0.24 K/UL (ref 0–0.44)
ABSOLUTE IMMATURE GRANULOCYTE: <0.03 K/UL (ref 0–0.3)
ABSOLUTE LYMPH #: 2.15 K/UL (ref 1.1–3.7)
ABSOLUTE MONO #: 0.6 K/UL (ref 0.1–1.2)
ALBUMIN SERPL-MCNC: 4.2 G/DL (ref 3.5–5.2)
ALBUMIN/GLOBULIN RATIO: 1.4 (ref 1–2.5)
ALP BLD-CCNC: 58 U/L (ref 35–104)
ALT SERPL-CCNC: 42 U/L (ref 5–33)
ANION GAP SERPL CALCULATED.3IONS-SCNC: 9 MMOL/L (ref 9–17)
AST SERPL-CCNC: 32 U/L
BASOPHILS # BLD: 1 % (ref 0–2)
BASOPHILS ABSOLUTE: 0.08 K/UL (ref 0–0.2)
BILIRUB SERPL-MCNC: 0.6 MG/DL (ref 0.3–1.2)
BUN BLDV-MCNC: 17 MG/DL (ref 8–23)
CALCIUM SERPL-MCNC: 9.1 MG/DL (ref 8.6–10.4)
CHLORIDE BLD-SCNC: 108 MMOL/L (ref 98–107)
CHOLESTEROL/HDL RATIO: 2.9
CHOLESTEROL: 157 MG/DL
CO2: 25 MMOL/L (ref 20–31)
CREAT SERPL-MCNC: 1 MG/DL (ref 0.5–0.9)
EOSINOPHILS RELATIVE PERCENT: 3 % (ref 1–4)
ESTIMATED AVERAGE GLUCOSE: 134 MG/DL
GFR SERPL CREATININE-BSD FRML MDRD: 60 ML/MIN/1.73M2
GLUCOSE BLD-MCNC: 113 MG/DL (ref 70–99)
HBA1C MFR BLD: 6.3 % (ref 4–6)
HCT VFR BLD CALC: 43 % (ref 36.3–47.1)
HDLC SERPL-MCNC: 54 MG/DL
HEMOGLOBIN: 13.5 G/DL (ref 11.9–15.1)
IMMATURE GRANULOCYTES: 0 %
LDL CHOLESTEROL: 88 MG/DL (ref 0–130)
LYMPHOCYTES # BLD: 27 % (ref 24–43)
MCH RBC QN AUTO: 29.7 PG (ref 25.2–33.5)
MCHC RBC AUTO-ENTMCNC: 31.4 G/DL (ref 28.4–34.8)
MCV RBC AUTO: 94.7 FL (ref 82.6–102.9)
MONOCYTES # BLD: 8 % (ref 3–12)
NRBC AUTOMATED: 0 PER 100 WBC
PDW BLD-RTO: 13.6 % (ref 11.8–14.4)
PLATELET # BLD: 164 K/UL (ref 138–453)
PMV BLD AUTO: 11.2 FL (ref 8.1–13.5)
POTASSIUM SERPL-SCNC: 4.9 MMOL/L (ref 3.7–5.3)
RBC # BLD: 4.54 M/UL (ref 3.95–5.11)
SEG NEUTROPHILS: 61 % (ref 36–65)
SEGMENTED NEUTROPHILS ABSOLUTE COUNT: 4.86 K/UL (ref 1.5–8.1)
SODIUM BLD-SCNC: 142 MMOL/L (ref 135–144)
TOTAL PROTEIN: 7.3 G/DL (ref 6.4–8.3)
TRIGL SERPL-MCNC: 77 MG/DL
VITAMIN D 25-HYDROXY: 33.1 NG/ML
WBC # BLD: 8 K/UL (ref 3.5–11.3)

## 2023-03-17 ENCOUNTER — OFFICE VISIT (OUTPATIENT)
Dept: OBGYN CLINIC | Age: 73
End: 2023-03-17
Payer: MEDICARE

## 2023-03-17 VITALS
BODY MASS INDEX: 36.32 KG/M2 | WEIGHT: 218 LBS | HEIGHT: 65 IN | SYSTOLIC BLOOD PRESSURE: 118 MMHG | DIASTOLIC BLOOD PRESSURE: 80 MMHG

## 2023-03-17 DIAGNOSIS — Z12.31 ENCOUNTER FOR SCREENING MAMMOGRAM FOR MALIGNANT NEOPLASM OF BREAST: ICD-10-CM

## 2023-03-17 DIAGNOSIS — Z78.0 POST-MENOPAUSAL: ICD-10-CM

## 2023-03-17 DIAGNOSIS — Z01.419 WELL WOMAN EXAM: Primary | ICD-10-CM

## 2023-03-17 PROCEDURE — 99397 PER PM REEVAL EST PAT 65+ YR: CPT | Performed by: NURSE PRACTITIONER

## 2023-03-17 ASSESSMENT — PATIENT HEALTH QUESTIONNAIRE - PHQ9
SUM OF ALL RESPONSES TO PHQ QUESTIONS 1-9: 0
SUM OF ALL RESPONSES TO PHQ QUESTIONS 1-9: 0
2. FEELING DOWN, DEPRESSED OR HOPELESS: 0
1. LITTLE INTEREST OR PLEASURE IN DOING THINGS: 0
SUM OF ALL RESPONSES TO PHQ QUESTIONS 1-9: 0
SUM OF ALL RESPONSES TO PHQ QUESTIONS 1-9: 0
SUM OF ALL RESPONSES TO PHQ9 QUESTIONS 1 & 2: 0

## 2023-03-17 NOTE — PROGRESS NOTES
History and Physical  [address], [address] [place] [address], Aliza Coleman 81. [phone]   Fax [phone]  Ridge Cole  3/17/2023              67 y.o. Chief Complaint   Patient presents with    Annual Exam       No LMP recorded. Patient has had a hysterectomy. Primary Care Physician: Bobbi Martinez MD    The patient was seen and examined. She has no chief complaint today and is here for her annual exam.  Her bowels are regular. There are no voiding complaints. She denies any bloating. She denies vaginal discharge and was counseled on STD's and the need for barrier contraception. HPI : Ridge Cole is a 67 y.o. female     Annual exam  Hx of hysterectomy for heavy menses in   Denies hx of abnormal pap smears. no Bloating  no Early Satiety  no Unexplained weight change of more than 15 lbs  no  PMB  N/A  PCB  ________________________________________________________________________  OB History    Para Term  AB Living   1 1 1 0 0 1   SAB IAB Ectopic Molar Multiple Live Births   0 0 0 0 0 1      # Outcome Date GA Lbr Chang/2nd Weight Sex Delivery Anes PTL Lv   1 Term 5    F Vag-Spont   PANDA     Past Medical History:   Diagnosis Date    Anesthesia     woke up crying x1    Anesthesia complication     PATIENT WAKES UP CRYING AFTER ANESTHESIA    Cataract     BILATERAL EYES    CKD (chronic kidney disease) stage 3, GFR 30-59 ml/min ([facility])     Constipation     Fibromyalgia     Frequent stools     GERD (gastroesophageal reflux disease)     Hiatal hernia     High calcium levels     hx.  of , had\" lower left thyroid removed\"    History of blood transfusion     AUTOLOGUS X1 AFTER HYSTERECTOMY    Hypercholesterolemia     OA (osteoarthritis) 10/28/2013    Parathyroid adenoma     PONV (postoperative nausea and vomiting)     Slow urinary stream     Snores     Wears glasses Past Surgical History:   Procedure Laterality Date    84 [facility] Bilateral     CHOLECYSTECTOMY      COLONOSCOPY      1.4., 8..    COLONOSCOPY N/A 3/18/2022    COLONOSCOPY POLYPECTOMY SNARE/COLD BIOPSY performed by Shoshana Corey MD [address] 2022    COLONOSCOPY WITH BIOPSY performed by Shoshana Corey MD at [address] 2 TIMES    ENDOSCOPY, COLON, DIAGNOSTIC      EGD    HYSTERECTOMY (CERVIX STATUS UNKNOWN)      UTERUS ONLY (GURU)    PARATHYROIDECTOMY  2018    DC PARATHYROIDECTOMY/EXPLORATION PARATHYROIDS Left 2018    PARATHYROIDECTOMY LOWER, NIM MONITOR performed by Sherman Connell MD at [address]      2005, 2010    UPPER GASTROINTESTINAL ENDOSCOPY      VAGINA SURGERY N/A 2020    VAGINAL CYSTOCELE REPAIR performed by Sheila Ferguson DO at [facility] OR     Family History   Problem Relation Age of Onset    Diabetes Mother     Heart Disease Mother     High Blood Pressure Mother     Heart Disease Father     Diabetes Father         RESOLVED WITH WEIGHT LOSS    High Blood Pressure Father     Kidney Disease Father     Cancer Maternal Grandmother         colon, uterine, breast    Diabetes Maternal Grandmother     Breast Cancer Maternal Grandmother      Social History     Socioeconomic History    Marital status:      Spouse name: Not on file    Number of children: Not on file    Years of education: Not on file    Highest education level: Not on file   Occupational History    Not on file   Tobacco Use    Smoking status: Former     Packs/day: 2.00     Years: 20.00     Pack years: 40.00     Types: Cigarettes     Quit date: 1990     Years since quittin.6    Smokeless tobacco: Never   Vaping Use    Vaping Use: Never used   Substance and Sexual Activity    Alcohol use: No    Drug use:  No Sexual activity: Not on file   Other Topics Concern    Not on file   Social History Narrative    Not on file     Social Determinants of Health     Financial Resource Strain: Low Risk     Difficulty of Paying Living Expenses: Not hard at all   Food Insecurity: No Food Insecurity    Worried About Running Out of Food in the Last Year: Never true    Ran Out of Food in the Last Year: Never true   Transportation Needs: Not on file   Physical Activity: Insufficiently Active    Days of Exercise per Week: 2 days    Minutes of Exercise per Session: 20 min   Stress: Not on file   Social Connections: Not on file   Intimate Partner Violence: Not on file   Housing Stability: Not on file       MEDICATIONS:  Current Outpatient Medications   Medication Sig Dispense Refill    omeprazole (PRILOSEC) 20 MG delayed release capsule Take 1 capsule by mouth twice daily 180 capsule 2    atorvastatin (LIPITOR) 10 MG tablet Take 1 tablet by mouth once daily 90 tablet 2    Handicap Placard MISC by Does not apply route It is my medical opinin that Jaden Tucker requires a disability parking placard for the following reasons:   She has limited walking ability due to an arthritic condition.    Duration of need: permanent 1 each 0    tiZANidine (ZANAFLEX) 4 MG tablet TAKE 1 TABLET BY MOUTH THREE TIMES DAILY AS NEEDED FOR  MUSCLE  PAIN 30 tablet 2    losartan (COZAAR) 25 MG tablet TAKE 1 TABLET BY MOUTH IN THE MORNING 90 tablet 0    Multiple Vitamins-Minerals (THERAPEUTIC MULTIVITAMIN-MINERALS) tablet Take 1 tablet by mouth daily Women's multi vitamin daily      acetaminophen (TYLENOL) 500 MG tablet Take 1,000 mg by mouth 2 times daily as needed       DiphenhydrAMINE HCl (BENADRYL ALLERGY PO) Take 25 mg by mouth every 6 hours as needed Takes 1 -2  At a time      Handicap Placard MISC It is my medical opinin that Jaden Tucker requires a disability parking placard for the following reasons:  She has limited walking ability due to an arthritic condition. Duration of need: permanent 1 each 0     No current facility-administered medications for this visit. ALLERGIES:  Allergies as of 03/17/2023 - Fully Reviewed 03/17/2023   Allergen Reaction Noted    Seasonal Other (See Comments) 08/28/2018       Symptoms of decreased mood absent  Symptoms of anhedonia absent    **If either question is answered in a  positive fashion then complete the PHQ9 Scoring Evaluation and make the appropriate referral**      Immunization status: stated as current, but no records available. Gynecologic History:  Menarche: 7 yo  Menopause at hysterectomy yo     No LMP recorded. Patient has had a hysterectomy. Sexually Active: No    STD History: No     Permanent Sterilization: Yes hyst   Reversible Birth Control: No        Hormone Replacement Exposure: Yes premarin x 1 year. Genetic Qualified Family History of Breast, Ovarian , Colon or Uterine Cancer: No (maternal grandmother with uterine, breast, colon cancer) older than age 48 with diagnosis. If YES see scanned worksheet. Preventative Health Testing:    Health Maintenance: There are no preventive care reminders to display for this patient. Date of Last Pap Smear:   Abnormal Pap Smear History: denies  Colposcopy History:   Date of Last Mammogram: 4/18/2022 neg  Date of Last Colonoscopy: 3/2022 colonoscopy, then repeat 9/12/2022 colonoscopy with DR Ed Hidalgo - colon polyp- plans to follow up with GI  Date of Last Bone Density:4/15/2021 normal      ________________________________________________________________________        REVIEW OF SYSTEMS:    yes   A minimum of an eleven point review of systems was completed. Review Of Systems (11 point):  Constitutional: No fever, chills or malaise;  No weight change or fatigue  Head and Eyes: No vision changes, Headache, Dizziness or trauma in last 12 months  ENT ROS: No hearing, Tinnitis, sinus or taste problems  Hematological and Lymphatic ROS:No Lymphoma, Von Willebrand's, Hemophillia or Bleeding History. + CKD  Psych ROS: No Depression, Homicidal thoughts,suicidal thoughts, or anxiety  Breast ROS: No breast abnormalities or lumps  Respiratory ROS: No SOB, Pneumoniae,Cough, or Pulmonary Embolism   Cardiovascular ROS: No Chest Pain with Exertion, Palpitations, Syncope, Edema, Arrhythmia  Gastrointestinal ROS: No Indigestion, Heartburn, Nausea, vomiting, Diarrhea, Constipation,or Bowel Changes; No Bloody Stools or melena  Genito-Urinary ROS: No Dysuria, Hematuria or Nocturia. No Urinary Incontinence or Vaginal Discharge  Musculoskeletal ROS: No Arthralgia, Arthritis,Gout,Osteoporosis or Rheumatism  Neurological ROS: No CVA, Migraines, Epilepsy, Seizure Hx, or Limb Weakness  Dermatological ROS: No Rash, Itching, Hives, Mole Changes or Cancer                                                                                                                                                                                                                                  PHYSICAL Exam:     Constitutional:  Vitals:    03/17/23 1129   BP: 118/80   Site: Right Upper Arm   Position: Sitting   Cuff Size: Large Adult   Weight: 218 lb (98.9 kg)   Height: 5' 5\" (1.651 m)       Chaperone for Intimate Exam  Chaperone was offered and accepted as part of the rooming process. Chaperone: Sandra          General Appearance: This  is a well Developed, well Nourished, well groomed female. Her BMI was reviewed. Nutritional decision making was discussed. Skin:  There was a Normal Inspection of the skin without rashes or lesions. There were no rashes. (Papular, Maculopapular, Hives, Pustular, Macular)     There were no lesions (Ulcers, Erythema, Abn. Appearing Nevi)            Lymphatic:  No Lymph Nodes were Palpable in the neck , axilla or groin.  0 # Of Lymph Nodes; Location ; Character [Normal]  [Shotty] [Tender] [Enlarged]     Neck and EENT:  The neck was supple.  There were no masses The thyroid was not enlarged and had no masses. Perrla, EOMI B/L, TMI B/L No Abnormalities. Throat inspected-No exudates or Masses, Nares Patent No Masses        Respiratory: The lungs were auscultated and found to be clear. There were no rales, rhonchi or wheezes. There was a good respiratory effort. Cardiovascular: The heart was in a regular rate and rhythm. . No S3 or S4. There was no murmur appreciated. Location, grade, and radiation are not applicable. Extremities: The patients extremities were without calf tenderness, edema, or varicosities. There was full range of motion in all four extremities. Pulses in all four extremities were appreciated and are 2/4. Abdomen: The abdomen was soft and non-tender. There were good bowel sounds in all quadrants and there was no guarding, rebound or rigidity. On evaluation there was no evidence of hepatosplenomegaly and there was no costal vertebral vanessa tenderness bilaterally. No hernias were appreciated. Abdominal Scars: C/W previous surgeries    Psych: The patient had a normal Orientation to: Time, Place, Person, and Situation  There is no Mood / Affect changes    Breast:  (Chest)  normal appearance, no masses or tenderness, No nipple retraction or dimpling, No nipple discharge or bleeding, No axillary or supraclavicular adenopathy, Normal to palpation without dominant masses  Self breast exams were reviewed in detail. Literature was given. Pelvic Exam:  External genitalia: hair loss and fat pad loss  Urinary system: urethral meatus normal  Vaginal: atrophic mucosa  Cervix: absent  Adnexa: non palpable  Uterus: absent    Rectal Exam:  exam declined by patient          Musculosk:  Normal Gait and station was noted. Digits were evaluated without abnormal findings. Range of motion, stability and strength were evaluated and found to be appropriate for the patients age. ASSESSMENT:      67 y.o. Annual   Diagnosis Orders   1.  Well woman exam        2. Encounter for screening mammogram for malignant neoplasm of breast  FIORELLA DIGITAL SCREEN W OR WO CAD BILATERAL      3. Post-menopausal  DEXA BONE DENSITY AXIAL SKELETON             Chief Complaint   Patient presents with    Annual Exam          Past Medical History:   Diagnosis Date    Anesthesia     woke up crying x1    Anesthesia complication     PATIENT WAKES UP CRYING AFTER ANESTHESIA    Cataract     BILATERAL EYES    CKD (chronic kidney disease) stage 3, GFR 30-59 ml/min ([facility])     Constipation     Fibromyalgia     Frequent stools     GERD (gastroesophageal reflux disease)     Hiatal hernia     High calcium levels     hx. 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 Tobacco & Secondary smoke risks reviewed; instructed on cessation and avoidance      Counseling Completed:  Preventative Health Recommendations and Follow up. The patient was informed of the recommended preventative health recommendations. 1. Annuals every year; Cytology collections per prevailing guidelines.    2. Mammograms begin every year at 37 yo if no abnormalities are found and no family history. 3. Bone density studies every 2-3 years. Begin at 71 yo. If no fracture history or osteoporosis family history. (significant). 4. Colonoscopy begin at 40 yo. Repeat every ten years if negative and no family history. 5. Calcium of 7536-7644 mg/day in split dosing  6. Vitamin D 400-800 IU/day  7. All other preventative health recommendations will be managed by the patients Primary care physician. PLAN:  Return in about 1 year (around 3/17/2024) for annual.  Mammogram and dexascan ordered. Repeat Annual every 1 year  Cervical Cytology Evaluation begins at 24years old. If Negative Cytology, Follow-up screening per current guidelines. Mammograms every 1 year. If 35 yo and last mammogram was negative. Calcium and Vitamin D dosing reviewed. Colonoscopy screening reviewed as well as onset for bone density testing. Birth control and barrier recommendations discussed. STD counseling and prevention reviewed. Gardisil counseling completed for all patients 10-35 yo. Routine health maintenance per patients PCP. Orders Placed This Encounter   Procedures    FIORELLA DIGITAL SCREEN W OR WO CAD BILATERAL     Standing Status:   Future     Standing Expiration Date:   5/15/2024     Order Specific Question:   Reason for exam:     Answer:   screening mammogram    DEXA BONE DENSITY AXIAL SKELETON     Standing Status:   Future     Standing Expiration Date:   3/15/2024     Order Specific Question:   Reason for exam:     Answer:   post menopausal           The patient, Rosanne Kingston is a 67 y.o. female, was seen with a total time spent of 20 minutes for the visit on this date of service by the E/M provider. The time component had both face to face and non face to face time spent in determining the total time component. Counseling and education regarding her diagnosis listed below and her options regarding those diagnoses were also included in determining her time component. Diagnosis Orders   1.  Well woman exam        2. Encounter for screening mammogram for malignant neoplasm of breast  FIORELLA DIGITAL SCREEN W OR WO CAD BILATERAL      3. Post-menopausal  DEXA BONE DENSITY AXIAL SKELETON           The patient had her preventative health maintenance recommendations and follow-up reviewed with her at the completion of her visit.

## 2023-05-22 ENCOUNTER — HOSPITAL ENCOUNTER (OUTPATIENT)
Dept: WOMENS IMAGING | Age: 73
Discharge: HOME | End: 2023-05-24
Payer: MEDICARE

## 2023-05-22 DIAGNOSIS — Z12.31 ENCOUNTER FOR SCREENING MAMMOGRAM FOR MALIGNANT NEOPLASM OF BREAST: ICD-10-CM

## 2023-05-22 DIAGNOSIS — Z78.0 POST-MENOPAUSAL: ICD-10-CM

## 2023-05-22 PROCEDURE — 77080 DXA BONE DENSITY AXIAL: CPT

## 2023-05-22 PROCEDURE — 77063 BREAST TOMOSYNTHESIS BI: CPT

## 2023-05-23 ENCOUNTER — TELEPHONE (OUTPATIENT)
Dept: OBGYN CLINIC | Age: 73
End: 2023-05-23

## 2023-05-23 PROBLEM — M85.80 OSTEOPENIA: Status: ACTIVE | Noted: 2023-05-23

## 2023-05-23 NOTE — TELEPHONE ENCOUNTER
Patient notified of results and recommendations. Patient given recommended dosage for calcium and vitamin d.

## 2023-05-23 NOTE — TELEPHONE ENCOUNTER
----- Message from TOI Duarte CNP sent at 5/23/2023 12:43 PM EDT -----  Osteopenia  Vitamin d3 800 IU daily  Calcium 1200-1500mg Po daily

## 2023-07-05 ENCOUNTER — HOSPITAL ENCOUNTER (OUTPATIENT)
Age: 73
Setting detail: SPECIMEN
Discharge: HOME | End: 2023-07-05

## 2023-07-05 DIAGNOSIS — N18.32 STAGE 3B CHRONIC KIDNEY DISEASE (HCC): ICD-10-CM

## 2023-07-05 DIAGNOSIS — R80.9 MICROALBUMINURIA: ICD-10-CM

## 2023-07-05 LAB
25(OH)D3 SERPL-MCNC: 39.8 NG/ML
ANION GAP SERPL CALCULATED.3IONS-SCNC: 14 MMOL/L (ref 9–17)
BASOPHILS # BLD: 0.09 K/UL (ref 0–0.2)
BASOPHILS NFR BLD: 1 % (ref 0–2)
BILIRUB UR QL STRIP: NEGATIVE
BUN SERPL-MCNC: 24 MG/DL (ref 8–23)
CALCIUM SERPL-MCNC: 10 MG/DL (ref 8.6–10.4)
CASTS #/AREA URNS LPF: ABNORMAL /LPF (ref 0–8)
CHLORIDE SERPL-SCNC: 106 MMOL/L (ref 98–107)
CLARITY UR: CLEAR
CO2 SERPL-SCNC: 22 MMOL/L (ref 20–31)
COLOR UR: YELLOW
CREAT SERPL-MCNC: 1.11 MG/DL (ref 0.5–0.9)
CREAT UR-MCNC: 104.3 MG/DL (ref 28–217)
EOSINOPHIL # BLD: 0.29 K/UL (ref 0–0.44)
EOSINOPHILS RELATIVE PERCENT: 4 % (ref 1–4)
EPI CELLS #/AREA URNS HPF: ABNORMAL /HPF (ref 0–5)
ERYTHROCYTE [DISTWIDTH] IN BLOOD BY AUTOMATED COUNT: 13.1 % (ref 11.8–14.4)
GFR SERPL CREATININE-BSD FRML MDRD: 52 ML/MIN/1.73M2
GLUCOSE UR STRIP-MCNC: NEGATIVE MG/DL
HCT VFR BLD AUTO: 43.6 % (ref 36.3–47.1)
HGB BLD-MCNC: 13.9 G/DL (ref 11.9–15.1)
HGB UR QL STRIP.AUTO: NEGATIVE
IMM GRANULOCYTES # BLD AUTO: <0.03 K/UL (ref 0–0.3)
IMM GRANULOCYTES NFR BLD: 0 %
KETONES UR STRIP-MCNC: NEGATIVE MG/DL
LEUKOCYTE ESTERASE UR QL STRIP: ABNORMAL
LYMPHOCYTES # BLD: 30 % (ref 24–43)
LYMPHOCYTES NFR BLD: 2.18 K/UL (ref 1.1–3.7)
MAGNESIUM SERPL-MCNC: 2.1 MG/DL (ref 1.6–2.6)
MCH RBC QN AUTO: 30.6 PG (ref 25.2–33.5)
MCHC RBC AUTO-ENTMCNC: 31.9 G/DL (ref 28.4–34.8)
MCV RBC AUTO: 96 FL (ref 82.6–102.9)
MICROALBUMIN UR-MCNC: 157 MG/L
MICROALBUMIN/CREAT UR-RTO: 151 MCG/MG CREAT
MONOCYTES NFR BLD: 0.56 K/UL (ref 0.1–1.2)
MONOCYTES NFR BLD: 8 % (ref 3–12)
NEUTROPHILS NFR BLD: 57 % (ref 36–65)
NEUTS SEG NFR BLD: 4.14 K/UL (ref 1.5–8.1)
NITRITE UR QL STRIP: NEGATIVE
NRBC BLD-RTO: 0 PER 100 WBC
PH UR STRIP: 5.5 [PH] (ref 5–8)
PHOSPHATE SERPL-MCNC: 3.8 MG/DL (ref 2.6–4.5)
PLATELET # BLD AUTO: 185 K/UL (ref 138–453)
PMV BLD AUTO: 10.9 FL (ref 8.1–13.5)
POTASSIUM SERPL-SCNC: 4.9 MMOL/L (ref 3.7–5.3)
PROT UR STRIP-MCNC: ABNORMAL MG/DL
RBC # BLD AUTO: 4.54 M/UL (ref 3.95–5.11)
RBC #/AREA URNS HPF: ABNORMAL /HPF (ref 0–4)
SODIUM SERPL-SCNC: 142 MMOL/L (ref 135–144)
SP GR UR STRIP: 1.01 (ref 1–1.03)
UROBILINOGEN UR STRIP-ACNC: NORMAL
WBC #/AREA URNS HPF: ABNORMAL /HPF (ref 0–5)
WBC OTHER # BLD: 7.3 K/UL (ref 3.5–11.3)

## 2023-09-13 ENCOUNTER — OFFICE VISIT (OUTPATIENT)
Dept: GASTROENTEROLOGY | Age: 73
End: 2023-09-13
Payer: MEDICARE

## 2023-09-13 VITALS
HEIGHT: 65 IN | SYSTOLIC BLOOD PRESSURE: 138 MMHG | HEART RATE: 65 BPM | DIASTOLIC BLOOD PRESSURE: 87 MMHG | BODY MASS INDEX: 35.32 KG/M2 | WEIGHT: 212 LBS

## 2023-09-13 DIAGNOSIS — K63.89 COLONIC MASS: ICD-10-CM

## 2023-09-13 DIAGNOSIS — K21.9 GASTROESOPHAGEAL REFLUX DISEASE, UNSPECIFIED WHETHER ESOPHAGITIS PRESENT: Primary | ICD-10-CM

## 2023-09-13 DIAGNOSIS — Z86.010 HX OF COLONIC POLYP: ICD-10-CM

## 2023-09-13 PROCEDURE — 99204 OFFICE O/P NEW MOD 45 MIN: CPT | Performed by: INTERNAL MEDICINE

## 2023-09-13 PROCEDURE — 1123F ACP DISCUSS/DSCN MKR DOCD: CPT | Performed by: INTERNAL MEDICINE

## 2023-09-13 ASSESSMENT — ENCOUNTER SYMPTOMS
ABDOMINAL PAIN: 1
SINUS PRESSURE: 1
DIARRHEA: 1

## 2023-09-14 RX ORDER — POLYETHYLENE GLYCOL 3350, SODIUM SULFATE ANHYDROUS, SODIUM BICARBONATE, SODIUM CHLORIDE, POTASSIUM CHLORIDE 236; 22.74; 6.74; 5.86; 2.97 G/4L; G/4L; G/4L; G/4L; G/4L
POWDER, FOR SOLUTION ORAL
Qty: 4000 ML | Refills: 0 | Status: SHIPPED | OUTPATIENT
Start: 2023-09-14

## 2023-09-18 ENCOUNTER — HOSPITAL ENCOUNTER (OUTPATIENT)
Dept: PREADMISSION TESTING | Age: 73
Discharge: HOME | End: 2023-09-22
Payer: MEDICARE

## 2023-09-18 VITALS — HEIGHT: 65 IN | BODY MASS INDEX: 35.32 KG/M2 | WEIGHT: 212 LBS

## 2023-09-18 NOTE — PROGRESS NOTES
Pre-op Instructions For Out-Patient Endoscopy Surgery    Medication Instructions:  Please stop herbs and any supplements now (includes vitamins and minerals). Please contact your surgeon and prescribing physician for pre-op instructions for any blood thinners. If you have inhalers/aerosol treatments at home, please use them the morning of your surgery and bring the inhalers with you to the hospital.    Please take the following medications the morning of your surgery with a sip of water:    none      Surgery Instructions:  After midnight before surgery:  Do not eat or drink anything, including water, mints, gum, and hard candy. You may brush your teeth without swallowing. No smoking, chewing tobacco, or street drugs. Please shower or bathe before surgery. Please do not wear any cologne, lotion, powder, jewelry, piercings, perfume, makeup, nail polish, hair accessories, or hair spray on the day of surgery. Wear loose comfortable clothing. Leave your valuables at home but bring a payment source for any after-surgery prescriptions you plan to fill at [facility]. Bring a storage case for any glasses/contacts. An adult who is responsible for you MUST drive you home and should be with you for the first 24 hours after surgery. The Day of Surgery:  Arrive at [facility] Entrance at the time directed by your surgeon and check in at the desk. If you have a living will or healthcare power of , please bring a copy. You will be taken to the pre-op holding area where you will be prepared for surgery. A physical assessment will be performed by a nurse practitioner or house officer. Your IV will be started and you will meet your anesthesiologist.    When you go to surgery, your family will be directed to the surgical waiting room, where the doctor should speak with them after your surgery. After surgery, you will be taken to the recovery area.   When

## 2023-09-18 NOTE — PRE-PROCEDURE INSTRUCTIONS
Have you received your Prep? Any questions with prep instructions? Y  Only Clear Liquid Diet day before. Y  Nothing to eat after midnight day before procedure. Y  Are you taking any blood thinners? If so, you need to Stop. N  Remove any jewelry and body piercings. Y  Do you wear glasses? If so, please bring a case to store them in. Are you having any Covid symptoms? N  Do you have any new rashes, infections, etc. that we should be aware of?N  Do you have a ride home the day of surgery? It cannot be a cab or medical transportation. Y  Verify surgery time/date and what time to arrive at hospital.[id number]

## 2023-09-19 ENCOUNTER — ANESTHESIA EVENT (OUTPATIENT)
Dept: ENDOSCOPY | Age: 73
End: 2023-09-19
Payer: MEDICARE

## 2023-09-20 ENCOUNTER — HOSPITAL ENCOUNTER (OUTPATIENT)
Age: 73
Setting detail: OUTPATIENT SURGERY
Discharge: HOME | End: 2023-09-20
Attending: INTERNAL MEDICINE | Admitting: INTERNAL MEDICINE
Payer: MEDICARE

## 2023-09-20 ENCOUNTER — ANESTHESIA (OUTPATIENT)
Dept: ENDOSCOPY | Age: 73
End: 2023-09-20
Payer: MEDICARE

## 2023-09-20 VITALS
WEIGHT: 208 LBS | RESPIRATION RATE: 25 BRPM | DIASTOLIC BLOOD PRESSURE: 79 MMHG | HEART RATE: 66 BPM | TEMPERATURE: 97.4 F | SYSTOLIC BLOOD PRESSURE: 150 MMHG | BODY MASS INDEX: 34.66 KG/M2 | HEIGHT: 65 IN | OXYGEN SATURATION: 100 %

## 2023-09-20 DIAGNOSIS — K21.9 GASTROESOPHAGEAL REFLUX DISEASE WITHOUT ESOPHAGITIS: ICD-10-CM

## 2023-09-20 DIAGNOSIS — Z86.010 HISTORY OF COLON POLYPS: ICD-10-CM

## 2023-09-20 PROCEDURE — 6360000002 HC RX W HCPCS: Performed by: NURSE ANESTHETIST, CERTIFIED REGISTERED

## 2023-09-20 PROCEDURE — 7100000010 HC PHASE II RECOVERY - FIRST 15 MIN: Performed by: INTERNAL MEDICINE

## 2023-09-20 PROCEDURE — 2580000003 HC RX 258: Performed by: ANESTHESIOLOGY

## 2023-09-20 PROCEDURE — 3700000000 HC ANESTHESIA ATTENDED CARE: Performed by: INTERNAL MEDICINE

## 2023-09-20 PROCEDURE — 3609010600 HC COLONOSCOPY POLYPECTOMY SNARE/COLD BIOPSY: Performed by: INTERNAL MEDICINE

## 2023-09-20 PROCEDURE — 7100000011 HC PHASE II RECOVERY - ADDTL 15 MIN: Performed by: INTERNAL MEDICINE

## 2023-09-20 PROCEDURE — 88305 TISSUE EXAM BY PATHOLOGIST: CPT

## 2023-09-20 PROCEDURE — 3609012400 HC EGD TRANSORAL BIOPSY SINGLE/MULTIPLE: Performed by: INTERNAL MEDICINE

## 2023-09-20 PROCEDURE — 2709999900 HC NON-CHARGEABLE SUPPLY: Performed by: INTERNAL MEDICINE

## 2023-09-20 PROCEDURE — 3700000001 HC ADD 15 MINUTES (ANESTHESIA): Performed by: INTERNAL MEDICINE

## 2023-09-20 PROCEDURE — 2500000003 HC RX 250 WO HCPCS: Performed by: NURSE ANESTHETIST, CERTIFIED REGISTERED

## 2023-09-20 RX ORDER — LIDOCAINE HYDROCHLORIDE 20 MG/ML
INJECTION, SOLUTION EPIDURAL; INFILTRATION; INTRACAUDAL; PERINEURAL PRN
Status: DISCONTINUED | OUTPATIENT
Start: 2023-09-20 | End: 2023-09-20 | Stop reason: SDUPTHER

## 2023-09-20 RX ORDER — SODIUM CHLORIDE 0.9 % (FLUSH) 0.9 %
5-40 SYRINGE (ML) INJECTION PRN
Status: DISCONTINUED | OUTPATIENT
Start: 2023-09-20 | End: 2023-09-20 | Stop reason: HOSPADM

## 2023-09-20 RX ORDER — SODIUM CHLORIDE, SODIUM LACTATE, POTASSIUM CHLORIDE, CALCIUM CHLORIDE 600; 310; 30; 20 MG/100ML; MG/100ML; MG/100ML; MG/100ML
INJECTION, SOLUTION INTRAVENOUS CONTINUOUS
Status: DISCONTINUED | OUTPATIENT
Start: 2023-09-20 | End: 2023-09-20 | Stop reason: HOSPADM

## 2023-09-20 RX ORDER — SODIUM CHLORIDE 0.9 % (FLUSH) 0.9 %
5-40 SYRINGE (ML) INJECTION EVERY 12 HOURS SCHEDULED
Status: DISCONTINUED | OUTPATIENT
Start: 2023-09-20 | End: 2023-09-20 | Stop reason: HOSPADM

## 2023-09-20 RX ORDER — SIMETHICONE 20 MG/.3ML
EMULSION ORAL PRN
Status: DISCONTINUED | OUTPATIENT
Start: 2023-09-20 | End: 2023-09-20 | Stop reason: ALTCHOICE

## 2023-09-20 RX ORDER — PROPOFOL 10 MG/ML
INJECTION, EMULSION INTRAVENOUS CONTINUOUS PRN
Status: DISCONTINUED | OUTPATIENT
Start: 2023-09-20 | End: 2023-09-20 | Stop reason: SDUPTHER

## 2023-09-20 RX ORDER — LIDOCAINE HYDROCHLORIDE 10 MG/ML
1 INJECTION, SOLUTION EPIDURAL; INFILTRATION; INTRACAUDAL; PERINEURAL
Status: DISCONTINUED | OUTPATIENT
Start: 2023-09-20 | End: 2023-09-20 | Stop reason: HOSPADM

## 2023-09-20 RX ORDER — SODIUM CHLORIDE 9 MG/ML
INJECTION, SOLUTION INTRAVENOUS PRN
Status: DISCONTINUED | OUTPATIENT
Start: 2023-09-20 | End: 2023-09-20 | Stop reason: HOSPADM

## 2023-09-20 RX ADMIN — PROPOFOL 200 MCG/KG/MIN: 10 INJECTION, EMULSION INTRAVENOUS at 10:02

## 2023-09-20 RX ADMIN — LIDOCAINE HYDROCHLORIDE 80 MG: 20 INJECTION, SOLUTION EPIDURAL; INFILTRATION; INTRACAUDAL; PERINEURAL at 10:03

## 2023-09-20 RX ADMIN — SODIUM CHLORIDE, POTASSIUM CHLORIDE, SODIUM LACTATE AND CALCIUM CHLORIDE: 600; 310; 30; 20 INJECTION, SOLUTION INTRAVENOUS at 08:38

## 2023-09-20 ASSESSMENT — ENCOUNTER SYMPTOMS
NAUSEA: 1
BLOOD IN STOOL: 0
VOMITING: 0
CONSTIPATION: 1
ABDOMINAL PAIN: 0
ANAL BLEEDING: 0
DIARRHEA: 1

## 2023-09-20 ASSESSMENT — PAIN - FUNCTIONAL ASSESSMENT: PAIN_FUNCTIONAL_ASSESSMENT: 0-10

## 2023-09-20 NOTE — H&P
HISTORY and [id number]       NAME:  Demian Benz  MRN: [id number]   YOB: 1950   Date: 9/20/2023   Age: 68 y.o. Gender: female       COMPLAINT AND PRESENT HISTORY:     Demian Benz is 68 y.o.,  female, presents for EGD BIOPSY, COLONOSCOPY DIAGNOSTIC     Primary dx: History of colon polyps [Z86.010]  Gastroesophageal reflux disease without esophagitis [K21.9]. HPI:Tita Coronel is 68 y.o.,  female, will be having a Colonoscopy and EGD. Prior Colonoscopy done 2022  with polypectomy and EGD was done long time ago   Patient has hx of Colon Polyps and Diverticulosis. Patient has positive FH of Colon Cancer in maternal Grandmother  Patient reports changes in bowel habits. Pt has intermittent between constipation and diarrhea, No GI /Rectal bleeding, experiencing red/ black/ BRBPR stools. Patient has no  history of abd. Pain. Sometimes she feel nausea but vomiting,   No abdominal bloating, no weight loss. Patient denies any Dysphagia. Pt has hx of GERD    Pt is on a PPI that is helping to control symptoms. Pt denies fever/chills, chest pain or SOB    Review of additional significant medical hx:  (See chart for additional detail, including current medications /see ROS for current S/S):     NPO status: pt NPO since the past midnight   Medications taken TODAY (with sip of water): none  Anticoagulation status: none  Prep fully completed: YES. Pt reports her last BM is liquid     Denies personal hx of blood clots. Denies personal hx of MRSA infection. Denies any personal or family hx of previous complications w/anesthesia. Pt has PONV.    PAST MEDICAL HISTORY     Past Medical History:   Diagnosis Date    Anesthesia     woke up crying x1    Anesthesia complication     PATIENT WAKES UP CRYING AFTER ANESTHESIA    Cataract     BILATERAL EYES    CKD (chronic kidney disease) stage 3, GFR 30-59 ml/min ([facility])     Constipation     Fibromyalgia

## 2023-09-20 NOTE — OP NOTE
PROCEDURE NOTE    DATE OF PROCEDURE: 9/20/2023    SURGEON: Shady Guzman MD  Facility : [facility]  ASSISTANT: None  Anesthesia: MAC  PREOPERATIVE DIAGNOSIS:   Questionable mass on the ileocecal valve as seen by a different physician      POSTOPERATIVE DIAGNOSIS: as described below    OPERATION: Total colonoscopy     ANESTHESIA: Moderate Sedation    ESTIMATED BLOOD LOSS: less than 50     COMPLICATIONS: None. SPECIMENS:  Was Obtained:     3 polyps in the right colon removed with cold snare the largest of which was 9 mm    Diverticulosis    Hemorrhoids    HISTORY: The patient is a 68y.o. year old female with history of above preop diagnosis. I recommended colonoscopy with possible biopsy or polypectomy and I explained the risk, benefits, expected outcome, and alternatives to the procedure. Risks included but are not limited to bleeding, infection, respiratory distress, hypotension, and perforation of the colon and possibility of missing a lesion. The patient understands and is in agreement. The patient was counseled at length about the risks of michelet Covid-19 during their perioperative period and any recovery window from their procedure. The patient was made aware that michelet Covid-19  may worsen their prognosis for recovering from their procedure  and lend to a higher morbidity and/or mortality risk. All material risks, benefits, and reasonable alternatives including postponing the procedure were discussed. The patient does wish to proceed with the procedure at this time. PROCEDURE: The patient was given IV conscious sedation. The patient's SPO2 remained above 90% throughout the procedure. The colonoscope was inserted per rectum and advanced under direct vision to the cecum without difficulty. Post sedation note : The patient's SPO2 remained above 90% throughout the procedure. the vital signs remained stable , and no immediate complication form the procedure noted,

## 2023-09-20 NOTE — OP NOTE
PROCEDURE NOTE    DATE OF PROCEDURE: 9/20/2023     SURGEON: Rick Shah MD  Facility: [facility]  ASSISTANT: None  Anesthesia: MAC  PREOPERATIVE DIAGNOSIS:   GERD    Diagnosis:  Schatzki's ring    Significant gastritis with edema and erythema in the entire stomach biopsies were taken        POSTOPERATIVE DIAGNOSIS: As described below    OPERATION: Upper GI endoscopy with Biopsy    ANESTHESIA: Moderate Sedation     ESTIMATED BLOOD LOSS: Less than 50 ml    COMPLICATIONS: None. SPECIMENS:  Was Obtained:     As above     HISTORY: The patient is a 68y.o. year old female with history of above preop diagnosis. I recommended esophagogastroduodenoscopy with possible biopsy and I explained the risk, benefits, expected outcome, and alternatives to the procedure. Risks included but are not limited to bleeding, infection, respiratory distress, hypotension, and perforation of the esophagus, stomach, or duodenum. Patient understands and is in agreement. The patient was counseled at length about the risks of michelet Covid-19 during their perioperative period and any recovery window from their procedure. The patient was made aware that michelet Covid-19  may worsen their prognosis for recovering from their procedure  and lend to a higher morbidity and/or mortality risk. All material risks, benefits, and reasonable alternatives including postponing the procedure were discussed. The patient does wish to proceed with the procedure at this time. PROCEDURE: The patient was given IV conscious sedation. The patient's SPO2 remained above 90% throughout the procedure. The gastroscope was inserted orally and advanced under direct vision through the esophagus, through the stomach, through the pylorus, and into the descending duodenum. Post sedation note : The patient's SPO2 remained above 90% throughout the procedure. the vital signs remained stable , and no immediate complication form the

## 2023-09-20 NOTE — DISCHARGE INSTRUCTIONS
Sedation or General Anesthesia, Adult  Care After  Refer to this sheet in the next 24 hours. These instructions provide you with information on caring for yourself after your procedure. Your caregiver may also give you more specific instructions. Your treatment has been planned according to current medical practices, but problems sometimes occur. Call your caregiver if you have any problems or questions after your procedure. HOME CARE INSTRUCTIONS   Do not participate in any activities that require you to be alert or coordinated. Do not:  Drive. Swim. Ride a bicycle. Operate heavy machinery. Luna Dad. Use power tools. Climb ladders. Work at [employer]. Take a bath. Do not drink alcohol. Do not make any important decisions or sign legal documents. Stay with an adult. The first meal following your procedure should be light and small. Avoid solid foods if you feel sick to your stomach (nauseous) or if you throw up (vomit). Drink enough fluids to keep your urine clear or pale yellow. Only take your usual medicines or new medicines if your caregiver approves them. Only take over-the-counter or prescription medicines for pain, discomfort, or fever as directed by your caregiver. Keep all follow-up appointments as directed by your caregiver. SEEK IMMEDIATE MEDICAL CARE IF:   You are not feeling normal or behaving normally after 24 hours. You have persistent nausea and vomiting. You are unable to drink fluids or eat food. You have difficulty urinating. You have difficulty breathing or speaking. You have blue or gray skin. There is difficulty waking or you cannot be woken up. You have heavy bleeding, redness, or a lot of swelling where the sedative or anesthesia entered your skin (intravenous site). You have a rash. MAKE SURE YOU:  Understand these instructions. Will watch your condition. Will get help right away if you are not doing well or get worse.   Document Released: 12/18/2006 Document Revised:

## 2023-09-20 NOTE — ANESTHESIA PRE PROCEDURE
Department of Anesthesiology  Preprocedure Note       Name:  Tamiko Benson   Age:  68 y.o.  :  1950                                          MRN:  [id number]         Date:  2023      Surgeon: Pa Sanchez):  Jaden Yang MD    Procedure: Procedure(s):  EGD BIOPSY  COLONOSCOPY DIAGNOSTIC    Medications prior to admission:   Prior to Admission medications    Medication Sig Start Date End Date Taking? Authorizing Provider   vitamin D 25 MCG (1000 UT) CAPS Take by mouth    Historical Provider, MD   calcium carbonate (OSCAL) 500 MG TABS tablet Take 1,200 mg by mouth daily    Historical Provider, MD   tiZANidine (ZANAFLEX) 4 MG tablet TAKE 1 TABLET BY MOUTH THREE TIMES DAILY AS NEEDED MUSCLE PAIN 23   Lulla Prader, MD   losartan (COZAAR) 25 MG tablet TAKE 1 TABLET BY MOUTH IN THE MORNING 23   Solitario Jones MD   omeprazole (PRILOSEC) 20 MG delayed release capsule Take 1 capsule by mouth twice daily 23   Lulla Prader, MD   atorvastatin (LIPITOR) 10 MG tablet Take 1 tablet by mouth once daily 23   Lulla Prader, MD   Handicap Placard MISC by Does not apply route It is my medical opinin that Joaquim Cheng requires a disability parking placard for the following reasons:   She has limited walking ability due to an arthritic condition.    Duration of need: permanent 23   Lulla Prader, MD   Multiple Vitamins-Minerals (THERAPEUTIC MULTIVITAMIN-MINERALS) tablet Take 1 tablet by mouth daily Women's multi vitamin daily    Historical Provider, MD   acetaminophen (TYLENOL) 500 MG tablet Take 2 tablets by mouth 2 times daily as needed    Historical Provider, MD   DiphenhydrAMINE HCl (BENADRYL ALLERGY PO) Take 25 mg by mouth every 6 hours as needed Takes 1 -2  At a time    Historical Provider, MD   Handicap Placard MISC It is my medical opinin that Joaquim Cheng requires a disability parking placard for the following reasons:  She has limited walking ability due to an arthritic

## 2023-09-22 LAB — SURGICAL PATHOLOGY REPORT: NORMAL

## 2023-10-25 DIAGNOSIS — K63.89 COLONIC MASS: ICD-10-CM

## 2023-10-25 DIAGNOSIS — Z86.010 HX OF COLONIC POLYP: ICD-10-CM

## 2024-01-04 ENCOUNTER — HOSPITAL ENCOUNTER (OUTPATIENT)
Age: 74
Setting detail: SPECIMEN
Discharge: HOME | End: 2024-01-04

## 2024-01-04 DIAGNOSIS — N18.30 BENIGN HYPERTENSION WITH CKD (CHRONIC KIDNEY DISEASE) STAGE III (HCC): ICD-10-CM

## 2024-01-04 DIAGNOSIS — R73.9 HYPERGLYCEMIA: ICD-10-CM

## 2024-01-04 DIAGNOSIS — I12.9 BENIGN HYPERTENSION WITH CKD (CHRONIC KIDNEY DISEASE) STAGE III (HCC): ICD-10-CM

## 2024-01-04 DIAGNOSIS — E78.00 PURE HYPERCHOLESTEROLEMIA: ICD-10-CM

## 2024-01-05 LAB
ALBUMIN SERPL-MCNC: 4.4 G/DL (ref 3.5–5.2)
ALBUMIN/GLOB SERPL: 1.5 {RATIO} (ref 1–2.5)
ALP SERPL-CCNC: 47 U/L (ref 35–104)
ALT SERPL-CCNC: 14 U/L (ref 5–33)
ANION GAP SERPL CALCULATED.3IONS-SCNC: 8 MMOL/L (ref 9–17)
AST SERPL-CCNC: 18 U/L
BILIRUB SERPL-MCNC: 0.6 MG/DL (ref 0.3–1.2)
BUN SERPL-MCNC: 19 MG/DL (ref 8–23)
CALCIUM SERPL-MCNC: 10 MG/DL (ref 8.6–10.4)
CHLORIDE SERPL-SCNC: 105 MMOL/L (ref 98–107)
CHOLEST SERPL-MCNC: 168 MG/DL
CHOLESTEROL/HDL RATIO: 2.8
CO2 SERPL-SCNC: 26 MMOL/L (ref 20–31)
CREAT SERPL-MCNC: 1 MG/DL (ref 0.5–0.9)
EST. AVERAGE GLUCOSE BLD GHB EST-MCNC: 117 MG/DL
GFR SERPL CREATININE-BSD FRML MDRD: 59 ML/MIN/1.73M2
GLUCOSE SERPL-MCNC: 100 MG/DL (ref 70–99)
HBA1C MFR BLD: 5.7 % (ref 4–6)
HDLC SERPL-MCNC: 61 MG/DL
LDLC SERPL CALC-MCNC: 91 MG/DL (ref 0–130)
POTASSIUM SERPL-SCNC: 5.1 MMOL/L (ref 3.7–5.3)
PROT SERPL-MCNC: 7.4 G/DL (ref 6.4–8.3)
SODIUM SERPL-SCNC: 139 MMOL/L (ref 135–144)
TRIGL SERPL-MCNC: 78 MG/DL

## 2024-03-27 ENCOUNTER — OFFICE VISIT (OUTPATIENT)
Dept: OBGYN CLINIC | Age: 74
End: 2024-03-27
Payer: MEDICARE

## 2024-03-27 VITALS
HEIGHT: 65 IN | WEIGHT: 212 LBS | DIASTOLIC BLOOD PRESSURE: 86 MMHG | BODY MASS INDEX: 35.32 KG/M2 | SYSTOLIC BLOOD PRESSURE: 128 MMHG

## 2024-03-27 DIAGNOSIS — Z12.31 ENCOUNTER FOR SCREENING MAMMOGRAM FOR MALIGNANT NEOPLASM OF BREAST: ICD-10-CM

## 2024-03-27 DIAGNOSIS — Z01.419 WELL WOMAN EXAM WITH ROUTINE GYNECOLOGICAL EXAM: Primary | ICD-10-CM

## 2024-03-27 PROCEDURE — 99397 PER PM REEVAL EST PAT 65+ YR: CPT | Performed by: NURSE PRACTITIONER

## 2024-03-27 NOTE — PROGRESS NOTES
years (40.0 ttl pk-yrs)     Types: Cigarettes     Start date: 1970     Quit date: 1990     Years since quittin.6    Smokeless tobacco: Never   Vaping Use    Vaping Use: Never used   Substance and Sexual Activity    Alcohol use: No    Drug use: No    Sexual activity: Yes     Partners: Male   Other Topics Concern    Not on file   Social History Narrative    Not on file     Social Determinants of Health     Financial Resource Strain: High Risk (2024)    Overall Financial Resource Strain (CARDIA)     Difficulty of Paying Living Expenses: Very hard   Food Insecurity: No Food Insecurity (2024)    Hunger Vital Sign     Worried About Running Out of Food in the Last Year: Never true     Ran Out of Food in the Last Year: Never true   Transportation Needs: Unknown (2024)    PRAPARE - Transportation     Lack of Transportation (Medical): Not on file     Lack of Transportation (Non-Medical): No   Physical Activity: Insufficiently Active (2024)    Exercise Vital Sign     Days of Exercise per Week: 3 days     Minutes of Exercise per Session: 30 min   Stress: Not on file   Social Connections: Not on file   Intimate Partner Violence: Not on file   Housing Stability: Unknown (2024)    Housing Stability Vital Sign     Unable to Pay for Housing in the Last Year: Not on file     Number of Places Lived in the Last Year: Not on file     Unstable Housing in the Last Year: No       MEDICATIONS:  Current Outpatient Medications   Medication Sig Dispense Refill    atorvastatin (LIPITOR) 10 MG tablet Take 1 tablet by mouth daily 90 tablet 3    losartan (COZAAR) 25 MG tablet Take 1 tablet by mouth daily 90 tablet 3    omeprazole (PRILOSEC) 20 MG delayed release capsule Take 1 capsule by mouth 2 times daily 180 capsule 3    tiZANidine (ZANAFLEX) 4 MG tablet TAKE 1 TABLET BY MOUTH THREE TIMES DAILY AS NEEDED FOR  MUSCLE  PAIN 30 tablet 3    vitamin D 25 MCG (1000 UT) CAPS Take by mouth      calcium carbonate (OSCAL)

## 2024-04-30 NOTE — PROGRESS NOTES
Father     Diabetes Father         RESOLVED WITH WEIGHT LOSS    High Blood Pressure Father     Kidney Disease Father     Cancer Maternal Grandmother         colon, uterine, breast    Diabetes Maternal Grandmother      Social History     Occupational History    Not on file   Tobacco Use    Smoking status: Former Smoker     Packs/day: 2.00     Years: 20.00     Pack years: 40.00     Types: Cigarettes     Last attempt to quit: 1990     Years since quittin.4    Smokeless tobacco: Never Used   Substance and Sexual Activity    Alcohol use: No    Drug use: No    Sexual activity: Not on file        Review of Systems   Constitutional: Negative for chills and fever. HENT: Negative for congestion and rhinorrhea. Eyes: Negative. Respiratory: Negative for cough. Cardiovascular: Negative for chest pain and leg swelling. Gastrointestinal: Negative for nausea and vomiting. Musculoskeletal: Positive for arthralgias (Bilateral knee pain). Skin: Negative for color change and rash. Neurological: Negative for dizziness and numbness. Physical Exam  No new physical examination is performed today  Assessment:     Encounter Diagnosis   Name Primary?  Bilateral primary osteoarthritis of knee Yes       No new x-rays are taken in clinic today    Procedure: An informed verbal consent for the procedure was obtained and risks including, but not limited to: allergy to medications, injection, bleeding, stiffness of joint, recurrence of symptoms, loss of function, swelling, drainage, irrigation, need for surgery and pseudo-septic inflammation, were explained to the patient. Also, discussed was the potential for further injections, irrigation and debridement and surgery. Alternate means of treatment have also been discussed with the patient.     Administrations This Visit     hyaluronate (MONOVISC) injection 88 mg     Admin Date  2020  14:57 Action  Given Dose  88 mg Route  Intra-articular Site  Knee Left Administered By  Jose Angel Argueta RN    Ordering Provider:  ANDRE Parry    [address]:  [id number]    Lot#:  [id number]    :  [address]    Patient Supplied?:  No           Admin Date  01/08/2020  14:58 Action  Given Dose  88 mg Route  Intra-articular Site  Knee Right Administered By  Jose Angel Argueta RN    Ordering Provider:  ANDRE Parry    NDC:  41597-768-50    Lot#:  [id number]    :  [address]    Patient Supplied?:  No          lidocaine PF 1 % injection 4 mL     Admin Date  01/08/2020  14:58 Action  Given Dose  4 mL Route  Intradermal Site  Knee Left Administered By  Jose Angel Argueta RN    Ordering Provider:  ANDRE Parry    NDC:  8418-0977-98    Lot#:  [id number]    :  Anup Melendez    Patient Supplied?:  No           Admin Date  01/08/2020  14:59 Action  Given Dose  4 mL Route  Intradermal Site  Knee Right Administered By  Jose Angel Argueta RN    Ordering Provider:  ANDRE Parry    NDC:  2849-0213-24    Lot#:  [id number]    :  Anup Melendez    Patient Supplied?:  No            .  Under sterile conditions the inferolateral portals bilateral knees are prepped with Betadine and alcohol. Using a 25-gauge needle 4 cc of lidocaine is injected superficially. Subsequently using a 21-gauge needle the amount of a solution is injected into bilateral knee joint. A bandage is applied to the injection site. Patient tolerated procedure well. Plan:     Underwent bilateral Monovisc injections today    Patient is instructed to keep activity tonight to a minimum after being injected with viscosupplementation today. Instructed to ice and elevated area tonight and return to normal activity as tolerated tomorrow. Patient is educated on appropriate length of time to allow for visco to start to take affect.       Follow-up PRN    Orders Placed This Encounter   Procedures    FL ARTHROCENTESIS ASPIR&/INJ MAJOR JT/BURSA W/O US    FL ARTHROCENTESIS ASPIR&/INJ MAJOR BALDEMAR/NAMRATA W/O         Jolan Severin, [address]      Please note that this chart was generated using voicerecognition Dragon dictation software. Although every effort was made to ensurethe accuracy of this automated transcription, some errors in transcription may haveoccurred. Detail Level: Detailed Quality 130: Documentation Of Current Medications In The Medical Record: Current Medications Documented none

## 2024-06-13 ENCOUNTER — HOSPITAL ENCOUNTER (OUTPATIENT)
Dept: WOMENS IMAGING | Age: 74
Discharge: HOME | End: 2024-06-15
Payer: MEDICARE

## 2024-06-13 DIAGNOSIS — Z12.31 ENCOUNTER FOR SCREENING MAMMOGRAM FOR MALIGNANT NEOPLASM OF BREAST: ICD-10-CM

## 2024-06-13 PROCEDURE — 77063 BREAST TOMOSYNTHESIS BI: CPT

## 2024-07-10 ENCOUNTER — HOSPITAL ENCOUNTER (OUTPATIENT)
Age: 74
Setting detail: SPECIMEN
Discharge: HOME | End: 2024-07-10

## 2024-07-10 DIAGNOSIS — R80.9 MICROALBUMINURIA: ICD-10-CM

## 2024-07-10 DIAGNOSIS — N18.31 STAGE 3A CHRONIC KIDNEY DISEASE (HCC): ICD-10-CM

## 2024-07-10 DIAGNOSIS — Z13.21 ENCOUNTER FOR VITAMIN DEFICIENCY SCREENING: ICD-10-CM

## 2024-07-10 DIAGNOSIS — N18.30 BENIGN HYPERTENSION WITH CKD (CHRONIC KIDNEY DISEASE) STAGE III (HCC): ICD-10-CM

## 2024-07-10 DIAGNOSIS — I12.9 BENIGN HYPERTENSION WITH CKD (CHRONIC KIDNEY DISEASE) STAGE III (HCC): ICD-10-CM

## 2024-07-10 DIAGNOSIS — R80.8 OTHER PROTEINURIA: ICD-10-CM

## 2024-07-10 LAB
25(OH)D3 SERPL-MCNC: 31.1 NG/ML (ref 30–100)
ANION GAP SERPL CALCULATED.3IONS-SCNC: 12 MMOL/L (ref 9–16)
BACTERIA URNS QL MICRO: ABNORMAL
BILIRUB UR QL STRIP: NEGATIVE
BUN SERPL-MCNC: 19 MG/DL (ref 8–23)
CALCIUM SERPL-MCNC: 9.2 MG/DL (ref 8.6–10.4)
CASTS #/AREA URNS LPF: ABNORMAL /LPF (ref 0–8)
CHLORIDE SERPL-SCNC: 104 MMOL/L (ref 98–107)
CLARITY UR: CLEAR
CO2 SERPL-SCNC: 25 MMOL/L (ref 20–31)
COLOR UR: YELLOW
CREAT SERPL-MCNC: 1.2 MG/DL (ref 0.5–0.9)
CREAT UR-MCNC: 120 MG/DL (ref 28–217)
EPI CELLS #/AREA URNS HPF: ABNORMAL /HPF (ref 0–5)
GFR, ESTIMATED: 48 ML/MIN/1.73M2
GLUCOSE UR STRIP-MCNC: NEGATIVE MG/DL
HCT VFR BLD AUTO: 42.5 % (ref 36.3–47.1)
HGB BLD-MCNC: 13.6 G/DL (ref 11.9–15.1)
HGB UR QL STRIP.AUTO: NEGATIVE
KETONES UR STRIP-MCNC: NEGATIVE MG/DL
LEUKOCYTE ESTERASE UR QL STRIP: NEGATIVE
MAGNESIUM SERPL-MCNC: 1.9 MG/DL (ref 1.6–2.4)
MICROALBUMIN UR-MCNC: 382 MG/L (ref 0–20)
MICROALBUMIN/CREAT UR-RTO: 318 MCG/MG CREAT (ref 0–25)
NITRITE UR QL STRIP: NEGATIVE
PH UR STRIP: 6 [PH] (ref 5–8)
PHOSPHATE SERPL-MCNC: 3.5 MG/DL (ref 2.5–4.5)
POTASSIUM SERPL-SCNC: 4.3 MMOL/L (ref 3.7–5.3)
PROT UR STRIP-MCNC: ABNORMAL MG/DL
RBC #/AREA URNS HPF: ABNORMAL /HPF (ref 0–4)
SODIUM SERPL-SCNC: 141 MMOL/L (ref 136–145)
SP GR UR STRIP: 1.01 (ref 1–1.03)
UROBILINOGEN UR STRIP-ACNC: NORMAL EU/DL (ref 0–1)
WBC #/AREA URNS HPF: ABNORMAL /HPF (ref 0–5)

## 2024-10-24 ENCOUNTER — TELEPHONE (OUTPATIENT)
Dept: OBGYN CLINIC | Age: 74
End: 2024-10-24

## 2024-10-24 RX ORDER — METRONIDAZOLE 500 MG/1
500 TABLET ORAL 2 TIMES DAILY
Qty: 14 TABLET | Refills: 0 | Status: SHIPPED | OUTPATIENT
Start: 2024-10-24 | End: 2024-10-31

## 2024-10-30 ENCOUNTER — HOSPITAL ENCOUNTER (OUTPATIENT)
Age: 74
Setting detail: SPECIMEN
Discharge: HOME | End: 2024-10-30

## 2024-10-30 ENCOUNTER — OFFICE VISIT (OUTPATIENT)
Dept: OBGYN CLINIC | Age: 74
End: 2024-10-30
Payer: MEDICARE

## 2024-10-30 VITALS
HEIGHT: 65 IN | DIASTOLIC BLOOD PRESSURE: 74 MMHG | SYSTOLIC BLOOD PRESSURE: 110 MMHG | WEIGHT: 224 LBS | BODY MASS INDEX: 37.32 KG/M2

## 2024-10-30 DIAGNOSIS — R30.0 DYSURIA: ICD-10-CM

## 2024-10-30 DIAGNOSIS — N89.8 VAGINAL IRRITATION: Primary | ICD-10-CM

## 2024-10-30 DIAGNOSIS — N76.0 ACUTE VAGINITIS: ICD-10-CM

## 2024-10-30 DIAGNOSIS — N89.8 VAGINAL IRRITATION: ICD-10-CM

## 2024-10-30 PROCEDURE — 1159F MED LIST DOCD IN RCRD: CPT | Performed by: NURSE PRACTITIONER

## 2024-10-30 PROCEDURE — 99213 OFFICE O/P EST LOW 20 MIN: CPT | Performed by: NURSE PRACTITIONER

## 2024-10-30 PROCEDURE — 1123F ACP DISCUSS/DSCN MKR DOCD: CPT | Performed by: NURSE PRACTITIONER

## 2024-10-30 RX ORDER — CLOTRIMAZOLE AND BETAMETHASONE DIPROPIONATE 10; .64 MG/G; MG/G
CREAM TOPICAL
Qty: 45 G | Refills: 1 | Status: SHIPPED | OUTPATIENT
Start: 2024-10-30

## 2024-10-30 ASSESSMENT — PATIENT HEALTH QUESTIONNAIRE - PHQ9
SUM OF ALL RESPONSES TO PHQ QUESTIONS 1-9: 0
SUM OF ALL RESPONSES TO PHQ9 QUESTIONS 1 & 2: 0
SUM OF ALL RESPONSES TO PHQ QUESTIONS 1-9: 0
SUM OF ALL RESPONSES TO PHQ QUESTIONS 1-9: 0
2. FEELING DOWN, DEPRESSED OR HOPELESS: NOT AT ALL
1. LITTLE INTEREST OR PLEASURE IN DOING THINGS: NOT AT ALL
SUM OF ALL RESPONSES TO PHQ QUESTIONS 1-9: 0

## 2024-10-30 NOTE — PROGRESS NOTES
Tita Gee  10/30/2024    YOB: 1950          The patient was seen today. She is here regarding possible reaction to flagyl, vaginal irritation, red rash, fever. Called in to our office last week because she thought she had a BV infection. Was having vaginal irritation and burning after urination. Sent prescription for Flagyl. Took 2 doses of flagyl on Friday. Started having a fever, body aches Saturday, . Took Covid test Saturday and it was negative.Taking tylenol for fever. Called on call doc this weekend. Physician reports they did not think it was an allergy to Flagyl. Complaining of urine looking dark. Urine started to look dark Saturday.  Reports drinking 3-4 bottles water a day. Complaining of dysuria. Feels like she has to go but not much comes out. Complaining of red rash to both legs, arm and back. Rash does not itch. Continued flagyl over the weekend. Has 2 days left.  Patient  but not sexually active. Hx of hysterectomy.  Temp 97.6 in office. Her bowels are regular and she is voiding without difficulty.     HPI:  Tita Gee is a 74 y.o. female      Possible reaction to flagyl  Vaginal irritation  Red rash, fever  Dark urine, dysuria      OB History    Para Term  AB Living   1 1 1 0 0 1   SAB IAB Ectopic Molar Multiple Live Births   0 0 0 0 0 1      # Outcome Date GA Lbr Chang/2nd Weight Sex Type Anes PTL Lv   1 Term 1970    F Vag-Spont   PANDA       Past Medical History:   Diagnosis Date    Anesthesia     woke up crying x1    Anesthesia complication     PATIENT WAKES UP CRYING AFTER ANESTHESIA    Cataract     BILATERAL EYES    CKD (chronic kidney disease) stage 3, GFR 30-59 ml/min ([facility])     Constipation     Fibromyalgia     Frequent stools     GERD (gastroesophageal reflux disease)     Hiatal hernia     High calcium levels     hx. of , had\" lower left thyroid removed\"    History of blood transfusion     AUTOLOGUS X1 AFTER HYSTERECTOMY

## 2024-10-31 LAB
BACTERIA URNS QL MICRO: NORMAL
BILIRUB UR QL STRIP: NEGATIVE
CASTS #/AREA URNS LPF: NORMAL /LPF (ref 0–8)
CLARITY UR: CLEAR
COLOR UR: YELLOW
EPI CELLS #/AREA URNS HPF: NORMAL /HPF (ref 0–5)
GLUCOSE UR STRIP-MCNC: NEGATIVE MG/DL
HGB UR QL STRIP.AUTO: NEGATIVE
KETONES UR STRIP-MCNC: NEGATIVE MG/DL
LEUKOCYTE ESTERASE UR QL STRIP: ABNORMAL
NITRITE UR QL STRIP: NEGATIVE
PH UR STRIP: 6 [PH] (ref 5–8)
PROT UR STRIP-MCNC: ABNORMAL MG/DL
RBC #/AREA URNS HPF: NORMAL /HPF (ref 0–4)
SP GR UR STRIP: 1.01 (ref 1–1.03)
UROBILINOGEN UR STRIP-ACNC: NORMAL EU/DL (ref 0–1)
WBC #/AREA URNS HPF: NORMAL /HPF (ref 0–5)

## 2024-11-02 LAB
MICROORGANISM SPEC CULT: NORMAL
SERVICE CMNT-IMP: NORMAL
SPECIMEN DESCRIPTION: NORMAL

## 2025-01-06 ENCOUNTER — HOSPITAL ENCOUNTER (OUTPATIENT)
Age: 75
Setting detail: SPECIMEN
Discharge: HOME | End: 2025-01-06

## 2025-01-06 DIAGNOSIS — N18.30 BENIGN HYPERTENSION WITH CKD (CHRONIC KIDNEY DISEASE) STAGE III (HCC): ICD-10-CM

## 2025-01-06 DIAGNOSIS — R73.9 HYPERGLYCEMIA: ICD-10-CM

## 2025-01-06 DIAGNOSIS — E78.00 PURE HYPERCHOLESTEROLEMIA: ICD-10-CM

## 2025-01-06 DIAGNOSIS — I12.9 BENIGN HYPERTENSION WITH CKD (CHRONIC KIDNEY DISEASE) STAGE III (HCC): ICD-10-CM

## 2025-01-06 LAB
ALBUMIN SERPL-MCNC: 4.3 G/DL (ref 3.5–5.2)
ALBUMIN/GLOB SERPL: 1.6 {RATIO} (ref 1–2.5)
ALP SERPL-CCNC: 53 U/L (ref 35–104)
ALT SERPL-CCNC: 14 U/L (ref 10–35)
ANION GAP SERPL CALCULATED.3IONS-SCNC: 11 MMOL/L (ref 9–16)
AST SERPL-CCNC: 19 U/L (ref 10–35)
BILIRUB SERPL-MCNC: 0.5 MG/DL (ref 0–1.2)
BUN SERPL-MCNC: 19 MG/DL (ref 8–23)
CALCIUM SERPL-MCNC: 9.4 MG/DL (ref 8.6–10.4)
CHLORIDE SERPL-SCNC: 108 MMOL/L (ref 98–107)
CHOLEST SERPL-MCNC: 160 MG/DL (ref 0–199)
CHOLESTEROL/HDL RATIO: 2.6
CO2 SERPL-SCNC: 24 MMOL/L (ref 20–31)
CREAT SERPL-MCNC: 1.1 MG/DL (ref 0.6–0.9)
ERYTHROCYTE [DISTWIDTH] IN BLOOD BY AUTOMATED COUNT: 13.1 % (ref 11.8–14.4)
EST. AVERAGE GLUCOSE BLD GHB EST-MCNC: 128 MG/DL
GFR, ESTIMATED: 53 ML/MIN/1.73M2
GLUCOSE SERPL-MCNC: 112 MG/DL (ref 74–99)
HBA1C MFR BLD: 6.1 % (ref 4–6)
HCT VFR BLD AUTO: 40.2 % (ref 36.3–47.1)
HDLC SERPL-MCNC: 62 MG/DL
HGB BLD-MCNC: 12.7 G/DL (ref 11.9–15.1)
LDLC SERPL CALC-MCNC: 86 MG/DL (ref 0–100)
MCH RBC QN AUTO: 28.9 PG (ref 25.2–33.5)
MCHC RBC AUTO-ENTMCNC: 31.6 G/DL (ref 28.4–34.8)
MCV RBC AUTO: 91.6 FL (ref 82.6–102.9)
NRBC BLD-RTO: 0 PER 100 WBC
PLATELET # BLD AUTO: 176 K/UL (ref 138–453)
PMV BLD AUTO: 10.8 FL (ref 8.1–13.5)
POTASSIUM SERPL-SCNC: 4.3 MMOL/L (ref 3.7–5.3)
PROT SERPL-MCNC: 7 G/DL (ref 6.6–8.7)
RBC # BLD AUTO: 4.39 M/UL (ref 3.95–5.11)
SODIUM SERPL-SCNC: 143 MMOL/L (ref 136–145)
TRIGL SERPL-MCNC: 62 MG/DL (ref 0–149)
VLDLC SERPL CALC-MCNC: 12 MG/DL (ref 1–30)
WBC OTHER # BLD: 7 K/UL (ref 3.5–11.3)

## 2025-01-28 ENCOUNTER — HOSPITAL ENCOUNTER (EMERGENCY)
Age: 75
Discharge: HOME | End: 2025-01-28
Attending: EMERGENCY MEDICINE
Payer: MEDICARE

## 2025-01-28 VITALS
OXYGEN SATURATION: 97 % | WEIGHT: 217 LBS | SYSTOLIC BLOOD PRESSURE: 196 MMHG | RESPIRATION RATE: 16 BRPM | HEIGHT: 65 IN | HEART RATE: 85 BPM | BODY MASS INDEX: 36.15 KG/M2 | DIASTOLIC BLOOD PRESSURE: 90 MMHG | TEMPERATURE: 98.1 F

## 2025-01-28 DIAGNOSIS — I10 HYPERTENSION, UNSPECIFIED TYPE: Primary | ICD-10-CM

## 2025-01-28 LAB
ALBUMIN SERPL-MCNC: 4.5 G/DL (ref 3.5–5.2)
ALP SERPL-CCNC: 61 U/L (ref 35–104)
ALT SERPL-CCNC: 18 U/L (ref 10–35)
ANION GAP SERPL CALCULATED.3IONS-SCNC: 12 MMOL/L (ref 9–16)
AST SERPL-CCNC: 24 U/L (ref 10–35)
BACTERIA URNS QL MICRO: ABNORMAL
BASOPHILS # BLD: 0.1 K/UL (ref 0–0.2)
BASOPHILS NFR BLD: 2 % (ref 0–2)
BILIRUB SERPL-MCNC: 0.7 MG/DL (ref 0–1.2)
BILIRUB UR QL STRIP: NEGATIVE
BUN SERPL-MCNC: 16 MG/DL (ref 8–23)
CALCIUM SERPL-MCNC: 9.7 MG/DL (ref 8.6–10.4)
CASTS #/AREA URNS LPF: ABNORMAL /LPF
CHLORIDE SERPL-SCNC: 106 MMOL/L (ref 98–107)
CLARITY UR: CLEAR
CO2 SERPL-SCNC: 25 MMOL/L (ref 20–31)
COLOR UR: YELLOW
CREAT SERPL-MCNC: 1.1 MG/DL (ref 0.7–1.2)
EOSINOPHIL # BLD: 0.2 K/UL (ref 0–0.4)
EOSINOPHILS RELATIVE PERCENT: 4 % (ref 0–4)
EPI CELLS #/AREA URNS HPF: ABNORMAL /HPF
ERYTHROCYTE [DISTWIDTH] IN BLOOD BY AUTOMATED COUNT: 14.2 % (ref 11.5–14.9)
GFR, ESTIMATED: 53 ML/MIN/1.73M2
GLUCOSE SERPL-MCNC: 130 MG/DL (ref 74–99)
GLUCOSE UR STRIP-MCNC: NEGATIVE MG/DL
HCT VFR BLD AUTO: 43.4 % (ref 36–46)
HGB BLD-MCNC: 14.4 G/DL (ref 12–16)
HGB UR QL STRIP.AUTO: NEGATIVE
INR PPP: 1
KETONES UR STRIP-MCNC: NEGATIVE MG/DL
LEUKOCYTE ESTERASE UR QL STRIP: NEGATIVE
LYMPHOCYTES NFR BLD: 1.8 K/UL (ref 1–4.8)
LYMPHOCYTES RELATIVE PERCENT: 30 % (ref 24–44)
MAGNESIUM SERPL-MCNC: 1.9 MG/DL (ref 1.6–2.4)
MCH RBC QN AUTO: 29.5 PG (ref 26–34)
MCHC RBC AUTO-ENTMCNC: 33.2 G/DL (ref 31–37)
MCV RBC AUTO: 88.6 FL (ref 80–100)
MONOCYTES NFR BLD: 0.3 K/UL (ref 0.1–1.3)
MONOCYTES NFR BLD: 5 % (ref 1–7)
NEUTROPHILS NFR BLD: 59 % (ref 36–66)
NEUTS SEG NFR BLD: 3.7 K/UL (ref 1.3–9.1)
NITRITE UR QL STRIP: NEGATIVE
PH UR STRIP: 5.5 [PH] (ref 5–8)
PLATELET # BLD AUTO: 176 K/UL (ref 150–450)
PMV BLD AUTO: 8 FL (ref 6–12)
POTASSIUM SERPL-SCNC: 4.3 MMOL/L (ref 3.7–5.3)
PROT SERPL-MCNC: 7.8 G/DL (ref 6.6–8.7)
PROT UR STRIP-MCNC: ABNORMAL MG/DL
PROTHROMBIN TIME: 13.5 SEC (ref 11.8–14.6)
RBC # BLD AUTO: 4.89 M/UL (ref 4–5.2)
RBC #/AREA URNS HPF: ABNORMAL /HPF
SODIUM SERPL-SCNC: 143 MMOL/L (ref 136–145)
SP GR UR STRIP: 1.01 (ref 1–1.03)
TROPONIN I SERPL HS-MCNC: 10 NG/L (ref 0–14)
TROPONIN I SERPL HS-MCNC: 11 NG/L (ref 0–14)
UROBILINOGEN UR STRIP-ACNC: NORMAL EU/DL (ref 0–1)
WBC #/AREA URNS HPF: ABNORMAL /HPF
WBC OTHER # BLD: 6.2 K/UL (ref 3.5–11)

## 2025-01-28 PROCEDURE — 85610 PROTHROMBIN TIME: CPT

## 2025-01-28 PROCEDURE — 84484 ASSAY OF TROPONIN QUANT: CPT

## 2025-01-28 PROCEDURE — 99284 EMERGENCY DEPT VISIT MOD MDM: CPT

## 2025-01-28 PROCEDURE — 83735 ASSAY OF MAGNESIUM: CPT

## 2025-01-28 PROCEDURE — 85025 COMPLETE CBC W/AUTO DIFF WBC: CPT

## 2025-01-28 PROCEDURE — 93005 ELECTROCARDIOGRAM TRACING: CPT | Performed by: EMERGENCY MEDICINE

## 2025-01-28 PROCEDURE — 81001 URINALYSIS AUTO W/SCOPE: CPT

## 2025-01-28 PROCEDURE — 80053 COMPREHEN METABOLIC PANEL: CPT

## 2025-01-28 PROCEDURE — 36415 COLL VENOUS BLD VENIPUNCTURE: CPT

## 2025-01-28 RX ORDER — LOSARTAN POTASSIUM 100 MG/1
100 TABLET ORAL DAILY
Qty: 30 TABLET | Refills: 0 | Status: SHIPPED | OUTPATIENT
Start: 2025-01-28

## 2025-01-28 ASSESSMENT — LIFESTYLE VARIABLES
HOW MANY STANDARD DRINKS CONTAINING ALCOHOL DO YOU HAVE ON A TYPICAL DAY: PATIENT DOES NOT DRINK
HOW OFTEN DO YOU HAVE A DRINK CONTAINING ALCOHOL: NEVER

## 2025-01-28 NOTE — ED PROVIDER NOTES
EMERGENCY DEPARTMENT ENCOUNTER    Pt Name: Tita Gee  MRN: [id number]  Birthdate 1950  Date of evaluation: 1/28/25  CHIEF COMPLAINT       Chief Complaint   Patient presents with    Hypertension     HISTORY OF PRESENT ILLNESS   74-year-old female presents for complaints of hypertension.  States that she was at her PCPs office yesterday and for hypertension.  Was reportedly increased on her blood pressure meds.  Patient states that she has been checking her blood pressure at home and is still been elevated.  Was reportedly told to come to the ED for evaluation today.  She denies any chest pain, shortness of breath, dizziness, lightheadedness, vision changes, new numbness tingling or weakness to the extremities.    The history is provided by the patient.           REVIEW OF SYSTEMS     Review of Systems   Constitutional:  Negative for fever.   HENT:  Negative for congestion and ear pain.    Eyes:  Negative for pain.   Respiratory:  Negative for shortness of breath.    Cardiovascular:  Negative for chest pain, palpitations and leg swelling.   Gastrointestinal:  Negative for abdominal pain.   Genitourinary:  Negative for dysuria and flank pain.   Musculoskeletal:  Negative for back pain.   Skin:  Negative for color change.   Neurological:  Negative for numbness and headaches.   Psychiatric/Behavioral:  Negative for confusion.    All other systems reviewed and are negative.    PASTMEDICAL HISTORY     Past Medical History:   Diagnosis Date    Anesthesia     woke up crying x1    Anesthesia complication     PATIENT WAKES UP CRYING AFTER ANESTHESIA    Cataract     BILATERAL EYES    CKD (chronic kidney disease) stage 3, GFR 30-59 ml/min ([facility])     Constipation     Fibromyalgia     Frequent stools     GERD (gastroesophageal reflux disease)     Hiatal hernia     High calcium levels     hx. of , had\" lower left thyroid removed\"    History of blood transfusion 1990    AUTOLOGUS X1 AFTER HYSTERECTOMY    Hypercholesterolemia   Diagnosis: Hypertension    Diagnoses Considered but Do Not Suspect: Hypertensive emergency    Pertinent Comorbid Conditions: Hypertension    2)  Data Reviewed and Analyzed  (Lab and radiology tests/orders below in next section)      My EKG interpretation: Normal sinus rhythm rate of 70, left axis, no ST segment elevation or depression nonspecific T wave changes      3)  Treatment and Disposition             Will check labs    Labs are reviewed and unremarkable, creatinine at baseline    Patient reevaluated remains asymptomatic    Discussed with Dr. Gimenez who is on call for  patient's PCP  would like patient's losartan to be increased to 100 and they will see her in the office later this week for blood pressure check    Results and plan discussed with patient she is agreeable, discussed need for follow-up with PCP and return precautions, patient voiced understanding comfortable plan and discharge    Patient/Guardian was informed of their diagnosis and told to follow up with PCP  in 1-3 days. Patient demonstrates understanding and agreement with the plan. They were given the opportunity to ask questions and those questions were answered to the best of our ability with the available information. Patient/Guardian told to return to the ED for any new, worsening, changing or persistent symptoms.       This dictation was prepared using Dragon Medical voice recognition software.             CRITICAL CARE:       PROCEDURES:    Procedures      DATA FOR LAB AND RADIOLOGY TESTS ORDERED BELOW ARE REVIEWED BY THE ED CLINICIAN:    RADIOLOGY: All x-rays, CT, MRI, and formal ultrasound images (except ED bedside ultrasound) are read by the radiologist, see reports below, unless otherwise noted in MDM or here.  Reports below are reviewed by myself.  No orders to display       LABS: Lab orders shown below, the results are reviewed by myself, and all abnormals are listed below.  Labs Reviewed   COMPREHENSIVE METABOLIC PANEL -

## 2025-01-28 NOTE — ED NOTES
Mode of arrival (squad #, walk in, police, etc) : Walk in        Chief complaint(s): Hypertension        Arrival Note (brief scenario, treatment PTA, etc).: Pt arrives to ED c/o high BP. Patient states that she saw her PCP last week for an unrelated issue where he BP was found to be elevated. Patient states that PCP increaded her losartan that she takes from her nephrologist because it \"is good for the kidneys.\" Patient reports BP continuing to increase despite medication adjustment. Patient reports lightheadedness.

## 2025-01-29 LAB
EKG ATRIAL RATE: 70 BPM
EKG P AXIS: 11 DEGREES
EKG P-R INTERVAL: 188 MS
EKG Q-T INTERVAL: 434 MS
EKG QRS DURATION: 108 MS
EKG QTC CALCULATION (BAZETT): 468 MS
EKG R AXIS: -36 DEGREES
EKG T AXIS: 71 DEGREES
EKG VENTRICULAR RATE: 70 BPM

## 2025-01-29 PROCEDURE — 93010 ELECTROCARDIOGRAM REPORT: CPT | Performed by: INTERNAL MEDICINE

## 2025-01-29 ASSESSMENT — ENCOUNTER SYMPTOMS
COLOR CHANGE: 0
ABDOMINAL PAIN: 0
SHORTNESS OF BREATH: 0
EYE PAIN: 0
BACK PAIN: 0

## 2025-03-27 DIAGNOSIS — Z12.31 ENCOUNTER FOR SCREENING MAMMOGRAM FOR MALIGNANT NEOPLASM OF BREAST: Primary | ICD-10-CM

## 2025-07-02 ENCOUNTER — HOSPITAL ENCOUNTER (OUTPATIENT)
Dept: WOMENS IMAGING | Age: 75
Discharge: HOME | End: 2025-07-04
Payer: MEDICARE

## 2025-07-02 ENCOUNTER — RESULTS FOLLOW-UP (OUTPATIENT)
Dept: OBGYN CLINIC | Age: 75
End: 2025-07-02

## 2025-07-02 DIAGNOSIS — Z12.31 ENCOUNTER FOR SCREENING MAMMOGRAM FOR MALIGNANT NEOPLASM OF BREAST: ICD-10-CM

## 2025-07-02 PROCEDURE — 77063 BREAST TOMOSYNTHESIS BI: CPT

## (undated) DEVICE — DEFENDO AIR WATER SUCTION AND BIOPSY VALVE KIT FOR  OLYMPUS: Brand: DEFENDO AIR/WATER/SUCTION AND BIOPSY VALVE

## (undated) DEVICE — BITEBLOCK 54FR W/ DENT RIM BLOX

## (undated) DEVICE — GLOVE ORANGE PI 8 1/2   MSG9085

## (undated) DEVICE — CONTAINER,SPECIMEN,4OZ,OR STRL: Brand: MEDLINE

## (undated) DEVICE — SUTURE VCRL SZ 3-0 L27IN ABSRB UD L26MM SH 1/2 CIR J416H

## (undated) DEVICE — SVMMC HD AND NK PK

## (undated) DEVICE — FORCEPS BX L L240CM DIA2.4MM RAD JAW 4 HOT FOR POLYP DISP

## (undated) DEVICE — MERCY HEALTH ST CHARLES: Brand: MEDLINE INDUSTRIES, INC.

## (undated) DEVICE — POLYP TRAP: Brand: TRAPEASE®

## (undated) DEVICE — DISPOSABLE BIPOLAR CABLE, 12FT (3.6M): Brand: KIRWAN

## (undated) DEVICE — GLOVE SURG SZ 8 CRM LTX FREE POLYISOPRENE POLYMER BEAD ANTI

## (undated) DEVICE — SNARE ENDOSCP L240CM LOOP W13MM DIA2.4MM SHT THROW SM OVL

## (undated) DEVICE — SPONGE,PEANUT,XRAY,ST,SM,3/8",5/CARD: Brand: MEDLINE INDUSTRIES, INC.

## (undated) DEVICE — POUCH INSTR W6.75XL11.5IN FRST 2 PKT ADH FOR ORTH AND

## (undated) DEVICE — BANDAGE,GAUZE,BULKEE II,4.5"X4.1YD,STRL: Brand: MEDLINE

## (undated) DEVICE — PENCIL ES L3M BTTN SWCH HOLSTER W/ BLDE ELECTRD EDGE

## (undated) DEVICE — GLOVE ORANGE PI 7 1/2   MSG9075

## (undated) DEVICE — ST CHARLES PERI-GYN PACK: Brand: MEDLINE INDUSTRIES, INC.

## (undated) DEVICE — GLOVE SURG SZ 8 L12IN FNGR THK75MIL WHT LTX POLYMER BEAD

## (undated) DEVICE — ENDO KIT W/SYRINGE: Brand: MEDLINE INDUSTRIES, INC.

## (undated) DEVICE — YANKAUER,BULB TIP,W/O VENT,RIGID,STERILE: Brand: MEDLINE

## (undated) DEVICE — ERBE NESSY® OMEGA PLATE USA (85+23)CM² , WITH CABLE 3 M: Brand: ERBE

## (undated) DEVICE — SOLUTION IV IRRIG POUR BRL 0.9% SODIUM CHL 2F7124

## (undated) DEVICE — CHLORAPREP 26ML ORANGE

## (undated) DEVICE — BLANKET WRM W29.9XL79.1IN UP BODY FORC AIR MISTRAL-AIR

## (undated) DEVICE — TOWEL,OR,DSP,ST,NATURAL,DLX,4/PK,20PK/CS: Brand: MEDLINE

## (undated) DEVICE — SUTURE MCRYL SZ 4-0 L18IN ABSRB UD L16MM PC-3 3/8 CIR PRIM Y845G

## (undated) DEVICE — SKIN AFFIX SURG ADHESIVE 72/CS 0.55ML: Brand: MEDLINE

## (undated) DEVICE — BLADE ES ELASTOMERIC COAT INSUL DURABLE BEND UPTO 90DEG

## (undated) DEVICE — SUTURE VCRL + SZ 2-0 L27IN ABSRB UD CT-2 L26MM 1/2 CIR TAPR VCP269H

## (undated) DEVICE — TUBING SUCT 12FR MAL ALUM SHFT FN CAP VENT UNIV CONN W/ OBT

## (undated) DEVICE — INTENDED FOR TISSUE SEPARATION, AND OTHER PROCEDURES THAT REQUIRE A SHARP SURGICAL BLADE TO PUNCTURE OR CUT.: Brand: BARD-PARKER ® CARBON RIB-BACK BLADES

## (undated) DEVICE — FORCEPS BX L240CM JAW DIA2.8MM L CAP W/ NDL MIC MESH TOOTH

## (undated) DEVICE — FORCEPS BX L240CM WRK CHN 2.8MM STD CAP W/ NDL MIC MESH

## (undated) DEVICE — DRESSING TRNSPAR W5XL4.5IN FLM SHT SEMIPERMEABLE WIND

## (undated) DEVICE — SYRINGE IRRIG 60ML SFT PLIABLE BLB EZ TO GRP 1 HND USE W/

## (undated) DEVICE — PROBE 8225101 5PK STD PRASS FL TIP ROHS

## (undated) DEVICE — Z DISCONTINUED APPLICATOR SURG PREP 0.35OZ 2% CHG 70% ISO ALC W/ HI LT

## (undated) DEVICE — SHEARS ENDOSCP L9CM CRV HARM FOCS +

## (undated) DEVICE — DRAPE,INSTRUMENT,MAGNETIC,10X16: Brand: MEDLINE

## (undated) DEVICE — TOTAL TRAY, DB, 100% SILI FOLEY, 16FR 10: Brand: MEDLINE

## (undated) DEVICE — SINGLE PORT MANIFOLD: Brand: NEPTUNE 2

## (undated) DEVICE — GOWN,SIRUS,NONRNF,SETINSLV,XL,20/CS: Brand: MEDLINE

## (undated) DEVICE — STRIP,CLOSURE,WOUND,MEDI-STRIP,1/2X4: Brand: MEDLINE

## (undated) DEVICE — EMG TUBE 8229707 NIM TRIVANTAGE 7.0MM ID: Brand: NIM TRIVANTAGE™

## (undated) DEVICE — ELECTRODE ELECSURG NDL 2.8 INX7.2 CM COAT INSUL EDGE